# Patient Record
Sex: MALE | Race: WHITE | NOT HISPANIC OR LATINO | Employment: OTHER | URBAN - METROPOLITAN AREA
[De-identification: names, ages, dates, MRNs, and addresses within clinical notes are randomized per-mention and may not be internally consistent; named-entity substitution may affect disease eponyms.]

---

## 2017-01-16 ENCOUNTER — ALLSCRIPTS OFFICE VISIT (OUTPATIENT)
Dept: OTHER | Facility: OTHER | Age: 67
End: 2017-01-16

## 2017-01-16 DIAGNOSIS — E55.9 VITAMIN D DEFICIENCY: ICD-10-CM

## 2017-01-16 DIAGNOSIS — Z86.2 PERSONAL HISTORY OF DISEASES OF THE BLOOD AND BLOOD-FORMING ORGANS AND CERTAIN DISORDERS INVOLVING THE IMMUNE MECHANISM: ICD-10-CM

## 2017-01-16 DIAGNOSIS — E78.1 PURE HYPERGLYCERIDEMIA: ICD-10-CM

## 2017-01-16 DIAGNOSIS — I10 ESSENTIAL (PRIMARY) HYPERTENSION: ICD-10-CM

## 2017-06-12 ENCOUNTER — LAB CONVERSION - ENCOUNTER (OUTPATIENT)
Dept: OTHER | Facility: OTHER | Age: 67
End: 2017-06-12

## 2017-06-12 LAB
25(OH)D3 SERPL-MCNC: 38 NG/ML (ref 30–100)
BUN SERPL-MCNC: 26 MG/DL (ref 7–25)
BUN/CREA RATIO (HISTORICAL): 27 (CALC) (ref 6–22)
CALCIUM SERPL-MCNC: 9.6 MG/DL (ref 8.6–10.3)
CHLORIDE SERPL-SCNC: 104 MMOL/L (ref 98–110)
CHOLEST SERPL-MCNC: 186 MG/DL (ref 125–200)
CHOLEST/HDLC SERPL: 4.7 (CALC)
CO2 SERPL-SCNC: 30 MMOL/L (ref 20–31)
CREAT SERPL-MCNC: 0.95 MG/DL (ref 0.7–1.25)
DEPRECATED RDW RBC AUTO: 12.8 % (ref 11–15)
EGFR AFRICAN AMERICAN (HISTORICAL): 94 ML/MIN/1.73M2
EGFR-AMERICAN CALC (HISTORICAL): 81 ML/MIN/1.73M2
GLUCOSE (HISTORICAL): 116 MG/DL (ref 65–99)
HCT VFR BLD AUTO: 50.8 % (ref 38.5–50)
HDLC SERPL-MCNC: 40 MG/DL
HGB BLD-MCNC: 17.1 G/DL (ref 13.2–17.1)
LDL CHOLESTEROL (HISTORICAL): 74 MG/DL (CALC)
MCH RBC QN AUTO: 32.7 PG (ref 27–33)
MCHC RBC AUTO-ENTMCNC: 33.6 G/DL (ref 32–36)
MCV RBC AUTO: 97.2 FL (ref 80–100)
NON-HDL-CHOL (CHOL-HDL) (HISTORICAL): 146 MG/DL (CALC)
PLATELET # BLD AUTO: 79 THOUSAND/UL (ref 140–400)
PMV BLD AUTO: 9.9 FL (ref 7.5–12.5)
POTASSIUM SERPL-SCNC: 3.6 MMOL/L (ref 3.5–5.3)
RBC # BLD AUTO: 5.23 MILLION/UL (ref 4.2–5.8)
SODIUM SERPL-SCNC: 141 MMOL/L (ref 135–146)
TRIGL SERPL-MCNC: 360 MG/DL
WBC # BLD AUTO: 5.1 THOUSAND/UL (ref 3.8–10.8)

## 2017-06-16 ENCOUNTER — ALLSCRIPTS OFFICE VISIT (OUTPATIENT)
Dept: OTHER | Facility: OTHER | Age: 67
End: 2017-06-16

## 2017-08-09 ENCOUNTER — GENERIC CONVERSION - ENCOUNTER (OUTPATIENT)
Dept: OTHER | Facility: OTHER | Age: 67
End: 2017-08-09

## 2017-11-08 ENCOUNTER — LAB CONVERSION - ENCOUNTER (OUTPATIENT)
Dept: OTHER | Facility: OTHER | Age: 67
End: 2017-11-08

## 2017-11-08 LAB
HBA1C MFR BLD HPLC: 4.8 % OF TOTAL HGB
PROSTATE SPECIFIC ANTIGEN TOTAL (HISTORICAL): 0.9 NG/ML

## 2017-11-17 ENCOUNTER — ALLSCRIPTS OFFICE VISIT (OUTPATIENT)
Dept: OTHER | Facility: OTHER | Age: 67
End: 2017-11-17

## 2017-11-21 NOTE — PROGRESS NOTES
Assessment    1  Benign essential hypertension (401 1) (I10)   2  Anxiety (300 00) (F41 9)   3  Unequal leg length (acquired) (736 81) (M21 70)   4  GERD without esophagitis (530 81) (K21 9)    Plan  Benign essential hypertension    · Atenolol 100 MG Oral Tablet   · Atenolol 50 MG Oral Tablet   · Atenolol 25 MG Oral Tablet; TAKE 4 TABLET Twice daily   · Valsartan-Hydrochlorothiazide 320-25 MG Oral Tablet; TAKE 1 TABLET DAILY  Depression    · ALPRAZolam 0 25 MG Oral Tablet; TAKE 1 TABLET 4 TIMES DAILY AS NEEDED  Depression, Insomnia    · LORazepam 1 MG Oral Tablet; TAKE 1 TABLET BY MOUTH 3 TIMES DAILY  GERD without esophagitis    · RaNITidine HCl - 300 MG Oral Tablet (Zantac); TAKE 1 TABLET DAILY ASDIRECTED   · Prostate Exam; Status:Complete;   Done: 40ITH2362   · Follow-up visit in 6 months Evaluation and Treatment  Follow-up  Status: Hold For -Scheduling  Requested for: 25MSF8107  Insomnia    · Escitalopram Oxalate 10 MG Oral Tablet; TAKE 1 TABLET DAILY    Discussion/Summary    Reviewed labs  Continue with foot lifts  The patient was counseled regarding instructions for management,-- risk factor reductions,-- importance of compliance with treatment  Educational resources provided: [employer]  Possible side effects of new medications were reviewed with the patient/guardian today  The treatment plan was reviewed with the patient/guardian  The patient/guardian understands and agrees with the treatment plan     Self Referrals: No      Chief Complaint  pt here today for 5 month check up and to review bw   Patient is here today for follow up of chronic conditions described in HPI  History of Present Illness  Follow up  No complaints  Has been on Xanax and Ativan combo for 40 years  Review of Systems   Constitutional: No fever or chills, feels well, no tiredness, no recent weight gain or weight loss  Eyes: No complaints of eye pain, no red eyes, no discharge from eyes, no itchy eyes    ENT: no complaints of earache, no hearing loss, no nosebleeds, no nasal discharge, no sore throat, no hoarseness  Cardiovascular: No complaints of slow heart rate, no fast heart rate, no chest pain, no palpitations, no leg claudication, no lower extremity  Respiratory: No complaints of shortness of breath, no wheezing, no cough, no SOB on exertion, no orthopnea or PND  Gastrointestinal: No complaints of abdominal pain, no constipation, no nausea or vomiting, no diarrhea or bloody stools  Genitourinary: No complaints of dysuria, no incontinence, no hesitancy, no nocturia, no genital lesion, no testicular pain  Musculoskeletal: No complaints of arthralgia, no myalgias, no joint swelling or stiffness, no limb pain or swelling  Integumentary: No complaints of skin rash or skin lesions, no itching, no skin wound, no dry skin  Neurological: No compliants of headache, no confusion, no convulsions, no numbness or tingling, no dizziness or fainting, no limb weakness, no difficulty walking  Psychiatric: Is not suicidal, no sleep disturbances, no anxiety or depression, no change in personality, no emotional problems  Endocrine: No complaints of proptosis, no hot flashes, no muscle weakness, no erectile dysfunction, no deepening of the voice, no feelings of weakness  Hematologic/Lymphatic: No complaints of swollen glands, no swollen glands in the neck, does not bleed easily, no easy bruising  Active Problems    1  Advance directive in chart (V49 89) (Z78 9)   2  Anxiety (300 00) (F41 9)   3  Benign essential hypertension (401 1) (I10)   4  Depression (311) (F32 9)   5  Depression screening (V79 0) (Z13 89)   6  Encounter for screening colonoscopy (V76 51) (Z12 11)   7  Essential hypertriglyceridemia (272 1) (E78 1)   8  GERD without esophagitis (530 81) (K21 9)   9  Glaucoma screening (V80 1) (Z13 5)   10  Hypercalcemia (275 42) (E83 52)   11  Hyperglycemia (790 29) (R73 9)   12  Hyperparathyroidism (252 00) (E21 3)   13   Ingrowing nail (703 0) (L60 0)   14  Insomnia (780 52) (G47 00)   15  Medicare annual wellness visit, initial (V70 0) (Z00 00)   16  Need for influenza vaccination (V04 81) (Z23)   17  Rosacea (695 3) (L71 9)   18  Screening for genitourinary condition (V81 6) (Z13 89)   19  Screening for neurological condition (V80 09) (Z13 89)   20  Screening PSA (prostate specific antigen) (V76 44) (Z12 5)   21  Tinea pedis (110 4) (B35 3)   22  Unequal leg length (acquired) (736 81) (M21 70)   23  Urinary incontinence (788 30) (R32)   24  Vitamin D deficiency (268 9) (E55 9)    Past Medical History  1  History of Cat-scratch disease (078 3) (A28 1)   2  History of Cellulitis (682 9) (L03 90)   3  History of erysipelas (V13 3) (Z87 2)   4  History of low back pain (V13 59) (Z87 39)   5  History of rosacea (V13 3) (Z87 2)   6  History of thrombocytopenia (V12 3) (Z86 2)   7  History of Joint pain, knee (719 46) (M25 569)   8  History of Parathyroid adenoma (227 1) (D35 1)   9  History of Tick Bites (919 4)    Surgical History  1  History of Appendectomy   2  History of Lymphadenectomy   3  History of Parathyroid Surgery    Family History  Mother    1  Family history of aortic aneurysm (V17 49) (Z82 49)   2  Family history of Hypertension (V17 49)  Father    3  Family history of Acute Myocardial Infarction (V17 3)  Paternal Grandmother    4  Family history of Breast Cancer (V16 3)  Paternal Aunt    11  Family history of Breast Cancer (V16 3)  Paternal Uncle    10  Family history of Diabetes Mellitus (V18 0)    Social History     · Advance directive in chart (V49 89) (Z78 9)   · Denied: History of Alcohol Use (History)   · Former smoker (V15 82) (V23 995)    Current Meds   1  ALPRAZolam 0 25 MG Oral Tablet; TAKE 1 TABLET 4 TIMES DAILY AS NEEDED; Therapy: 61BCM1253 to (Evaluate:26Wdk1361)  Requested for: 74OAO0112; Last Rx:16Jun2017 Ordered   2  Aspirin 81 MG TABS; TAKE 1 TABLET DAILY; Therapy: 22Jul2013 to Recorded   3   Atenolol 100 MG Oral Tablet; take 1 tablet by mouth twice a day; Therapy: 66END2397 to (Evaluate:54Sny9083)  Requested for: 26BRW3053; Last Rx:16Jun2017 Ordered   4  Atenolol 25 MG Oral Tablet; TAKE 4 TABLET Twice daily; Therapy: 93TBC4455 to (Evaluate:62Cjg3735)  Requested for: 75XHV3245; Last Rx:09Nov2017 Ordered   5  Atenolol 50 MG Oral Tablet; TAKE 4 TABLET Daily; Therapy: 10Yoi6367 to (Evaluate:62Eeh9079)  Requested for: 03MVM7309; Last Rx:09Nov2017 Ordered   6  Calcium Citrate Chewy Bite 500-500 MG-UNIT Oral Tablet Chewable; Therapy: 61UJK8683 to Recorded   7  Doxycycline Hyclate 150 MG Oral Tablet Delayed Release; Therapy: 66Age9299 to Recorded   8  Econazole Nitrate 1 % External Cream; APPLY THIN FILM ONCE DAILY AS DIRECTED; Therapy: 92Rvn4035 to Recorded   9  Escitalopram Oxalate 10 MG Oral Tablet; TAKE 1 TABLET DAILY; Therapy: 34TQQ9863 to (Evaluate:23Wbs1157)  Requested for: 72NPS4771; Last Rx:16Jun2017 Ordered   10  Hydrocortisone 2 5 % External Cream; APPLY SPARINGLY TO AFFECTED AREA(S) 2 TO  3 TIMES DAILY; Therapy: 53Rqw7682 to Recorded   11  Klor-Con 10 10 MEQ Oral Tablet Extended Release; TAKE 1 TABLET DAILY; Therapy: 04THR8243 to (Stormy Richardson)  Requested for: 20DUW6308; Last  Rx:19Ghe6320 Ordered   12  LORazepam 1 MG Oral Tablet; TAKE 1 TABLET BY MOUTH 3 TIMES DAILY; Therapy: 49LER3848 to (Evaluate:39Png7207)  Requested for: 21JKA0794; Last  Rx:16Jun2017 Ordered   13  Magnesium 250 MG Oral Tablet; Therapy: 95AFQ9728 to Recorded   14  Metrogel 1 % External Gel; APPLY  AND RUB  IN A THIN FILM TO AFFECTED AREAS 1-2  TIMES DAILY AS NEEDED  (AM AND PM); Therapy: 63Zay7121 to Recorded   15  Multivitamins TABS; 1 TAB DAILY; Therapy: 63VJA9287 to (Last GA:10EKP2214)  Requested for: 72STS7706 Ordered   16  Probiotic Oral Capsule; Therapy: 46Yjk7407 to Recorded   17  RaNITidine HCl - 300 MG Oral Tablet; TAKE 1 TABLET DAILY AS DIRECTED;   Therapy: 40CHP7326 to (Evaluate:58Jkx0441)  Requested for: 63CIL4587; Last Rx:46Tcb0065 Ordered   18  Valsartan-Hydrochlorothiazide 320-25 MG Oral Tablet; TAKE 1 TABLET DAILY; Therapy: 28EBY8615 to (Evaluate:81Bet0563)  Requested for: 55BTC9621; Last  Rx:37Ggp5032 Ordered   19  Vitamin C Oral Tablet Chewable; 1 TAB DAILY; Therapy: 30BTC1950 to (Last VU:61UFO3679)  Requested for: 90HKD5953 Ordered   20  Vitamin D3 1000 UNIT Oral Tablet; Therapy: 29EAD3280 to Recorded    The medication list was reviewed and updated today  Allergies  1  Morphine Derivatives  2  No Known Environmental Allergies   3  No Known Food Allergies    Vitals  Vital Signs    Recorded: 81BSQ5035 12:03PM   Temperature 98 7 F, Oral   Heart Rate 87   Respiration 16   Systolic 120, LUE   Diastolic 76, LUE   Height 5 ft 5 in   Weight 226 lb    BMI Calculated 37 61   BSA Calculated 2 08   O2 Saturation 97       Physical Exam   Constitutional  General appearance: No acute distress, well appearing and well nourished  Eyes  Conjunctiva and lids: No swelling, erythema, or discharge  Pupils and irises: Equal, round and reactive to light  Ears, Nose, Mouth, and Throat  External inspection of ears and nose: Normal    Otoscopic examination: Tympanic membrance translucent with normal light reflex  Canals patent without erythema  Nasal mucosa, septum, and turbinates: Normal without edema or erythema  Oropharynx: Normal with no erythema, edema, exudate or lesions  Pulmonary  Respiratory effort: No increased work of breathing or signs of respiratory distress  Auscultation of lungs: Clear to auscultation, equal breath sounds bilaterally, no wheezes, no rales, no rhonci  Cardiovascular  Auscultation of heart: Normal rate and rhythm, normal S1 and S2, without murmurs  Examination of extremities for edema and/or varicosities: Normal    Abdomen  Abdomen: Non-tender, no masses  Liver and spleen: No hepatomegaly or splenomegaly  Lymphatic  Palpation of lymph nodes in neck: No lymphadenopathy     Musculoskeletal  Gait and station: Normal    Digits and nails: Normal without clubbing or cyanosis  Inspection/palpation of joints, bones, and muscles: Normal    Skin  Skin and subcutaneous tissue: Normal without rashes or lesions  Neurologic  Sensation: No sensory loss  Psychiatric  Orientation to person, place and time: Normal    Mood and affect: Normal          Results/Data  (Q) HEMOGLOBIN A1c 91JXZ4634 01:12PM Vicki Knox     REPORT COMMENT: REQ ON HOLD FASTING:NO AN UPDATE OR CORRECTION HAS BEEN MADE TO NAME     Test Name Result Flag Reference   HEMOGLOBIN A1c 4 8 % of total Hgb  <5 7     For the purpose of screening for the presence of diabetes:   <5 7%       Consistent with the absence of diabetes 5 7-6 4%    Consistent with increased risk for diabetes             (prediabetes) > or =6 5%  Consistent with diabetes   This assay result is consistent with a decreased risk of diabetes  Currently, no consensus exists regarding use of hemoglobin A1c for diagnosis of diabetes in children  According to American Diabetes Association (ADA) guidelines, hemoglobin A1c <7 0% represents optimal control in non-pregnant diabetic patients  Different metrics may apply to specific patient populations  Standards of Medical Care in Diabetes(ADA)  Health Management  Benign essential hypertension   (1) COMPREHENSIVE METABOLIC PANEL; every 1 year; Last 98IXD7771; Next Due: 64Srq4814; Overdue  (1) LIPID PANEL, FASTING; every 1 year; Last 97AVE0980; Next Due: 06Bdi5793; Active  EKG/ECG- POC; every 1 year; Last 41JFH3924; Next Due: 40ICO1945; Overdue  Depression screening   *VB - Fall Risk Assessment  (Dx Z13 89 Screen for Neurologic Disorder); every 1 year; Pora59Upo8519; Next Due: 57GFC3976; Overdue  *VB - Urinary Incontinence Screen (Dx Z13 89 Screen for UI); every 1 year; Last 93QKP3351; NextDue: 24CRI8777; Overdue  *VB-Depression Screening; every 1 year; Last 73QMC2666;  Next Permanently Deferred;   Encounter for screening colonoscopy COLONOSCOPY; every 10 years; Last 19NSL1170; Next Due: 92FHM5961; Active  Glaucoma screening   *VB - Glaucoma Screen; every 1 year; Last 52XIG6946; Next Due: 27TTL6108; Overdue  Need for influenza vaccination   Influenza; every 1 year; Next Due: 37PFF3659; Overdue  Screening for genitourinary condition   *VB - Fall Risk Assessment  (Dx Z13 89 Screen for Neurologic Disorder); every 1 year; Ions97Eik1080; Next Due: 09MVB3912; Overdue  *VB - Urinary Incontinence Screen (Dx Z13 89 Screen for UI); every 1 year; Last 36WMD2398; NextDue: 38UNN8720; Overdue  *VB-Depression Screening; every 1 year; Last 04HHW6102; Next Permanently Deferred;   Screening for neurological condition   *VB - Fall Risk Assessment  (Dx Z13 89 Screen for Neurologic Disorder); every 1 year; Xtdv46Qem2477; Next Due: 29FSM7202; Overdue  *VB - Urinary Incontinence Screen (Dx Z13 89 Screen for UI); every 1 year; Last 69LNQ2301; NextDue: 34NEU2590; Overdue  *VB-Depression Screening; every 1 year; Last 24BKX3014; Next Permanently Deferred;   Screening PSA (prostate specific antigen)   (1) PSA (SCREEN) (Dx V76 44 Screen for Prostate Cancer); every 1 year; Last 47ZQZ4926; NextDue: 49FSV8286; Active  Health Maintenance   Medicare Annual Wellness Visit; every 1 year; Last 93Mxv7719; Next Due: 60AHJ9486;  Overdue    Future Appointments    Date/Time Provider Specialty Site   05/21/2018 12:00 PM Lenin Sanchez DO [facility]       Signatures   Electronically signed by : Genesis Spicer DO; Nov 20 2017  8:09AM EST                       (Author)

## 2018-01-09 NOTE — MISCELLANEOUS
Message  Pharmacist left a msg stating Atenolol 100 mg is out of stock/on backorder, since pt takes 100 mg 2 times a day they asked if Dr Klaus Solomon can authorize Atenolol 50 mg 2 tablets 2 times a day  Per Dr lKaus Solomon that is fine  Pharmacist received and understood instructions given  BF/VFP      Active Problems    1  Advance directive in chart (V49 89) (Z78 9)   2  Anxiety (300 00) (F41 9)   3  Benign essential hypertension (401 1) (I10)   4  Depression (311) (F32 9)   5  Depression screening (V79 0) (Z13 89)   6  Encounter for screening colonoscopy (V76 51) (Z12 11)   7  Essential hypertriglyceridemia (272 1) (E78 1)   8  GERD without esophagitis (530 81) (K21 9)   9  Glaucoma screening (V80 1) (Z13 5)   10  Hypercalcemia (275 42) (E83 52)   11  Hyperglycemia (790 29) (R73 9)   12  Hyperparathyroidism (252 00) (E21 3)   13  Ingrowing nail (703 0) (L60 0)   14  Insomnia (780 52) (G47 00)   15  Medicare annual wellness visit, initial (V70 0) (Z00 00)   16  Need for influenza vaccination (V04 81) (Z23)   17  Rosacea (695 3) (L71 9)   18  Screening for genitourinary condition (V81 6) (Z13 89)   19  Screening for neurological condition (V80 09) (Z13 89)   20  Screening PSA (prostate specific antigen) (V76 44) (Z12 5)   21  Tinea pedis (110 4) (B35 3)   22  Unequal leg length (acquired) (736 81) (M21 70)   23  Urinary incontinence (788 30) (R32)   24  Vitamin D deficiency (268 9) (E55 9)    Current Meds   1  ALPRAZolam 0 25 MG Oral Tablet; TAKE 1 TABLET 4 TIMES DAILY AS NEEDED; Therapy: 73PLM5433 to (Evaluate:39Hre8199)  Requested for: 76AEP8119; Last   Rx:16Jun2017 Ordered   2  Aspirin 81 MG TABS; TAKE 1 TABLET DAILY; Therapy: 46Bvm9030 to Recorded   3  Atenolol 100 MG Oral Tablet; take 1 tablet by mouth twice a day; Therapy: 27BPA3757 to (Evaluate:46Cti7073)  Requested for: 19GOD3707; Last   Rx:16Jun2017 Ordered   4  Calcium Citrate Chewy Bite 500-500 MG-UNIT Oral Tablet Chewable;    Therapy: 19ORF4816 to Recorded   5  Doxycycline Hyclate 150 MG Oral Tablet Delayed Release; Therapy: 86Ngq1074 to Recorded   6  Econazole Nitrate 1 % External Cream; APPLY THIN FILM ONCE DAILY AS DIRECTED; Therapy: 51Edm3762 to Recorded   7  Escitalopram Oxalate 10 MG Oral Tablet; TAKE 1 TABLET DAILY; Therapy: 07TUV7514 to (Evaluate:65Jgi8500)  Requested for: 61MSC3230; Last   Rx:16Jun2017 Ordered   8  Hydrocortisone 2 5 % External Cream; APPLY SPARINGLY TO AFFECTED AREA(S) 2   TO 3 TIMES DAILY; Therapy: 94Jbu4779 to Recorded   9  Klor-Con 10 10 MEQ Oral Tablet Extended Release; TAKE 1 TABLET DAILY; Therapy: 89SKY6795 to (Evaluate:93Dzn3829)  Requested for: 29Pds7847; Last   Rx:16Jan2017; Status: ACTIVE - Renewal Denied Ordered   10  LORazepam 1 MG Oral Tablet; TAKE 1 TABLET BY MOUTH 3 TIMES DAILY; Therapy: 68QST4649 to (Evaluate:44Lso9133)  Requested for: 41JOY4879; Last    Rx:16Jun2017 Ordered   11  Magnesium 250 MG Oral Tablet; Therapy: 57NMC0273 to Recorded   12  Metrogel 1 % External Gel; APPLY  AND RUB  IN A THIN FILM TO AFFECTED AREAS    1-2 TIMES DAILY AS NEEDED  (AM AND PM); Therapy: 96Iej6814 to Recorded   13  Multivitamins TABS; 1 TAB DAILY; Therapy: 86KFL9379 to (Last NW:77FPN1366)  Requested for: 69LIP4259 Ordered   14  Probiotic Oral Capsule; Therapy: 26Ecn3460 to Recorded   15  RaNITidine HCl - 300 MG Oral Tablet; TAKE 1 TABLET DAILY AS DIRECTED; Therapy: 56WKB0293 to (Evaluate:90Jgs8382)  Requested for: 23LLA4062; Last    Rx:16Jun2017 Ordered   16  Valsartan-Hydrochlorothiazide 320-25 MG Oral Tablet; TAKE 1 TABLET DAILY; Therapy: 00IQK6582 to (Evaluate:93Bze7676)  Requested for: 05TMQ6663; Last    Rx:16Jun2017 Ordered   17  Vitamin C Oral Tablet Chewable; 1 TAB DAILY; Therapy: 84OWQ3813 to (Last EY:52MKF4591)  Requested for: 76CKJ7751 Ordered   18  Vitamin D3 1000 UNIT Oral Tablet; Therapy: 05SUF7889 to Recorded    Allergies    1  Morphine Derivatives    2   No Known Environmental Allergies   3   No Known Food Allergies    Signatures   Electronically signed by : Joseph Rollins DO; Aug  9 2017  5:43PM EST                       (Author)

## 2018-01-13 VITALS
HEIGHT: 65 IN | HEART RATE: 87 BPM | BODY MASS INDEX: 37.65 KG/M2 | DIASTOLIC BLOOD PRESSURE: 76 MMHG | TEMPERATURE: 98.7 F | WEIGHT: 226 LBS | OXYGEN SATURATION: 97 % | SYSTOLIC BLOOD PRESSURE: 130 MMHG | RESPIRATION RATE: 16 BRPM

## 2018-01-13 VITALS
RESPIRATION RATE: 16 BRPM | HEIGHT: 65 IN | TEMPERATURE: 97.4 F | OXYGEN SATURATION: 97 % | BODY MASS INDEX: 36.82 KG/M2 | HEART RATE: 72 BPM | SYSTOLIC BLOOD PRESSURE: 124 MMHG | WEIGHT: 221 LBS | DIASTOLIC BLOOD PRESSURE: 82 MMHG

## 2018-01-14 VITALS
HEART RATE: 87 BPM | BODY MASS INDEX: 39.15 KG/M2 | DIASTOLIC BLOOD PRESSURE: 86 MMHG | SYSTOLIC BLOOD PRESSURE: 134 MMHG | WEIGHT: 235 LBS | HEIGHT: 65 IN | TEMPERATURE: 97.9 F

## 2018-01-31 RX ORDER — VALSARTAN AND HYDROCHLOROTHIAZIDE 320; 25 MG/1; MG/1
TABLET, FILM COATED ORAL
Qty: 30 TABLET | Refills: 5 | OUTPATIENT
Start: 2018-01-31

## 2018-02-28 RX ORDER — POTASSIUM CHLORIDE 750 MG/1
TABLET, FILM COATED, EXTENDED RELEASE ORAL
Qty: 30 TABLET | Refills: 5 | OUTPATIENT
Start: 2018-02-28

## 2018-03-05 DIAGNOSIS — E83.52 HYPERCALCEMIA: Primary | ICD-10-CM

## 2018-03-05 RX ORDER — POTASSIUM CHLORIDE 750 MG/1
10 TABLET, FILM COATED, EXTENDED RELEASE ORAL DAILY
Qty: 30 TABLET | Refills: 5 | Status: SHIPPED | OUTPATIENT
Start: 2018-03-05 | End: 2018-05-21 | Stop reason: SDUPTHER

## 2018-03-05 RX ORDER — POTASSIUM CHLORIDE 750 MG/1
1 TABLET, FILM COATED, EXTENDED RELEASE ORAL DAILY
COMMUNITY
Start: 2012-04-23 | End: 2018-03-05 | Stop reason: SDUPTHER

## 2018-04-30 DIAGNOSIS — I10 ESSENTIAL HYPERTENSION: Primary | ICD-10-CM

## 2018-04-30 RX ORDER — ATENOLOL 25 MG/1
TABLET ORAL
Qty: 720 TABLET | Refills: 0 | OUTPATIENT
Start: 2018-04-30

## 2018-04-30 RX ORDER — ATENOLOL 100 MG/1
100 TABLET ORAL 2 TIMES DAILY
Qty: 180 TABLET | Refills: 1 | Status: SHIPPED | OUTPATIENT
Start: 2018-04-30 | End: 2018-05-21 | Stop reason: SDUPTHER

## 2018-04-30 RX ORDER — ATENOLOL 25 MG/1
4 TABLET ORAL 2 TIMES DAILY
COMMUNITY
Start: 2017-11-09 | End: 2018-04-30 | Stop reason: SDUPTHER

## 2018-05-21 ENCOUNTER — OFFICE VISIT (OUTPATIENT)
Dept: FAMILY MEDICINE CLINIC | Facility: CLINIC | Age: 68
End: 2018-05-21
Payer: COMMERCIAL

## 2018-05-21 VITALS
SYSTOLIC BLOOD PRESSURE: 136 MMHG | HEIGHT: 65 IN | OXYGEN SATURATION: 96 % | WEIGHT: 233.6 LBS | TEMPERATURE: 98.5 F | BODY MASS INDEX: 38.92 KG/M2 | DIASTOLIC BLOOD PRESSURE: 72 MMHG | HEART RATE: 84 BPM

## 2018-05-21 DIAGNOSIS — F32.0 CURRENT MILD EPISODE OF MAJOR DEPRESSIVE DISORDER WITHOUT PRIOR EPISODE (HCC): Primary | ICD-10-CM

## 2018-05-21 DIAGNOSIS — I10 ESSENTIAL HYPERTENSION: ICD-10-CM

## 2018-05-21 DIAGNOSIS — E78.00 HYPERCHOLESTEREMIA: ICD-10-CM

## 2018-05-21 DIAGNOSIS — Z12.5 PROSTATE CANCER SCREENING: ICD-10-CM

## 2018-05-21 DIAGNOSIS — R12 HEART BURN: ICD-10-CM

## 2018-05-21 DIAGNOSIS — E83.52 HYPERCALCEMIA: ICD-10-CM

## 2018-05-21 DIAGNOSIS — E55.9 VITAMIN D DEFICIENCY: ICD-10-CM

## 2018-05-21 DIAGNOSIS — F41.9 ANXIETY: ICD-10-CM

## 2018-05-21 PROCEDURE — 99215 OFFICE O/P EST HI 40 MIN: CPT | Performed by: FAMILY MEDICINE

## 2018-05-21 RX ORDER — ALPRAZOLAM 0.25 MG/1
0.25 TABLET ORAL 4 TIMES DAILY
Refills: 5 | COMMUNITY
Start: 2018-05-05 | End: 2018-05-21 | Stop reason: SDUPTHER

## 2018-05-21 RX ORDER — ESCITALOPRAM OXALATE 10 MG/1
10 TABLET ORAL DAILY
Refills: 5 | COMMUNITY
Start: 2018-04-29 | End: 2018-05-21 | Stop reason: SDUPTHER

## 2018-05-21 RX ORDER — VALSARTAN AND HYDROCHLOROTHIAZIDE 320; 25 MG/1; MG/1
1 TABLET, FILM COATED ORAL DAILY
Qty: 90 TABLET | Refills: 1 | Status: SHIPPED | OUTPATIENT
Start: 2018-05-21 | End: 2018-11-28 | Stop reason: RX

## 2018-05-21 RX ORDER — ESCITALOPRAM OXALATE 10 MG/1
10 TABLET ORAL DAILY
Qty: 90 TABLET | Refills: 1 | Status: SHIPPED | OUTPATIENT
Start: 2018-05-21 | End: 2018-07-26 | Stop reason: SDUPTHER

## 2018-05-21 RX ORDER — POTASSIUM CHLORIDE 750 MG/1
10 TABLET, FILM COATED, EXTENDED RELEASE ORAL DAILY
Qty: 30 TABLET | Refills: 0 | Status: SHIPPED | OUTPATIENT
Start: 2018-05-21 | End: 2018-09-04 | Stop reason: SDUPTHER

## 2018-05-21 RX ORDER — ALPRAZOLAM 0.25 MG/1
0.25 TABLET ORAL 4 TIMES DAILY
Qty: 120 TABLET | Refills: 5 | Status: SHIPPED | OUTPATIENT
Start: 2018-05-21 | End: 2018-11-28 | Stop reason: SDUPTHER

## 2018-05-21 RX ORDER — MULTIVITAMIN WITH IRON
TABLET ORAL
COMMUNITY
Start: 2017-06-16

## 2018-05-21 RX ORDER — LORAZEPAM 1 MG/1
1 TABLET ORAL 3 TIMES DAILY
Refills: 5 | COMMUNITY
Start: 2018-05-05 | End: 2018-05-21 | Stop reason: SDUPTHER

## 2018-05-21 RX ORDER — METRONIDAZOLE 10 MG/G
GEL TOPICAL
COMMUNITY
Start: 2013-07-22

## 2018-05-21 RX ORDER — VALSARTAN AND HYDROCHLOROTHIAZIDE 320; 25 MG/1; MG/1
1 TABLET, FILM COATED ORAL DAILY
Refills: 5 | COMMUNITY
Start: 2018-05-04 | End: 2018-05-21 | Stop reason: SDUPTHER

## 2018-05-21 RX ORDER — BIOTIN 1 MG
TABLET ORAL
COMMUNITY
Start: 2017-06-16

## 2018-05-21 RX ORDER — RANITIDINE 300 MG/1
300 TABLET ORAL DAILY
Refills: 5 | COMMUNITY
Start: 2018-05-04 | End: 2018-05-21 | Stop reason: SDUPTHER

## 2018-05-21 RX ORDER — RANITIDINE 300 MG/1
300 TABLET ORAL DAILY
Qty: 90 TABLET | Refills: 1 | Status: SHIPPED | OUTPATIENT
Start: 2018-05-21 | End: 2018-11-27 | Stop reason: RX

## 2018-05-21 RX ORDER — LORAZEPAM 1 MG/1
1 TABLET ORAL 3 TIMES DAILY
Qty: 90 TABLET | Refills: 5 | Status: SHIPPED | OUTPATIENT
Start: 2018-05-21 | End: 2018-11-28 | Stop reason: SDUPTHER

## 2018-05-21 RX ORDER — DOXYCYCLINE HYCLATE 150 MG/1
150 TABLET, DELAYED RELEASE ORAL DAILY
Refills: 6 | COMMUNITY
Start: 2018-05-04

## 2018-05-21 RX ORDER — ATENOLOL 100 MG/1
100 TABLET ORAL 2 TIMES DAILY
Qty: 180 TABLET | Refills: 1 | Status: SHIPPED | OUTPATIENT
Start: 2018-05-21 | End: 2018-12-05 | Stop reason: SDUPTHER

## 2018-05-21 RX ORDER — ASCORBIC ACID 100 MG
1 TABLET,CHEWABLE ORAL DAILY
COMMUNITY
Start: 2011-05-09

## 2018-05-21 NOTE — PROGRESS NOTES
Assessment/Plan: patient here today for follow up for hypertension   Pt stated that he would like to talk about vapors     No problem-specific Assessment & Plan notes found for this encounter  1  Current mild episode of major depressive disorder without prior episode (HCC)  escitalopram (LEXAPRO) 10 mg tablet   2  Hypercalcemia  potassium chloride (KLOR-CON 10) 10 mEq tablet   3  Essential hypertension  valsartan-hydrochlorothiazide (DIOVAN-HCT) 320-25 MG per tablet    atenolol (TENORMIN) 100 mg tablet    Basic metabolic panel    CBC and Platelet    Hepatic function panel   4  Anxiety  LORazepam (ATIVAN) 1 mg tablet    ALPRAZolam (XANAX) 0 25 mg tablet   5  Heart burn  ranitidine (ZANTAC) 300 MG tablet   6  Hypercholesteremia  Lipid panel   7  Vitamin D deficiency  Vitamin D 25 hydroxy   8  Prostate cancer screening  PSA Total, Diagnostic          There are no diagnoses linked to this encounter  Subjective:      Patient ID: Mae Ayon  is a 77 y o  male  Follow up  Anxiety/depression controlled  Continues with B  P and cholesterol Meds without difficulty  Is not overdoing the Vitamin D supplement  Reflux controlled  Lacking physical activity  The following portions of the patient's history were reviewed and updated as appropriate: allergies, current medications, past family history, past medical history, past social history, past surgical history and problem list     Review of Systems   Constitutional: Negative  HENT: Negative  Respiratory: Negative  Cardiovascular: Negative  Gastrointestinal: Negative  Genitourinary: Negative  Musculoskeletal: Negative  Skin: Negative  Neurological: Negative  Psychiatric/Behavioral: Negative            Objective:      /72 (BP Location: Left arm, Patient Position: Sitting, Cuff Size: Large)   Pulse 84   Temp 98 5 °F (36 9 °C) (Oral)   Ht 5' 5" (1 651 m)   Wt 106 kg (233 lb 9 6 oz)   SpO2 96%   BMI 38 87 kg/m² Physical Exam   Constitutional: He is oriented to person, place, and time  He appears well-developed  HENT:   Head: Normocephalic and atraumatic  Nose: Nose normal    Mouth/Throat: Oropharynx is clear and moist    Cardiovascular: Normal rate, regular rhythm, normal heart sounds and intact distal pulses  Pulmonary/Chest: Effort normal and breath sounds normal    Abdominal: Soft  Bowel sounds are normal    Neurological: He is alert and oriented to person, place, and time  Skin: Skin is warm and dry  Psychiatric: He has a normal mood and affect   His behavior is normal  Judgment and thought content normal

## 2018-05-21 NOTE — PATIENT INSTRUCTIONS
Refill Meds, Labs and follow up  Reviewed prior labs  Discussed needed physical activity and benefits of the physical / mental rewards  Weight up 12 pounds past year, would like to see some weight reduction  Nutrition, increase the protein, discussed types  40 minutes spent with patient, > 50 % of time spent counseling and coordination of care

## 2018-06-02 LAB
25(OH)D3 SERPL-MCNC: 40 NG/ML (ref 30–100)
ALBUMIN SERPL-MCNC: 4.5 G/DL (ref 3.6–5.1)
ALBUMIN/GLOB SERPL: 2 (CALC) (ref 1–2.5)
ALP SERPL-CCNC: 59 U/L (ref 40–115)
ALT SERPL-CCNC: 32 U/L (ref 9–46)
AST SERPL-CCNC: 27 U/L (ref 10–35)
BASOPHILS # BLD AUTO: 20 CELLS/UL (ref 0–200)
BASOPHILS NFR BLD AUTO: 0.4 %
BILIRUB DIRECT SERPL-MCNC: 0.2 MG/DL
BILIRUB INDIRECT SERPL-MCNC: 1.1 MG/DL (CALC) (ref 0.2–1.2)
BILIRUB SERPL-MCNC: 1.3 MG/DL (ref 0.2–1.2)
BUN SERPL-MCNC: 25 MG/DL (ref 7–25)
BUN/CREAT SERPL: ABNORMAL (CALC) (ref 6–22)
CALCIUM SERPL-MCNC: 9.5 MG/DL (ref 8.6–10.3)
CHLORIDE SERPL-SCNC: 105 MMOL/L (ref 98–110)
CHOLEST SERPL-MCNC: 160 MG/DL
CHOLEST/HDLC SERPL: 4.6 (CALC)
CO2 SERPL-SCNC: 28 MMOL/L (ref 20–31)
CREAT SERPL-MCNC: 0.96 MG/DL (ref 0.7–1.18)
EOSINOPHIL # BLD AUTO: 152 CELLS/UL (ref 15–500)
EOSINOPHIL NFR BLD AUTO: 3.1 %
ERYTHROCYTE [DISTWIDTH] IN BLOOD BY AUTOMATED COUNT: 12.4 % (ref 11–15)
GLOBULIN SER CALC-MCNC: 2.2 G/DL (CALC) (ref 1.9–3.7)
GLUCOSE SERPL-MCNC: 108 MG/DL (ref 65–99)
HCT VFR BLD AUTO: 50.1 % (ref 38.5–50)
HDLC SERPL-MCNC: 35 MG/DL
HGB BLD-MCNC: 17.4 G/DL (ref 13.2–17.1)
LDLC SERPL CALC-MCNC: 82 MG/DL (CALC)
LYMPHOCYTES # BLD AUTO: 1044 CELLS/UL (ref 850–3900)
LYMPHOCYTES NFR BLD AUTO: 21.3 %
MCH RBC QN AUTO: 33 PG (ref 27–33)
MCHC RBC AUTO-ENTMCNC: 34.7 G/DL (ref 32–36)
MCV RBC AUTO: 95.1 FL (ref 80–100)
MONOCYTES # BLD AUTO: 529 CELLS/UL (ref 200–950)
MONOCYTES NFR BLD AUTO: 10.8 %
NEUTROPHILS # BLD AUTO: 3156 CELLS/UL (ref 1500–7800)
NEUTROPHILS NFR BLD AUTO: 64.4 %
NONHDLC SERPL-MCNC: 125 MG/DL (CALC)
PLATELET # BLD AUTO: 89 THOUSAND/UL (ref 140–400)
PMV BLD REES-ECKER: 11.6 FL (ref 7.5–12.5)
POTASSIUM SERPL-SCNC: 3.6 MMOL/L (ref 3.5–5.3)
PROT SERPL-MCNC: 6.7 G/DL (ref 6.1–8.1)
PSA SERPL-MCNC: 1.2 NG/ML
RBC # BLD AUTO: 5.27 MILLION/UL (ref 4.2–5.8)
SL AMB EGFR AFRICAN AMERICAN: 92 ML/MIN/1.73M2
SL AMB EGFR NON AFRICAN AMERICAN: 80 ML/MIN/1.73M2
SODIUM SERPL-SCNC: 142 MMOL/L (ref 135–146)
TRIGL SERPL-MCNC: 361 MG/DL
WBC # BLD AUTO: 4.9 THOUSAND/UL (ref 3.8–10.8)

## 2018-06-07 RX ORDER — POTASSIUM CHLORIDE 750 MG/1
CAPSULE, EXTENDED RELEASE ORAL DAILY
Refills: 5 | COMMUNITY
Start: 2018-05-04 | End: 2018-08-14

## 2018-06-07 RX ORDER — LORAZEPAM 1 MG/1
1 TABLET ORAL 3 TIMES DAILY
COMMUNITY
Start: 2011-05-09 | End: 2018-08-14

## 2018-06-11 ENCOUNTER — OFFICE VISIT (OUTPATIENT)
Dept: FAMILY MEDICINE CLINIC | Facility: CLINIC | Age: 68
End: 2018-06-11
Payer: COMMERCIAL

## 2018-06-11 VITALS
BODY MASS INDEX: 37.62 KG/M2 | WEIGHT: 225.8 LBS | HEART RATE: 81 BPM | TEMPERATURE: 98.5 F | OXYGEN SATURATION: 96 % | DIASTOLIC BLOOD PRESSURE: 80 MMHG | SYSTOLIC BLOOD PRESSURE: 110 MMHG | HEIGHT: 65 IN

## 2018-06-11 DIAGNOSIS — F51.01 PRIMARY INSOMNIA: ICD-10-CM

## 2018-06-11 DIAGNOSIS — F32.0 CURRENT MILD EPISODE OF MAJOR DEPRESSIVE DISORDER WITHOUT PRIOR EPISODE (HCC): ICD-10-CM

## 2018-06-11 DIAGNOSIS — F41.9 ANXIETY: ICD-10-CM

## 2018-06-11 DIAGNOSIS — E78.1 HYPERTRIGLYCERIDEMIA: ICD-10-CM

## 2018-06-11 DIAGNOSIS — I10 ESSENTIAL HYPERTENSION: Primary | ICD-10-CM

## 2018-06-11 PROBLEM — F33.9 RECURRENT MAJOR DEPRESSIVE DISORDER (HCC): Status: ACTIVE | Noted: 2018-06-11

## 2018-06-11 PROCEDURE — 99214 OFFICE O/P EST MOD 30 MIN: CPT | Performed by: FAMILY MEDICINE

## 2018-06-11 NOTE — PATIENT INSTRUCTIONS
Reviewed labs  Prostate exam OK  Discussed needed physical activity, encouraged weight reduction, discussed foods to avoid (processed)

## 2018-07-26 DIAGNOSIS — F32.0 CURRENT MILD EPISODE OF MAJOR DEPRESSIVE DISORDER WITHOUT PRIOR EPISODE (HCC): ICD-10-CM

## 2018-07-27 RX ORDER — ESCITALOPRAM OXALATE 10 MG/1
TABLET ORAL
Qty: 30 TABLET | Refills: 5 | Status: SHIPPED | OUTPATIENT
Start: 2018-07-27 | End: 2018-10-29 | Stop reason: SDUPTHER

## 2018-08-14 ENCOUNTER — OFFICE VISIT (OUTPATIENT)
Dept: FAMILY MEDICINE CLINIC | Facility: CLINIC | Age: 68
End: 2018-08-14
Payer: COMMERCIAL

## 2018-08-14 VITALS
OXYGEN SATURATION: 98 % | BODY MASS INDEX: 37.49 KG/M2 | HEART RATE: 84 BPM | TEMPERATURE: 97.8 F | WEIGHT: 225 LBS | SYSTOLIC BLOOD PRESSURE: 132 MMHG | DIASTOLIC BLOOD PRESSURE: 74 MMHG | HEIGHT: 65 IN

## 2018-08-14 DIAGNOSIS — F33.1 MODERATE EPISODE OF RECURRENT MAJOR DEPRESSIVE DISORDER (HCC): ICD-10-CM

## 2018-08-14 DIAGNOSIS — H25.9 AGE-RELATED CATARACT OF BOTH EYES, UNSPECIFIED AGE-RELATED CATARACT TYPE: ICD-10-CM

## 2018-08-14 DIAGNOSIS — I10 ESSENTIAL HYPERTENSION: ICD-10-CM

## 2018-08-14 DIAGNOSIS — F41.9 ANXIETY: Primary | ICD-10-CM

## 2018-08-14 DIAGNOSIS — Z01.818 PREOPERATIVE CLEARANCE: ICD-10-CM

## 2018-08-14 PROCEDURE — 99214 OFFICE O/P EST MOD 30 MIN: CPT | Performed by: FAMILY MEDICINE

## 2018-08-14 PROCEDURE — 90714 TD VACC NO PRESV 7 YRS+ IM: CPT

## 2018-08-14 PROCEDURE — 90471 IMMUNIZATION ADMIN: CPT

## 2018-08-14 PROCEDURE — 3008F BODY MASS INDEX DOCD: CPT | Performed by: FAMILY MEDICINE

## 2018-08-14 PROCEDURE — 3075F SYST BP GE 130 - 139MM HG: CPT | Performed by: FAMILY MEDICINE

## 2018-08-14 PROCEDURE — 1160F RVW MEDS BY RX/DR IN RCRD: CPT

## 2018-08-14 PROCEDURE — 3078F DIAST BP <80 MM HG: CPT | Performed by: FAMILY MEDICINE

## 2018-08-14 PROCEDURE — 1160F RVW MEDS BY RX/DR IN RCRD: CPT | Performed by: FAMILY MEDICINE

## 2018-08-14 NOTE — PROGRESS NOTES
Assessment/Plan: patient here today for surgical  clearance for cataract surgery right eye on 08/28/2018 and left eye on 09/11/2018    No problem-specific Assessment & Plan notes found for this encounter  Diagnoses and all orders for this visit:    Anxiety    Moderate episode of recurrent major depressive disorder ([facility] [address] )    Essential hypertension    Age-related cataract of both eyes, unspecified age-related cataract type    Preoperative clearance  -     TD VACCINE GREATER THAN OR EQUAL TO 8YO PRESERVATIVE FREE IM          Subjective:      Patient ID: Kip Castle  is a 77 y o  male  Cataracts  Vision changed  Last Td, 2008  Will update today  The following portions of the patient's history were reviewed and updated as appropriate: allergies, current medications, past family history, past medical history, past social history, past surgical history and problem list     Review of Systems   Constitutional: Negative  HENT: Negative  Eyes: Positive for visual disturbance  Negative for photophobia, pain, discharge, redness and itching  Respiratory: Negative  Cardiovascular: Negative  Gastrointestinal: Negative  Genitourinary: Negative  Musculoskeletal: Negative  Skin: Negative  Neurological: Negative  Psychiatric/Behavioral: Negative  Objective:      /74 (BP Location: Left arm, Patient Position: Sitting, Cuff Size: Standard)   Pulse 84   Temp 97 8 °F (36 6 °C) (Oral)   Ht 5' 5" (1 651 m)   Wt 102 kg (225 lb)   SpO2 98%   BMI 37 44 kg/m²          Physical Exam   Constitutional: He is oriented to person, place, and time  He appears well-developed and well-nourished  HENT:   Head: Normocephalic and atraumatic  Right Ear: External ear normal    Left Ear: External ear normal    Nose: Nose normal    Mouth/Throat: Oropharynx is clear and moist    Eyes: Conjunctivae and EOM are normal  Pupils are equal, round, and reactive to light     Neck: Normal range of motion  Neck supple  Cardiovascular: Normal rate, regular rhythm, normal heart sounds and intact distal pulses  Pulmonary/Chest: Effort normal and breath sounds normal    Abdominal: Soft  Bowel sounds are normal    Neurological: He is alert and oriented to person, place, and time  He has normal reflexes  Skin: Skin is warm and dry  Psychiatric: He has a normal mood and affect   His behavior is normal  Judgment and thought content normal

## 2018-09-04 DIAGNOSIS — E83.52 HYPERCALCEMIA: ICD-10-CM

## 2018-09-04 RX ORDER — POTASSIUM CHLORIDE 750 MG/1
10 TABLET, FILM COATED, EXTENDED RELEASE ORAL DAILY
Qty: 90 TABLET | Refills: 1 | Status: SHIPPED | OUTPATIENT
Start: 2018-09-04 | End: 2018-11-28 | Stop reason: SDUPTHER

## 2018-09-04 NOTE — TELEPHONE ENCOUNTER
Patient called to request refill of Klor-Con 10 mEq 90 day supply to be sent to [facility] pharmacy

## 2018-10-28 DIAGNOSIS — F32.0 CURRENT MILD EPISODE OF MAJOR DEPRESSIVE DISORDER WITHOUT PRIOR EPISODE (HCC): ICD-10-CM

## 2018-10-29 DIAGNOSIS — F32.0 CURRENT MILD EPISODE OF MAJOR DEPRESSIVE DISORDER WITHOUT PRIOR EPISODE (HCC): ICD-10-CM

## 2018-10-29 RX ORDER — ESCITALOPRAM OXALATE 10 MG/1
10 TABLET ORAL DAILY
Qty: 90 TABLET | Refills: 1 | Status: SHIPPED | OUTPATIENT
Start: 2018-10-29 | End: 2018-11-28 | Stop reason: SDUPTHER

## 2018-10-29 RX ORDER — ESCITALOPRAM OXALATE 10 MG/1
10 TABLET ORAL DAILY
Qty: 90 TABLET | Refills: 0 | Status: SHIPPED | OUTPATIENT
Start: 2018-10-29 | End: 2018-10-29 | Stop reason: SDUPTHER

## 2018-10-29 RX ORDER — ESCITALOPRAM OXALATE 10 MG/1
TABLET ORAL
Qty: 90 TABLET | Refills: 1 | OUTPATIENT
Start: 2018-10-29

## 2018-10-29 NOTE — TELEPHONE ENCOUNTER
Patient called to request refill of Escitalopram 10 mg 1 po qd to be sent to [facility] pharmacy

## 2018-10-31 ENCOUNTER — TELEPHONE (OUTPATIENT)
Dept: FAMILY MEDICINE CLINIC | Facility: CLINIC | Age: 68
End: 2018-10-31

## 2018-10-31 NOTE — TELEPHONE ENCOUNTER
Pt called to say his script for Escitalopram was not sent to pharmacy, patient did not realize that is the generic of Lexapro  Spoke with pharmacist he said they received script on 10/29/18 but it's on hold because patient just picked up his script for 90 day supply on 10/9/18  Explained to patient, he said he would check if he has the bottle  Patient left a message saying he found it

## 2018-11-26 ENCOUNTER — TELEPHONE (OUTPATIENT)
Dept: FAMILY MEDICINE CLINIC | Facility: CLINIC | Age: 68
End: 2018-11-26

## 2018-11-26 NOTE — TELEPHONE ENCOUNTER
Natasha Metz the [facility] pharmacist called to say patient requesting an alternative of Ranitidine 300 mg, insurance will cover Famotidine 40 mg for less than half the price  Patient would like to know if this is appropriate for patient to change to  If so, can send new rx to [facility] pharmacy on [address]

## 2018-11-27 DIAGNOSIS — R12 HEART BURN: Primary | ICD-10-CM

## 2018-11-27 DIAGNOSIS — R12 HEART BURN: ICD-10-CM

## 2018-11-27 DIAGNOSIS — I10 ESSENTIAL HYPERTENSION: ICD-10-CM

## 2018-11-27 RX ORDER — FAMOTIDINE 40 MG/1
40 TABLET, FILM COATED ORAL DAILY
Qty: 90 TABLET | Refills: 0 | Status: SHIPPED | OUTPATIENT
Start: 2018-11-27 | End: 2018-11-28 | Stop reason: SDUPTHER

## 2018-11-28 ENCOUNTER — OFFICE VISIT (OUTPATIENT)
Dept: FAMILY MEDICINE CLINIC | Facility: CLINIC | Age: 68
End: 2018-11-28
Payer: COMMERCIAL

## 2018-11-28 VITALS
OXYGEN SATURATION: 97 % | SYSTOLIC BLOOD PRESSURE: 134 MMHG | HEIGHT: 65 IN | HEART RATE: 79 BPM | DIASTOLIC BLOOD PRESSURE: 72 MMHG | TEMPERATURE: 98.6 F | WEIGHT: 217.4 LBS | BODY MASS INDEX: 36.22 KG/M2

## 2018-11-28 DIAGNOSIS — I10 ESSENTIAL HYPERTENSION: ICD-10-CM

## 2018-11-28 DIAGNOSIS — R12 HEART BURN: ICD-10-CM

## 2018-11-28 DIAGNOSIS — E83.52 HYPERCALCEMIA: ICD-10-CM

## 2018-11-28 DIAGNOSIS — F32.0 CURRENT MILD EPISODE OF MAJOR DEPRESSIVE DISORDER WITHOUT PRIOR EPISODE (HCC): ICD-10-CM

## 2018-11-28 DIAGNOSIS — R53.83 FATIGUE, UNSPECIFIED TYPE: Primary | ICD-10-CM

## 2018-11-28 DIAGNOSIS — F41.9 ANXIETY: ICD-10-CM

## 2018-11-28 PROCEDURE — 3008F BODY MASS INDEX DOCD: CPT | Performed by: FAMILY MEDICINE

## 2018-11-28 PROCEDURE — 3078F DIAST BP <80 MM HG: CPT | Performed by: FAMILY MEDICINE

## 2018-11-28 PROCEDURE — 1036F TOBACCO NON-USER: CPT | Performed by: FAMILY MEDICINE

## 2018-11-28 PROCEDURE — 3075F SYST BP GE 130 - 139MM HG: CPT | Performed by: FAMILY MEDICINE

## 2018-11-28 PROCEDURE — 99214 OFFICE O/P EST MOD 30 MIN: CPT | Performed by: FAMILY MEDICINE

## 2018-11-28 PROCEDURE — 1101F PT FALLS ASSESS-DOCD LE1/YR: CPT | Performed by: FAMILY MEDICINE

## 2018-11-28 RX ORDER — ESCITALOPRAM OXALATE 10 MG/1
10 TABLET ORAL DAILY
Qty: 90 TABLET | Refills: 1 | Status: SHIPPED | OUTPATIENT
Start: 2018-11-28 | End: 2019-03-05 | Stop reason: SDUPTHER

## 2018-11-28 RX ORDER — VALSARTAN AND HYDROCHLOROTHIAZIDE 320; 25 MG/1; MG/1
TABLET, FILM COATED ORAL
Qty: 90 TABLET | Refills: 1 | OUTPATIENT
Start: 2018-11-28

## 2018-11-28 RX ORDER — LOSARTAN POTASSIUM AND HYDROCHLOROTHIAZIDE 25; 100 MG/1; MG/1
1 TABLET ORAL DAILY
Qty: 90 TABLET | Refills: 1 | Status: SHIPPED | OUTPATIENT
Start: 2018-11-28 | End: 2019-05-21 | Stop reason: SDUPTHER

## 2018-11-28 RX ORDER — ALPRAZOLAM 0.25 MG/1
0.25 TABLET ORAL 4 TIMES DAILY
Qty: 120 TABLET | Refills: 5 | Status: SHIPPED | OUTPATIENT
Start: 2018-11-28 | End: 2019-03-05 | Stop reason: SDUPTHER

## 2018-11-28 RX ORDER — POTASSIUM CHLORIDE 750 MG/1
10 TABLET, FILM COATED, EXTENDED RELEASE ORAL DAILY
Qty: 90 TABLET | Refills: 1 | Status: SHIPPED | OUTPATIENT
Start: 2018-11-28 | End: 2019-03-05 | Stop reason: SDUPTHER

## 2018-11-28 RX ORDER — LORAZEPAM 1 MG/1
1 TABLET ORAL 3 TIMES DAILY
Qty: 90 TABLET | Refills: 5 | Status: SHIPPED | OUTPATIENT
Start: 2018-11-28 | End: 2019-03-05 | Stop reason: SDUPTHER

## 2018-11-28 RX ORDER — FAMOTIDINE 40 MG/1
40 TABLET, FILM COATED ORAL DAILY
Qty: 90 TABLET | Refills: 1 | Status: SHIPPED | OUTPATIENT
Start: 2018-11-28 | End: 2019-03-05 | Stop reason: SDUPTHER

## 2018-11-28 RX ORDER — RANITIDINE 300 MG/1
TABLET ORAL
Qty: 90 TABLET | Refills: 1 | OUTPATIENT
Start: 2018-11-28

## 2018-11-28 NOTE — PROGRESS NOTES
Assessment/Plan: patient here today for follow up for hypertension     Fall Risk Assesment      Most Recent Value   Fall Risk   One or more falls in the last year:  No   Advised to use a cane or walker to get around safely:  No   Feels unsteady when walking:  No   Steadies self on furniture while walking at home:  No   Worried about falling:  No          No problem-specific Assessment & Plan notes found for this encounter  Diagnoses and all orders for this visit:    Fatigue, unspecified type  -     CBC and differential; Future  -     Testosterone; Future  -     TSH baseline; Future  -     T4, free; Future  -     T3; Future    Anxiety  -     ALPRAZolam (XANAX) 0 25 mg tablet; Take 1 tablet (0 25 mg total) by mouth 4 (four) times a day  -     LORazepam (ATIVAN) 1 mg tablet; Take 1 tablet (1 mg total) by mouth 3 (three) times a day    Current mild episode of major depressive disorder without prior episode (HCC)  -     escitalopram (LEXAPRO) 10 mg tablet; Take 1 tablet (10 mg total) by mouth daily for 180 days    Heart burn  -     famotidine (PEPCID) 40 MG tablet; Take 1 tablet (40 mg total) by mouth daily    Hypercalcemia  -     potassium chloride (KLOR-CON 10) 10 mEq tablet; Take 1 tablet (10 mEq total) by mouth daily for 180 days  -     Comprehensive metabolic panel; Future    Essential hypertension  -     losartan-hydrochlorothiazide (HYZAAR) 100-25 MG per tablet; Take 1 tablet by mouth daily          Subjective:      Patient ID: Karmen Sharif  is a 77 y o  male  Follow up  Decreased erection within last 2 months     PSH: Appy and adhesions, cataracts  Anxiety/depression controlled  No heart burn with Famotidine  Refill HTN Med   History of hypercalcemia          The following portions of the patient's history were reviewed and updated as appropriate: allergies, current medications, past family history, past medical history, past social history, past surgical history and problem list     Current Outpatient Prescriptions:     ALPRAZolam (XANAX) 0 25 mg tablet, Take 1 tablet (0 25 mg total) by mouth 4 (four) times a day, Disp: 120 tablet, Rfl: 5    Ascorbic Acid (VITAMIN C) 100 MG CHEW, Chew 1 tablet daily, Disp: , Rfl:     aspirin 81 MG tablet, Take 1 tablet by mouth daily, Disp: , Rfl:     atenolol (TENORMIN) 100 mg tablet, Take 1 tablet (100 mg total) by mouth 2 (two) times a day, Disp: 180 tablet, Rfl: 1    calcium citrate-vitamin d (CALCIUM CITRATE CHEWY BITE) 500-500 MG-UNIT CHEW chewable tablet, Chew, Disp: , Rfl:     Cholecalciferol (VITAMIN D3) 1000 units CAPS, Take by mouth, Disp: , Rfl:     doxycycline (DORYX) 150 MG EC tablet, Take 150 mg by mouth daily, Disp: , Rfl: 6    escitalopram (LEXAPRO) 10 mg tablet, Take 1 tablet (10 mg total) by mouth daily for 180 days, Disp: 90 tablet, Rfl: 1    famotidine (PEPCID) 40 MG tablet, Take 1 tablet (40 mg total) by mouth daily, Disp: 90 tablet, Rfl: 1    hydrocortisone 2 5 % cream, Apply topically, Disp: , Rfl:     LORazepam (ATIVAN) 1 mg tablet, Take 1 tablet (1 mg total) by mouth 3 (three) times a day, Disp: 90 tablet, Rfl: 5    Magnesium 250 MG TABS, Take by mouth, Disp: , Rfl:     metroNIDAZOLE (METROGEL) 1 % gel, Apply topically, Disp: , Rfl:     Multiple Vitamin (MULTIVITAMINS PO), Take 1 tablet by mouth daily, Disp: , Rfl:     potassium chloride (KLOR-CON 10) 10 mEq tablet, Take 1 tablet (10 mEq total) by mouth daily for 180 days, Disp: 90 tablet, Rfl: 1    econazole nitrate 1 % cream, Apply topically daily, Disp: , Rfl:     losartan-hydrochlorothiazide (HYZAAR) 100-25 MG per tablet, Take 1 tablet by mouth daily, Disp: 90 tablet, Rfl: 1    Probiotic Product (PROBIOTIC-10) CAPS, Take by mouth, Disp: , Rfl:     Review of Systems   Constitutional: Negative  HENT: Negative  Eyes: Negative  Respiratory: Negative  Cardiovascular: Negative  Gastrointestinal: Negative  Genitourinary: Negative  Musculoskeletal: Negative  Skin: Negative  Neurological: Negative  Psychiatric/Behavioral: Negative  Objective:      /72 (BP Location: Left arm, Patient Position: Sitting, Cuff Size: Standard)   Pulse 79   Temp 98 6 °F (37 °C) (Oral)   Ht 5' 5" (1 651 m)   Wt 98 6 kg (217 lb 6 4 oz)   SpO2 97%   BMI 36 18 kg/m²          Physical Exam   Constitutional: He is oriented to person, place, and time  He appears well-developed and well-nourished  HENT:   Head: Normocephalic and atraumatic  Right Ear: External ear normal    Left Ear: External ear normal    Nose: Nose normal    Mouth/Throat: Oropharynx is clear and moist    Eyes: Pupils are equal, round, and reactive to light  Conjunctivae and EOM are normal    Neck: Normal range of motion  Neck supple  Cardiovascular: Normal rate, regular rhythm, normal heart sounds and intact distal pulses  Pulmonary/Chest: Effort normal and breath sounds normal    Abdominal: Soft  Bowel sounds are normal    Genitourinary: Penis normal    Genitourinary Comments: Normal male anatomy  Neurological: He is alert and oriented to person, place, and time  Skin: Skin is warm and dry  Psychiatric: He has a normal mood and affect   His behavior is normal  Judgment and thought content normal

## 2018-11-29 LAB
ALBUMIN SERPL-MCNC: 4.5 G/DL (ref 3.6–5.1)
ALBUMIN/GLOB SERPL: 1.8 (CALC) (ref 1–2.5)
ALP SERPL-CCNC: 58 U/L (ref 40–115)
ALT SERPL-CCNC: 29 U/L (ref 9–46)
AST SERPL-CCNC: 25 U/L (ref 10–35)
BASOPHILS # BLD AUTO: 29 CELLS/UL (ref 0–200)
BASOPHILS NFR BLD AUTO: 0.5 %
BILIRUB SERPL-MCNC: 1.4 MG/DL (ref 0.2–1.2)
BUN SERPL-MCNC: 27 MG/DL (ref 7–25)
BUN/CREAT SERPL: 29 (CALC) (ref 6–22)
CALCIUM SERPL-MCNC: 9.5 MG/DL (ref 8.6–10.3)
CHLORIDE SERPL-SCNC: 102 MMOL/L (ref 98–110)
CO2 SERPL-SCNC: 28 MMOL/L (ref 20–32)
CREAT SERPL-MCNC: 0.94 MG/DL (ref 0.7–1.18)
EOSINOPHIL # BLD AUTO: 120 CELLS/UL (ref 15–500)
EOSINOPHIL NFR BLD AUTO: 2.1 %
ERYTHROCYTE [DISTWIDTH] IN BLOOD BY AUTOMATED COUNT: 12.7 % (ref 11–15)
GLOBULIN SER CALC-MCNC: 2.5 G/DL (CALC) (ref 1.9–3.7)
GLUCOSE SERPL-MCNC: 105 MG/DL (ref 65–139)
HCT VFR BLD AUTO: 51.4 % (ref 38.5–50)
HGB BLD-MCNC: 17.9 G/DL (ref 13.2–17.1)
LYMPHOCYTES # BLD AUTO: 1231 CELLS/UL (ref 850–3900)
LYMPHOCYTES NFR BLD AUTO: 21.6 %
MCH RBC QN AUTO: 33.1 PG (ref 27–33)
MCHC RBC AUTO-ENTMCNC: 34.8 G/DL (ref 32–36)
MCV RBC AUTO: 95 FL (ref 80–100)
MONOCYTES # BLD AUTO: 701 CELLS/UL (ref 200–950)
MONOCYTES NFR BLD AUTO: 12.3 %
NEUTROPHILS # BLD AUTO: 3620 CELLS/UL (ref 1500–7800)
NEUTROPHILS NFR BLD AUTO: 63.5 %
PLATELET # BLD AUTO: 90 THOUSAND/UL (ref 140–400)
PMV BLD REES-ECKER: 12 FL (ref 7.5–12.5)
POTASSIUM SERPL-SCNC: 3.4 MMOL/L (ref 3.5–5.3)
PROT SERPL-MCNC: 7 G/DL (ref 6.1–8.1)
RBC # BLD AUTO: 5.41 MILLION/UL (ref 4.2–5.8)
SL AMB EGFR AFRICAN AMERICAN: 95 ML/MIN/1.73M2
SL AMB EGFR NON AFRICAN AMERICAN: 82 ML/MIN/1.73M2
SODIUM SERPL-SCNC: 139 MMOL/L (ref 135–146)
T3 SERPL-MCNC: 86 NG/DL (ref 76–181)
T4 FREE SERPL-MCNC: 0.9 NG/DL (ref 0.8–1.8)
TESTOST SERPL-MCNC: 284 NG/DL (ref 250–827)
TSH SERPL-ACNC: 1.49 MIU/L (ref 0.4–4.5)
WBC # BLD AUTO: 5.7 THOUSAND/UL (ref 3.8–10.8)

## 2018-11-29 NOTE — PATIENT INSTRUCTIONS
Refill Med's  Labs, non  Fasting, and follow up to review 1 - 2 weeks  Discussed Libido, sudden decrease ?

## 2018-12-05 ENCOUNTER — TELEPHONE (OUTPATIENT)
Dept: FAMILY MEDICINE CLINIC | Facility: CLINIC | Age: 68
End: 2018-12-05

## 2018-12-05 ENCOUNTER — OFFICE VISIT (OUTPATIENT)
Dept: FAMILY MEDICINE CLINIC | Facility: CLINIC | Age: 68
End: 2018-12-05
Payer: COMMERCIAL

## 2018-12-05 VITALS
BODY MASS INDEX: 36.22 KG/M2 | HEIGHT: 65 IN | HEART RATE: 87 BPM | OXYGEN SATURATION: 97 % | TEMPERATURE: 98.4 F | SYSTOLIC BLOOD PRESSURE: 132 MMHG | WEIGHT: 217.4 LBS | DIASTOLIC BLOOD PRESSURE: 76 MMHG

## 2018-12-05 DIAGNOSIS — R68.82 LIBIDO, DECREASED: ICD-10-CM

## 2018-12-05 DIAGNOSIS — Z00.00 MEDICARE ANNUAL WELLNESS VISIT, SUBSEQUENT: Primary | ICD-10-CM

## 2018-12-05 DIAGNOSIS — E83.119 HEMOCHROMATOSIS, UNSPECIFIED HEMOCHROMATOSIS TYPE: ICD-10-CM

## 2018-12-05 DIAGNOSIS — D75.1 POLYCYTHEMIA: ICD-10-CM

## 2018-12-05 DIAGNOSIS — R12 HEART BURN: ICD-10-CM

## 2018-12-05 DIAGNOSIS — I10 ESSENTIAL HYPERTENSION: ICD-10-CM

## 2018-12-05 PROCEDURE — 99214 OFFICE O/P EST MOD 30 MIN: CPT | Performed by: FAMILY MEDICINE

## 2018-12-05 PROCEDURE — G0439 PPPS, SUBSEQ VISIT: HCPCS | Performed by: FAMILY MEDICINE

## 2018-12-05 PROCEDURE — 1170F FXNL STATUS ASSESSED: CPT | Performed by: FAMILY MEDICINE

## 2018-12-05 PROCEDURE — 1125F AMNT PAIN NOTED PAIN PRSNT: CPT | Performed by: FAMILY MEDICINE

## 2018-12-05 RX ORDER — ATENOLOL 100 MG/1
100 TABLET ORAL 2 TIMES DAILY
Qty: 180 TABLET | Refills: 0 | Status: SHIPPED | OUTPATIENT
Start: 2018-12-05 | End: 2019-03-05 | Stop reason: SDUPTHER

## 2018-12-05 NOTE — TELEPHONE ENCOUNTER
Indira from the [facility] line called because she is going to call patient to schedule with Dr Evelina Croft but referral dx listed as hemochromatosis and would like to know if recent iron study was done

## 2018-12-05 NOTE — PROGRESS NOTES
Assessment and Plan:  Problem List Items Addressed This Visit     Essential hypertension    Relevant Medications    atenolol (TENORMIN) 100 mg tablet      Other Visit Diagnoses     Medicare annual wellness visit, subsequent    -  Primary    Hemochromatosis, unspecified hemochromatosis type        Relevant Orders    Ambulatory referral to Hematology / Oncology    Libido, decreased        Heart burn        Polycythemia        Relevant Orders    Ambulatory referral to Oncology        Health Maintenance Due   Topic Date Due    Hepatitis C Screening  1950    SLP PLAN OF CARE  1950    DTaP,Tdap,and Td Vaccines (1 - Tdap) 08/15/2018         HPI:  Patient Active Problem List   Diagnosis    Essential hypertension    Anxiety    Recurrent major depressive disorder ([address] )     Past Medical History:   Diagnosis Date    Cat-scratch disease     Cellulitis     Erysipelas     last assessed-10/14/2013    Parathyroid adenoma     Rosacea     Thrombocytopenia ([facility] )     last assessed-1/16/2017    Tick bites     last assessed-10/14/2013     Past Surgical History:   Procedure Laterality Date    APPENDECTOMY      1998    LYMPHADENECTOMY      THYROID SURGERY      parathyroid surgery     Family History   Problem Relation Age of Onset    Aortic aneurysm Mother     Hypertension Mother     Heart attack Father         acute MI    Breast cancer Paternal Grandmother     Breast cancer Paternal Aunt     Diabetes Paternal Uncle      History   Smoking Status    Former Smoker   Smokeless Tobacco    Never Used     History   Alcohol Use No      History   Drug use: Unknown         Current Outpatient Prescriptions   Medication Sig Dispense Refill    ALPRAZolam (XANAX) 0 25 mg tablet Take 1 tablet (0 25 mg total) by mouth 4 (four) times a day 120 tablet 5    Ascorbic Acid (VITAMIN C) 100 MG CHEW Chew 1 tablet daily      aspirin 81 MG tablet Take 1 tablet by mouth daily      atenolol (TENORMIN) 100 mg tablet Take 1 tablet (100 mg total) by mouth 2 (two) times a day 180 tablet 0    calcium citrate-vitamin d (CALCIUM CITRATE CHEWY BITE) 500-500 MG-UNIT CHEW chewable tablet Chew      Cholecalciferol (VITAMIN D3) 1000 units CAPS Take by mouth      doxycycline (DORYX) 150 MG EC tablet Take 150 mg by mouth daily  6    econazole nitrate 1 % cream Apply topically daily      escitalopram (LEXAPRO) 10 mg tablet Take 1 tablet (10 mg total) by mouth daily for 180 days 90 tablet 1    famotidine (PEPCID) 40 MG tablet Take 1 tablet (40 mg total) by mouth daily 90 tablet 1    hydrocortisone 2 5 % cream Apply topically      LORazepam (ATIVAN) 1 mg tablet Take 1 tablet (1 mg total) by mouth 3 (three) times a day 90 tablet 5    losartan-hydrochlorothiazide (HYZAAR) 100-25 MG per tablet Take 1 tablet by mouth daily 90 tablet 1    Magnesium 250 MG TABS Take by mouth      metroNIDAZOLE (METROGEL) 1 % gel Apply topically      Multiple Vitamin (MULTIVITAMINS PO) Take 1 tablet by mouth daily      potassium chloride (KLOR-CON 10) 10 mEq tablet Take 1 tablet (10 mEq total) by mouth daily for 180 days 90 tablet 1     No current facility-administered medications for this visit  Allergies   Allergen Reactions    Morphine      Immunization History   Administered Date(s) Administered    H1N1, All Formulations 01/09/2010    Influenza 10/22/2007, 10/17/2008, 10/16/2009, 10/22/2010, 09/16/2016    Influenza Quadrivalent, 6-35 Months IM 09/16/2016    Pneumococcal Polysaccharide PPV23 10/22/2007, 12/23/2013    TD (adult) Preservative Free 08/14/2018    Td (adult), adsorbed 06/09/2008    Zoster 12/01/2009, 08/03/2017       Patient Care Team:  Renetta Posey DO as PCP - [address] [place] [address], DO    Medicare Screening Tests and Risk Assessments:  Colton Lira is here for his Subsequent Wellness visit  Health Risk Assessment:  Patient rates overall health as very good  Patient feels that their physical health rating is Slightly better   Eyesight was rated as Same  Hearing was rated as Same  Patient feels that their emotional and mental health rating is Slightly better  Pain experienced by patient in the last 7 days has been None  Patient states that he has experienced no weight loss or gain in last 6 months  Emotional/Mental Health:  Patient has been feeling nervous/anxious  PHQ-9 Depression Screening:    Frequency of the following problems over the past two weeks:      1  Little interest or pleasure in doing things: 0 - not at all      2  Feeling down, depressed, or hopeless: 0 - not at all  PHQ-2 Score: 0          Broken Bones/Falls: Fall Risk Assessment:    In the past year, patient has experienced: No history of falling in past year          Bladder/Bowel:  Patient has not leaked urine accidently in the last six months  Patient reports no loss of bowel control  Immunizations:  Patient has had a flu vaccination within the last year  Patient has received a pneumonia shot  Patient has received a shingles shot  Home Safety:  Patient does not have trouble with stairs inside or outside of their home  Patient currently reports that there are safety hazards present in home , no working smoke alarms, no working carbon monoxide detectors  Preventative Screenings:   prostate cancer screen performed, colon cancer screen completed, glaucoma eye exam completed,     Nutrition:  Current diet: Low Carb with servings of the following:    Medications:  Patient is not currently taking any over-the-counter supplements  Patient is able to manage medications  Lifestyle Choices:  Patient reports current tobacco use  Patient reports no alcohol use  Patient drives a vehicle  Patient wears seat belt          Activities of Daily Living:  Can get out of bed by his or her self, able to dress self, able to make own meals, able to do own shopping, able to bathe self, can do own laundry/housekeeping, can manage own money, pay bills and track expenses    Previous Hospitalizations:  No hospitalization or ED visit in past 12 months        Advanced Directives:  Patient has decided on a power of   Patient has spoken to designated power of   Patient has completed advanced directive  Preventative Screening/Counseling:      Cardiovascular:      General: Screening Current          Colorectal Cancer:      General: Screening Current          Prostate Cancer:      General: Screening Current          Osteoporosis:      General: Screening Not Indicated          AAA:      General: Screening Not Indicated          Glaucoma:      General: Screening Current          HIV:      General: Screening Not Indicated          Hepatitis C:      General: Screening Not Indicated        Advanced Directives:   Patient has living will for healthcare, has durable POA for healthcare, patient has an advanced directive  No exam data present    Physical Exam:  Review of Systems   Constitutional: Negative  HENT: Negative  Eyes: Negative  Respiratory: Negative  Cardiovascular: Negative  Gastrointestinal: Negative  Negative for bowel incontinence  Genitourinary:        Decreased libido  Musculoskeletal: Negative  Skin: Negative  Neurological: Negative  Psychiatric/Behavioral: Negative  The patient is not nervous/anxious  Vitals:    12/05/18 1008   BP: 132/76   BP Location: Left arm   Patient Position: Sitting   Cuff Size: Standard   Pulse: 87   Temp: 98 4 °F (36 9 °C)   TempSrc: Oral   SpO2: 97%   Weight: 98 6 kg (217 lb 6 4 oz)   Height: 5' 5" (1 651 m)   Body mass index is 36 18 kg/m²  Physical Exam   Constitutional: He is oriented to person, place, and time  He appears well-developed and well-nourished  HENT:   Head: Normocephalic     Right Ear: External ear normal    Left Ear: External ear normal    Nose: Nose normal    Mouth/Throat: Oropharynx is clear and moist    Cardiovascular: Normal rate, regular rhythm, normal heart sounds and intact distal pulses  Pulmonary/Chest: Effort normal and breath sounds normal    Abdominal: Soft  Bowel sounds are normal    Genitourinary: Prostate normal    Neurological: He is alert and oriented to person, place, and time  Skin: Skin is warm and dry  Psychiatric: He has a normal mood and affect   His behavior is normal  Judgment and thought content normal

## 2018-12-05 NOTE — PROGRESS NOTES
Assessment/Plan: patient here today for follow up to review BW     No problem-specific Assessment & Plan notes found for this encounter  Diagnoses and all orders for this visit:    Medicare annual wellness visit, subsequent    Essential hypertension  -     atenolol (TENORMIN) 100 mg tablet; Take 1 tablet (100 mg total) by mouth 2 (two) times a day    Hemochromatosis, unspecified hemochromatosis type  -     Ambulatory referral to Hematology / Oncology; Future    Libido, decreased    Heart burn    Polycythemia  -     Ambulatory referral to Oncology; Future          Subjective:      Patient ID: Melissa Pablo  is a 77 y o  male  Follow up, review labs  Correction to diagnosis: Polycythemia not Hemochromatosis            The following portions of the patient's history were reviewed and updated as appropriate: allergies, current medications, past family history, past medical history, past social history, past surgical history and problem list     Allergies   Allergen Reactions    Morphine        Current Outpatient Prescriptions:     ALPRAZolam (XANAX) 0 25 mg tablet, Take 1 tablet (0 25 mg total) by mouth 4 (four) times a day, Disp: 120 tablet, Rfl: 5    Ascorbic Acid (VITAMIN C) 100 MG CHEW, Chew 1 tablet daily, Disp: , Rfl:     aspirin 81 MG tablet, Take 1 tablet by mouth daily, Disp: , Rfl:     atenolol (TENORMIN) 100 mg tablet, Take 1 tablet (100 mg total) by mouth 2 (two) times a day, Disp: 180 tablet, Rfl: 0    calcium citrate-vitamin d (CALCIUM CITRATE CHEWY BITE) 500-500 MG-UNIT CHEW chewable tablet, Chew, Disp: , Rfl:     Cholecalciferol (VITAMIN D3) 1000 units CAPS, Take by mouth, Disp: , Rfl:     doxycycline (DORYX) 150 MG EC tablet, Take 150 mg by mouth daily, Disp: , Rfl: 6    econazole nitrate 1 % cream, Apply topically daily, Disp: , Rfl:     escitalopram (LEXAPRO) 10 mg tablet, Take 1 tablet (10 mg total) by mouth daily for 180 days, Disp: 90 tablet, Rfl: 1    famotidine (PEPCID) 40 MG tablet, Take 1 tablet (40 mg total) by mouth daily, Disp: 90 tablet, Rfl: 1    hydrocortisone 2 5 % cream, Apply topically, Disp: , Rfl:     LORazepam (ATIVAN) 1 mg tablet, Take 1 tablet (1 mg total) by mouth 3 (three) times a day, Disp: 90 tablet, Rfl: 5    losartan-hydrochlorothiazide (HYZAAR) 100-25 MG per tablet, Take 1 tablet by mouth daily, Disp: 90 tablet, Rfl: 1    Magnesium 250 MG TABS, Take by mouth, Disp: , Rfl:     metroNIDAZOLE (METROGEL) 1 % gel, Apply topically, Disp: , Rfl:     Multiple Vitamin (MULTIVITAMINS PO), Take 1 tablet by mouth daily, Disp: , Rfl:     potassium chloride (KLOR-CON 10) 10 mEq tablet, Take 1 tablet (10 mEq total) by mouth daily for 180 days, Disp: 90 tablet, Rfl: 1    Review of Systems   Constitutional: Negative  HENT: Negative  Eyes: Negative  Respiratory: Negative  Cardiovascular: Negative  Gastrointestinal: Negative  Genitourinary: Negative for discharge, genital sores, penile swelling and testicular pain  Decreased libido   Musculoskeletal: Negative  Skin: Negative  Neurological: Negative  Psychiatric/Behavioral: Negative  Objective:      /76 (BP Location: Left arm, Patient Position: Sitting, Cuff Size: Standard)   Pulse 87   Temp 98 4 °F (36 9 °C) (Oral)   Ht 5' 5" (1 651 m)   Wt 98 6 kg (217 lb 6 4 oz)   SpO2 97%   BMI 36 18 kg/m²          Physical Exam   Constitutional: He is oriented to person, place, and time  He appears well-developed and well-nourished  HENT:   Head: Normocephalic and atraumatic  Right Ear: External ear normal    Left Ear: External ear normal    Nose: Nose normal    Mouth/Throat: Oropharynx is clear and moist    Eyes: Pupils are equal, round, and reactive to light  Conjunctivae and EOM are normal    Neck: Normal range of motion  Neck supple  Cardiovascular: Normal rate, regular rhythm, normal heart sounds and intact distal pulses      Pulmonary/Chest: Effort normal and breath sounds normal    Abdominal: Soft  Bowel sounds are normal    Genitourinary: Prostate normal and penis normal    Neurological: He is alert and oriented to person, place, and time  Skin: Skin is warm and dry  Psychiatric: He has a normal mood and affect   His behavior is normal  Judgment and thought content normal

## 2018-12-07 ENCOUNTER — TELEPHONE (OUTPATIENT)
Dept: FAMILY MEDICINE CLINIC | Facility: CLINIC | Age: 68
End: 2018-12-07

## 2018-12-07 NOTE — TELEPHONE ENCOUNTER
Yesika from [facility] said [place] is out of the office until 12/10/18  Explained that Dr Georgiann Hodgkins made diagnosis correction, patient is to be seen for Polycythemia not Hemochromatosis  Yesika states that patient's blood work does not show abnormal red blood cells, but it does show abnormal MCH, hemoglobin and platelets  Please clarify  Patient is scheduled anyway for 12/10/18

## 2018-12-10 ENCOUNTER — CONSULT (OUTPATIENT)
Dept: HEMATOLOGY ONCOLOGY | Facility: CLINIC | Age: 68
End: 2018-12-10
Payer: COMMERCIAL

## 2018-12-10 VITALS
SYSTOLIC BLOOD PRESSURE: 134 MMHG | HEART RATE: 84 BPM | OXYGEN SATURATION: 94 % | DIASTOLIC BLOOD PRESSURE: 78 MMHG | WEIGHT: 223 LBS | BODY MASS INDEX: 37.15 KG/M2 | TEMPERATURE: 98 F | RESPIRATION RATE: 18 BRPM | HEIGHT: 65 IN

## 2018-12-10 DIAGNOSIS — D69.6 THROMBOCYTOPENIA (HCC): Primary | ICD-10-CM

## 2018-12-10 DIAGNOSIS — D58.2 ELEVATED HEMOGLOBIN (HCC): ICD-10-CM

## 2018-12-10 PROCEDURE — 4040F PNEUMOC VAC/ADMIN/RCVD: CPT | Performed by: PHYSICIAN ASSISTANT

## 2018-12-10 PROCEDURE — 99204 OFFICE O/P NEW MOD 45 MIN: CPT | Performed by: PHYSICIAN ASSISTANT

## 2018-12-10 RX ORDER — KETOCONAZOLE 20 MG/G
CREAM TOPICAL
Refills: 2 | COMMUNITY
Start: 2018-11-12

## 2018-12-10 RX ORDER — NEPAFENAC 0.3 %
SUSPENSION, DROPS(FINAL DOSAGE FORM)(ML) OPHTHALMIC (EYE)
Refills: 1 | COMMUNITY
Start: 2018-09-28

## 2018-12-10 NOTE — LETTER
December 11, 2018     Isaiah Abel DO  [facility] [address] [place] [address]    Patient: John Stauffer  YOB: 1950   Date of Visit: 12/10/2018       Dear Dr Jeff Strickland: Thank you for referring Marcos Montaño to me for evaluation  Below are my notes for this consultation  If you have questions, please do not hesitate to call me  I look forward to following your patient along with you  Sincerely,        Nuno Sultana PA-C        CC: No Recipients  Nuno Sultana PA-C  12/11/2018 10:28 PM  Sign at close encounter  Hematology/Oncology Outpatient Consult  John Stauffer  77 y o  male 1950 [id number]    Date:  12/10/2018      Assessment and Plan:  1  Elevated hemoglobin [facility])  42-year-old male presents for consultation regarding elevated hemoglobin  We discussed primary versus secondary causes of this  Evaluate labs as below  Patient is cause may be due to underlying sleep apnea  If below labs suggest this, patient will be referred to sleep study at the next visit  - Ambulatory referral to Hematology / Oncology  - CBC and differential  - LD,Blood; Future  - Erythropoietin  - Carboxyhemoglobin; Future  - JAK2 V617F,Ql,W/RFL Exons 12,13 and MPL A946,N341; Future  - US abdomen complete; Future    2  Thrombocytopenia ([address] )  Patient has chronic thrombocytopenia  Likely this is secondary to liver dysfunction secondary to previous alcoholism which also then may be causing splenomegaly  Ultrasound to further evaluate size of the liver and spleen  If this shows cirrhosis, patient will also then be referred to Gastroenterology for management  - Comprehensive metabolic panel; Future  - US abdomen complete; Future      Discussed with patient and wife that this is not appearing to be a life-threatening bone marrow condition  Labs and ultrasound as above in follow-up in about 1 month      HPI:  42-year-old male presents for consultation regarding elevated hematocrit and thrombocytopenia  PMH significant for HTN, GERD, anxiety, MDD, rosacea  Smoking history- quit smoking , smoked pipe throughout the day for 20 years   6 months ago started smoking Melissa      Lost 46 lbs 6 months as he has only been drinking Viri's shakes  He drinks 2-3 per day  He eats cheese and apples for snacks  Wife states that  snores a lot and wakes in the night and then sleeps during the day time  Denies stopping breathing throughout the day  He also has lack of motivation with his fatigue  He never took testerone  In  he saw hematology for similar reasons and had a work up and then was told he did not need to     Lymph nodes removed in  [facility] in bilateral anterior cervical when he had cat scratch fever and nodes were necrotic  Parathyroid gland removal      Mother  of PE  No personal history of blood clots  Patient has a history of alcoholism  He drank 1-2 bottles of wine, sometimes a bottle of vodka per night  He did this for about 30 years  Labs:  2017  Hemoglobin 17 1, hematocrit 50 8, platelets 79, WBC 5 1    2018  Hemoglobin 17 4, hematocrit 50 1, platelet 89, WBC 4 9    2018  Hemoglobin 17 9, hematocrit 51 4, platelets 89, WBC 5 7    ROS: Review of Systems   Constitutional: Negative for activity change, appetite change, chills, fatigue, fever and unexpected weight change  Eyes: Negative for visual disturbance  Respiratory: Negative for cough, shortness of breath and wheezing  Cardiovascular: Negative for chest pain, palpitations and leg swelling  Gastrointestinal: Negative for abdominal pain, constipation, diarrhea, nausea and vomiting  Genitourinary: Negative for difficulty urinating and dysuria  Skin:        rosacea    Neurological: Negative for dizziness, light-headedness and headaches  Hematological: Negative for adenopathy  Does not bruise/bleed easily         Past Medical History:   Diagnosis Date    Cat-scratch disease     Cellulitis     Erysipelas     last assessed-10/14/2013    Parathyroid adenoma     Rosacea     Thrombocytopenia (HCC)     last assessed-1/16/2017    Tick bites     last assessed-10/14/2013       Past Surgical History:   Procedure Laterality Date    APPENDECTOMY      1998    LYMPHADENECTOMY      THYROID SURGERY      parathyroid surgery       Social History     Social History    Marital status: /Civil Union     Spouse name: N/A    Number of children: N/A    Years of education: N/A     Social History Main Topics    Smoking status: Former Smoker    Smokeless tobacco: Never Used    Alcohol use No    Drug use: Unknown    Sexual activity: Not on file     Other Topics Concern    Not on file     Social History Narrative    Advance directive in chart       Family History   Problem Relation Age of Onset    Aortic aneurysm Mother     Hypertension Mother     Heart attack Father         acute MI    Breast cancer Paternal Grandmother     Breast cancer Paternal Aunt     Diabetes Paternal Uncle        Allergies   Allergen Reactions    Morphine          Current Outpatient Prescriptions:     ALPRAZolam (XANAX) 0 25 mg tablet, Take 1 tablet (0 25 mg total) by mouth 4 (four) times a day, Disp: 120 tablet, Rfl: 5    Ascorbic Acid (VITAMIN C) 100 MG CHEW, Chew 1 tablet daily, Disp: , Rfl:     aspirin 81 MG tablet, Take 1 tablet by mouth daily, Disp: , Rfl:     atenolol (TENORMIN) 100 mg tablet, Take 1 tablet (100 mg total) by mouth 2 (two) times a day, Disp: 180 tablet, Rfl: 0    calcium citrate-vitamin d (CALCIUM CITRATE CHEWY BITE) 500-500 MG-UNIT CHEW chewable tablet, Chew, Disp: , Rfl:     Cholecalciferol (VITAMIN D3) 1000 units CAPS, Take by mouth, Disp: , Rfl:     doxycycline (DORYX) 150 MG EC tablet, Take 150 mg by mouth daily, Disp: , Rfl: 6    econazole nitrate 1 % cream, Apply topically daily, Disp: , Rfl:     escitalopram (LEXAPRO) 10 mg tablet, Take 1 tablet (10 mg total) by mouth daily for 180 days, Disp: 90 tablet, Rfl: 1    famotidine (PEPCID) 40 MG tablet, Take 1 tablet (40 mg total) by mouth daily, Disp: 90 tablet, Rfl: 1    hydrocortisone 2 5 % cream, Apply topically, Disp: , Rfl:     ILEVRO 0 3 % SUSP, INSTILL 1 DROP INTO RIGHT EYE EVERY DAY STARTING DAY OF SURGERY FOR 28 DAYS, Disp: , Rfl: 1    ketoconazole (NIZORAL) 2 % cream, APPLY TO SKIN TWICE DAILY AS DIRECTED, Disp: , Rfl: 2    LORazepam (ATIVAN) 1 mg tablet, Take 1 tablet (1 mg total) by mouth 3 (three) times a day, Disp: 90 tablet, Rfl: 5    losartan-hydrochlorothiazide (HYZAAR) 100-25 MG per tablet, Take 1 tablet by mouth daily, Disp: 90 tablet, Rfl: 1    Magnesium 250 MG TABS, Take by mouth, Disp: , Rfl:     metroNIDAZOLE (METROGEL) 1 % gel, Apply topically, Disp: , Rfl:     Multiple Vitamin (MULTIVITAMINS PO), Take 1 tablet by mouth daily, Disp: , Rfl:     potassium chloride (KLOR-CON 10) 10 mEq tablet, Take 1 tablet (10 mEq total) by mouth daily for 180 days, Disp: 90 tablet, Rfl: 1      Physical Exam:  /78 (BP Location: Left arm, Cuff Size: Large)   Pulse 84   Temp 98 °F (36 7 °C) (Tympanic)   Resp 18   Ht 5' 5" (1 651 m)   Wt 101 kg (223 lb)   SpO2 94%   BMI 37 11 kg/m²      Physical Exam   Constitutional: He is oriented to person, place, and time  He appears well-developed and well-nourished  No distress  HENT:   Head: Normocephalic and atraumatic  Eyes: Conjunctivae are normal  No scleral icterus  Neck: Normal range of motion  Neck supple  Cardiovascular: Normal rate, regular rhythm and normal heart sounds  No murmur heard  Pulmonary/Chest: Effort normal and breath sounds normal  No respiratory distress  Abdominal: Soft  There is no tenderness  Musculoskeletal: Normal range of motion  He exhibits no edema or tenderness  Neurological: He is alert and oriented to person, place, and time  No cranial nerve deficit  Skin: Skin is warm and dry     Psychiatric: He has a normal mood and affect  Vitals reviewed  Labs:  Lab Results   Component Value Date    WBC 5 7 11/28/2018    HGB 17 9 (H) 11/28/2018    HCT 51 4 (H) 11/28/2018    MCV 95 0 11/28/2018    PLT 90 (L) 11/28/2018         Patient voiced understanding and agreement in the above discussion  Aware to contact our office with questions/symptoms in the interim

## 2018-12-18 NOTE — PROGRESS NOTES
Hematology/Oncology Outpatient Consult  Morro Silva  77 y o  male 1950 [id number]    Date:  12/10/2018      Assessment and Plan:  1  Elevated hemoglobin [facility])  49-year-old male presents for consultation regarding elevated hemoglobin  We discussed primary versus secondary causes of this  Evaluate labs as below  Patient is cause may be due to underlying sleep apnea  If below labs suggest this, patient will be referred to sleep study at the next visit  - Ambulatory referral to Hematology / Oncology  - CBC and differential  - LD,Blood; Future  - Erythropoietin  - Carboxyhemoglobin; Future  - JAK2 V617F,Ql,W/RFL Exons 12,13 and MPL Y060,K289; Future  - US abdomen complete; Future    2  Thrombocytopenia ([facility] [address] )  Patient has chronic thrombocytopenia  Likely this is secondary to liver dysfunction secondary to previous alcoholism which also then may be causing splenomegaly  Ultrasound to further evaluate size of the liver and spleen  If this shows cirrhosis, patient will also then be referred to Gastroenterology for management  - Comprehensive metabolic panel; Future  - US abdomen complete; Future      Discussed with patient and wife that this is not appearing to be a life-threatening bone marrow condition  Labs and ultrasound as above in follow-up in about 1 month  HPI:  49-year-old male presents for consultation regarding elevated hematocrit and thrombocytopenia  PMH significant for HTN, GERD, anxiety, MDD, rosacea  Smoking history- quit smoking 1990, smoked pipe throughout the day for 20 years   6 months ago started smoking Melissa      Lost 46 lbs 6 months as he has only been drinking Viri's shakes  He drinks 2-3 per day  He eats cheese and apples for snacks  Wife states that  snores a lot and wakes in the night and then sleeps during the day time  Denies stopping breathing throughout the day  He also has lack of motivation with his fatigue  He never took testerone  In  he saw hematology for similar reasons and had a work up and then was told he did not need to     Lymph nodes removed in  [facility] in bilateral anterior cervical when he had cat scratch fever and nodes were necrotic  Parathyroid gland removal      Mother  of PE  No personal history of blood clots  Patient has a history of alcoholism  He drank 1-2 bottles of wine, sometimes a bottle of vodka per night  He did this for about 30 years  Labs:  2017  Hemoglobin 17 1, hematocrit 50 8, platelets 79, WBC 5 1    2018  Hemoglobin 17 4, hematocrit 50 1, platelet 89, WBC 4 9    2018  Hemoglobin 17 9, hematocrit 51 4, platelets 89, WBC 5 7    ROS: Review of Systems   Constitutional: Negative for activity change, appetite change, chills, fatigue, fever and unexpected weight change  Eyes: Negative for visual disturbance  Respiratory: Negative for cough, shortness of breath and wheezing  Cardiovascular: Negative for chest pain, palpitations and leg swelling  Gastrointestinal: Negative for abdominal pain, constipation, diarrhea, nausea and vomiting  Genitourinary: Negative for difficulty urinating and dysuria  Skin:        rosacea    Neurological: Negative for dizziness, light-headedness and headaches  Hematological: Negative for adenopathy  Does not bruise/bleed easily         Past Medical History:   Diagnosis Date    Cat-scratch disease     Cellulitis     Erysipelas     last assessed-10/14/2013    Parathyroid adenoma     Rosacea     Th[address] ([facility] [address] )     last assessed-2017    Tick bites     last assessed-10/14/2013       Past Surgical History:   Procedure Laterality Date    APPENDECTOMY          LYMPHADENECTOMY      THYROID SURGERY      parathyroid surgery       Social History     Social History    Marital status: /Civil Union     Spouse name: N/A    Number of children: N/A    Years of education: N/A     Social History Main Topics    Smoking status: Former Smoker    Smokeless tobacco: Never Used    Alcohol use No    Drug use: Unknown    Sexual activity: Not on file     Other Topics Concern    Not on file     Social History Narrative    Advance directive in chart       Family History   Problem Relation Age of Onset    Aortic aneurysm Mother     Hypertension Mother     Heart attack Father         acute MI    Breast cancer Paternal Grandmother     Breast cancer Paternal Aunt     Diabetes Paternal Uncle        Allergies   Allergen Reactions    Morphine          Current Outpatient Prescriptions:     ALPRAZolam (XANAX) 0 25 mg tablet, Take 1 tablet (0 25 mg total) by mouth 4 (four) times a day, Disp: 120 tablet, Rfl: 5    Ascorbic Acid (VITAMIN C) 100 MG CHEW, Chew 1 tablet daily, Disp: , Rfl:     aspirin 81 MG tablet, Take 1 tablet by mouth daily, Disp: , Rfl:     atenolol (TENORMIN) 100 mg tablet, Take 1 tablet (100 mg total) by mouth 2 (two) times a day, Disp: 180 tablet, Rfl: 0    calcium citrate-vitamin d (CALCIUM CITRATE CHEWY BITE) 500-500 MG-UNIT CHEW chewable tablet, Chew, Disp: , Rfl:     Cholecalciferol (VITAMIN D3) 1000 units CAPS, Take by mouth, Disp: , Rfl:     doxycycline (DORYX) 150 MG EC tablet, Take 150 mg by mouth daily, Disp: , Rfl: 6    econazole nitrate 1 % cream, Apply topically daily, Disp: , Rfl:     escitalopram (LEXAPRO) 10 mg tablet, Take 1 tablet (10 mg total) by mouth daily for 180 days, Disp: 90 tablet, Rfl: 1    famotidine (PEPCID) 40 MG tablet, Take 1 tablet (40 mg total) by mouth daily, Disp: 90 tablet, Rfl: 1    hydrocortisone 2 5 % cream, Apply topically, Disp: , Rfl:     ILEVRO 0 3 % SUSP, INSTILL 1 DROP INTO RIGHT EYE EVERY DAY STARTING DAY OF SURGERY FOR 28 DAYS, Disp: , Rfl: 1    ketoconazole (NIZORAL) 2 % cream, APPLY TO SKIN TWICE DAILY AS DIRECTED, Disp: , Rfl: 2    LORazepam (ATIVAN) 1 mg tablet, Take 1 tablet (1 mg total) by mouth 3 (three) times a day, Disp: 90 tablet, Rfl: 5   losartan-hydrochlorothiazide (HYZAAR) 100-25 MG per tablet, Take 1 tablet by mouth daily, Disp: 90 tablet, Rfl: 1    Magnesium 250 MG TABS, Take by mouth, Disp: , Rfl:     metroNIDAZOLE (METROGEL) 1 % gel, Apply topically, Disp: , Rfl:     Multiple Vitamin (MULTIVITAMINS PO), Take 1 tablet by mouth daily, Disp: , Rfl:     potassium chloride (KLOR-CON 10) 10 mEq tablet, Take 1 tablet (10 mEq total) by mouth daily for 180 days, Disp: 90 tablet, Rfl: 1      Physical Exam:  /78 (BP Location: Left arm, Cuff Size: Large)   Pulse 84   Temp 98 °F (36 7 °C) (Tympanic)   Resp 18   Ht 5' 5" (1 651 m)   Wt 101 kg (223 lb)   SpO2 94%   BMI 37 11 kg/m²     Physical Exam   Constitutional: He is oriented to person, place, and time  He appears well-developed and well-nourished  No distress  HENT:   Head: Normocephalic and atraumatic  Eyes: Conjunctivae are normal  No scleral icterus  Neck: Normal range of motion  Neck supple  Cardiovascular: Normal rate, regular rhythm and normal heart sounds  No murmur heard  Pulmonary/Chest: Effort normal and breath sounds normal  No respiratory distress  Abdominal: Soft  There is no tenderness  Musculoskeletal: Normal range of motion  He exhibits no edema or tenderness  Neurological: He is alert and oriented to person, place, and time  No cranial nerve deficit  Skin: Skin is warm and dry  Psychiatric: He has a normal mood and affect  Vitals reviewed  Labs:  Lab Results   Component Value Date    WBC 5 7 11/28/2018    HGB 17 9 (H) 11/28/2018    HCT 51 4 (H) 11/28/2018    MCV 95 0 11/28/2018    PLT 90 (L) 11/28/2018         Patient voiced understanding and agreement in the above discussion  Aware to contact our office with questions/symptoms in the interim  Yes...

## 2018-12-20 LAB
ALBUMIN SERPL-MCNC: 4.3 G/DL (ref 3.6–5.1)
ALBUMIN/GLOB SERPL: 2.3 (CALC) (ref 1–2.5)
ALP SERPL-CCNC: 68 U/L (ref 40–115)
ALT SERPL-CCNC: 27 U/L (ref 9–46)
ASSAY DETAILS: NORMAL
AST SERPL-CCNC: 27 U/L (ref 10–35)
BASOPHILS # BLD AUTO: 18 CELLS/UL (ref 0–200)
BASOPHILS NFR BLD AUTO: 0.3 %
BILIRUB SERPL-MCNC: 1.1 MG/DL (ref 0.2–1.2)
BUN SERPL-MCNC: 28 MG/DL (ref 7–25)
BUN/CREAT SERPL: 30 (CALC) (ref 6–22)
CALCIUM SERPL-MCNC: 9.3 MG/DL (ref 8.6–10.3)
CALR EXON 9 MUT ANL BLD/T: NOT DETECTED
CHLORIDE SERPL-SCNC: 103 MMOL/L (ref 98–110)
CLINICAL INFO: NORMAL
CO2 SERPL-SCNC: 27 MMOL/L (ref 20–32)
COHGB MFR BLD: 4 %TOTAL HGB
CREAT SERPL-MCNC: 0.92 MG/DL (ref 0.7–1.18)
CSF3R EXON 14/17 MUTATION: NOT DETECTED
EOSINOPHIL # BLD AUTO: 153 CELLS/UL (ref 15–500)
EOSINOPHIL NFR BLD AUTO: 2.5 %
EPO SERPL-ACNC: 17.9 MIU/ML (ref 2.6–18.5)
ERYTHROCYTE [DISTWIDTH] IN BLOOD BY AUTOMATED COUNT: 12.3 % (ref 11–15)
GLOBULIN SER CALC-MCNC: 1.9 G/DL (CALC) (ref 1.9–3.7)
GLUCOSE SERPL-MCNC: 92 MG/DL (ref 65–139)
HCT VFR BLD AUTO: 47.9 % (ref 38.5–50)
HGB BLD-MCNC: 17 G/DL (ref 13.2–17.1)
JAK2 EXON 12 MUT ANL BLD/T: NOT DETECTED
JAK2 V617F MUTATION: NOT DETECTED
LDH SERPL-CCNC: 146 U/L (ref 120–250)
LYMPHOCYTES # BLD AUTO: 1214 CELLS/UL (ref 850–3900)
LYMPHOCYTES NFR BLD AUTO: 19.9 %
MCH RBC QN AUTO: 33.4 PG (ref 27–33)
MCHC RBC AUTO-ENTMCNC: 35.5 G/DL (ref 32–36)
MCV RBC AUTO: 94.1 FL (ref 80–100)
MICRODELETION SYND BLD/T FISH: NORMAL
MONOCYTES # BLD AUTO: 695 CELLS/UL (ref 200–950)
MONOCYTES NFR BLD AUTO: 11.4 %
MPL EXON 10 MUTATION: NOT DETECTED
NEUTROPHILS # BLD AUTO: 4020 CELLS/UL (ref 1500–7800)
NEUTROPHILS NFR BLD AUTO: 65.9 %
PLATELET # BLD AUTO: 92 THOUSAND/UL (ref 140–400)
PMV BLD REES-ECKER: 12.5 FL (ref 7.5–12.5)
POTASSIUM SERPL-SCNC: 3.4 MMOL/L (ref 3.5–5.3)
PROT SERPL-MCNC: 6.2 G/DL (ref 6.1–8.1)
RBC # BLD AUTO: 5.09 MILLION/UL (ref 4.2–5.8)
SL AMB BLOCK/SAMPLE NUMBER: NORMAL
SL AMB EGFR AFRICAN AMERICAN: 97 ML/MIN/1.73M2
SL AMB EGFR NON AFRICAN AMERICAN: 84 ML/MIN/1.73M2
SODIUM SERPL-SCNC: 142 MMOL/L (ref 135–146)
SPECIMEN SOURCE: NORMAL
WBC # BLD AUTO: 6.1 THOUSAND/UL (ref 3.8–10.8)

## 2019-01-03 ENCOUNTER — HOSPITAL ENCOUNTER (OUTPATIENT)
Dept: ULTRASOUND IMAGING | Facility: HOSPITAL | Age: 69
Discharge: HOME | End: 2019-01-03
Payer: COMMERCIAL

## 2019-01-03 DIAGNOSIS — D58.2 ELEVATED HEMOGLOBIN (HCC): ICD-10-CM

## 2019-01-03 DIAGNOSIS — D69.6 THROMBOCYTOPENIA (HCC): ICD-10-CM

## 2019-01-03 PROCEDURE — 76700 US EXAM ABDOM COMPLETE: CPT

## 2019-01-15 ENCOUNTER — OFFICE VISIT (OUTPATIENT)
Dept: HEMATOLOGY ONCOLOGY | Facility: CLINIC | Age: 69
End: 2019-01-15
Payer: COMMERCIAL

## 2019-01-15 VITALS
DIASTOLIC BLOOD PRESSURE: 70 MMHG | BODY MASS INDEX: 35.82 KG/M2 | HEIGHT: 65 IN | SYSTOLIC BLOOD PRESSURE: 122 MMHG | RESPIRATION RATE: 16 BRPM | WEIGHT: 215 LBS | TEMPERATURE: 97.8 F | HEART RATE: 72 BPM

## 2019-01-15 DIAGNOSIS — D69.6 THROMBOCYTOPENIA (HCC): Primary | ICD-10-CM

## 2019-01-15 DIAGNOSIS — D58.2 ELEVATED HEMOGLOBIN (HCC): ICD-10-CM

## 2019-01-15 PROCEDURE — 99213 OFFICE O/P EST LOW 20 MIN: CPT | Performed by: PHYSICIAN ASSISTANT

## 2019-01-15 NOTE — PROGRESS NOTES
Hematology/Oncology Outpatient Follow-up  Yodit Martinez  77 y o  male 1950 [id number]    Date:  1/15/2019      Assessment and Plan:  1  Thrombocytopenia ([address] ), 2  Elevated hemoglobin [facility])  56-year-old male presents for follow-up regarding elevated hemoglobin and thrombocytopenia  Repeat CBC showed hemoglobin of 17  Workup for erythrocytosis was negative (JAK2 mutation negative, carboxyhemoglobin within normal levels)  Will continue to monitor  Again advised patient that he should not be used juul smoking device  Thrombocytopenia is most likely related to mild hepatic steatosis with mild splenomegaly  His platelet count has fluctuated over the past 2 years at least   Previously platelet count was in the 60-04735 range  Most recently platelets have been stable in 90,000 range  Discussed that he is not at increased risk of bleeding  Again this is something that will be monitored  No treatment is warranted  Patient recorded audio of this visit on his cell phone  He stated that this will be for his personal use only and not be distributed with anyone else or posted on any Internet site  - CBC and differential      HPI:  56-year-old male presents for consultation regarding elevated hematocrit and thrombocytopenia       PMH significant for HTN, GERD, anxiety, MDD, rosacea      Smoking history- quit smoking 1990, smoked pipe throughout the day for 20 years   6 months ago started smoking Melissa       Lost 46 lbs 6 months as he has only been drinking Viri's shakes  He drinks 2-3 per day  He eats cheese and apples for snacks       Wife states that  snores a lot and wakes in the night and then sleeps during the day time   Denies stopping breathing throughout the day      He also has lack of motivation with his fatigue       He never took testerone       In 2003 he saw hematology for similar reasons and had a work up and then was told he did not need to      Lymph nodes removed in 1992 in bilateral anterior cervical when he had cat scratch fever and nodes were necrotic      Parathyroid gland removal       Mother  of PE  No personal history of blood clots       Patient has a history of alcoholism  He drank 1-2 bottles of wine, sometimes a bottle of vodka per night  He did this for about 30 years       Labs:  2017  Hemoglobin 17 1, hematocrit 50 8, platelets 79, WBC 5 1     2018  Hemoglobin 17 4, hematocrit 50 1, platelet 89, WBC 4 9     2018  Hemoglobin 17 9, hematocrit 51 4, platelets 89, WBC 5 7      ROS: Review of Systems   Constitutional: Negative for appetite change, chills, fatigue, fever and unexpected weight change  Continued weight loss with diet changes    HENT: Negative for nosebleeds  Respiratory: Negative for cough, chest tightness, shortness of breath and wheezing  Cardiovascular: Negative for chest pain, palpitations and leg swelling  Gastrointestinal: Negative for abdominal pain, blood in stool, constipation, diarrhea, nausea and vomiting  Genitourinary: Negative for difficulty urinating, dysuria and hematuria  Musculoskeletal: Negative for arthralgias  Skin: Negative  Neurological: Negative for dizziness, weakness, light-headedness, numbness and headaches  Hematological: Negative  Psychiatric/Behavioral: Negative          Past Medical History:   Diagnosis Date    Cat-scratch disease     Cellulitis     Erysipelas     last assessed-10/14/2013    Parathyroid adenoma     Rosacea     Thrombocyt[address] ([facility] [address] )     last assessed-2017    Tick bites     last assessed-10/14/2013       Past Surgical History:   Procedure Laterality Date    APPENDECTOMY      1998    LYMPHADENECTOMY      THYROID SURGERY      parathyroid surgery       Social History     Social History    Marital status: /Civil Union     Spouse name: N/A    Number of children: N/A    Years of education: N/A     Social History Main Topics    Smoking status: Former Smoker    Smokeless tobacco: Never Used    Alcohol use No    Drug use: Unknown    Sexual activity: Not on file     Other Topics Concern    Not on file     Social History Narrative    Advance directive in chart       Family History   Problem Relation Age of Onset    Aortic aneurysm Mother     Hypertension Mother     Heart attack Father         acute MI    Breast cancer Paternal Grandmother     Breast cancer Paternal Aunt     Diabetes Paternal Uncle        Allergies   Allergen Reactions    Morphine          Current Outpatient Prescriptions:     ALPRAZolam (XANAX) 0 25 mg tablet, Take 1 tablet (0 25 mg total) by mouth 4 (four) times a day, Disp: 120 tablet, Rfl: 5    Ascorbic Acid (VITAMIN C) 100 MG CHEW, Chew 1 tablet daily, Disp: , Rfl:     aspirin 81 MG tablet, Take 1 tablet by mouth daily, Disp: , Rfl:     atenolol (TENORMIN) 100 mg tablet, Take 1 tablet (100 mg total) by mouth 2 (two) times a day, Disp: 180 tablet, Rfl: 0    calcium citrate-vitamin d (CALCIUM CITRATE CHEWY BITE) 500-500 MG-UNIT CHEW chewable tablet, Chew, Disp: , Rfl:     Cholecalciferol (VITAMIN D3) 1000 units CAPS, Take by mouth, Disp: , Rfl:     doxycycline (DORYX) 150 MG EC tablet, Take 150 mg by mouth daily, Disp: , Rfl: 6    econazole nitrate 1 % cream, Apply topically daily, Disp: , Rfl:     escitalopram (LEXAPRO) 10 mg tablet, Take 1 tablet (10 mg total) by mouth daily for 180 days, Disp: 90 tablet, Rfl: 1    famotidine (PEPCID) 40 MG tablet, Take 1 tablet (40 mg total) by mouth daily, Disp: 90 tablet, Rfl: 1    hydrocortisone 2 5 % cream, Apply topically, Disp: , Rfl:     ILEVRO 0 3 % SUSP, INSTILL 1 DROP INTO RIGHT EYE EVERY DAY STARTING DAY OF SURGERY FOR 28 DAYS, Disp: , Rfl: 1    ketoconazole (NIZORAL) 2 % cream, APPLY TO SKIN TWICE DAILY AS DIRECTED, Disp: , Rfl: 2    LORazepam (ATIVAN) 1 mg tablet, Take 1 tablet (1 mg total) by mouth 3 (three) times a day, Disp: 90 tablet, Rfl: 5   losartan-hydrochlorothiazide (HYZAAR) 100-25 MG per tablet, Take 1 tablet by mouth daily, Disp: 90 tablet, Rfl: 1    Magnesium 250 MG TABS, Take by mouth, Disp: , Rfl:     metroNIDAZOLE (METROGEL) 1 % gel, Apply topically, Disp: , Rfl:     Multiple Vitamin (MULTIVITAMINS PO), Take 1 tablet by mouth daily, Disp: , Rfl:     potassium chloride (KLOR-CON 10) 10 mEq tablet, Take 1 tablet (10 mEq total) by mouth daily for 180 days, Disp: 90 tablet, Rfl: 1      Physical Exam:  /70   Pulse 72   Temp 97 8 °F (36 6 °C)   Resp 16   Ht 5' 5" (1 651 m)   Wt 97 5 kg (215 lb)   BMI 35 78 kg/m²     Physical Exam   Constitutional: He is oriented to person, place, and time  He appears well-developed and well-nourished  No distress  HENT:   Head: Normocephalic and atraumatic  Eyes: Conjunctivae are normal  No scleral icterus  Neck: Normal range of motion  Neck supple  Cardiovascular: Normal rate, regular rhythm and normal heart sounds  No murmur heard  Pulmonary/Chest: Effort normal and breath sounds normal  No respiratory distress  Abdominal: Soft  There is no tenderness  Musculoskeletal: Normal range of motion  He exhibits no edema or tenderness  Neurological: He is alert and oriented to person, place, and time  No cranial nerve deficit  Skin: Skin is warm and dry  Psychiatric: He has a normal mood and affect  Labs:  Lab Results   Component Value Date    WBC 6 1 12/11/2018    HGB 17 0 12/11/2018    HCT 47 9 12/11/2018    MCV 94 1 12/11/2018    PLT 92 (L) 12/11/2018     Lab Results   Component Value Date     06/10/2017    K 3 4 (L) 12/11/2018     12/11/2018    CO2 27 12/11/2018    ANIONGAP 6 04/16/2013    BUN 28 (H) 12/11/2018    CREATININE 0 95 06/10/2017    CALCIUM 9 3 12/11/2018    AST 29 12/23/2016    ALT 35 12/23/2016    ALKPHOS 68 12/11/2018    PROT 6 3 12/23/2016    BILITOT 1 2 12/23/2016       Patient voiced understanding and agreement in the above discussion   Aware to contact our office with questions/symptoms in the interim

## 2019-03-05 ENCOUNTER — OFFICE VISIT (OUTPATIENT)
Dept: FAMILY MEDICINE CLINIC | Facility: CLINIC | Age: 69
End: 2019-03-05
Payer: COMMERCIAL

## 2019-03-05 VITALS
OXYGEN SATURATION: 98 % | DIASTOLIC BLOOD PRESSURE: 76 MMHG | SYSTOLIC BLOOD PRESSURE: 138 MMHG | WEIGHT: 214.2 LBS | TEMPERATURE: 98.7 F | HEIGHT: 65 IN | HEART RATE: 85 BPM | BODY MASS INDEX: 35.69 KG/M2

## 2019-03-05 DIAGNOSIS — D69.6 THROMBOCYTOPENIA (HCC): Primary | ICD-10-CM

## 2019-03-05 DIAGNOSIS — E83.52 HYPERCALCEMIA: ICD-10-CM

## 2019-03-05 DIAGNOSIS — R12 HEART BURN: ICD-10-CM

## 2019-03-05 DIAGNOSIS — F41.9 ANXIETY: ICD-10-CM

## 2019-03-05 DIAGNOSIS — I10 ESSENTIAL HYPERTENSION: ICD-10-CM

## 2019-03-05 DIAGNOSIS — F32.0 CURRENT MILD EPISODE OF MAJOR DEPRESSIVE DISORDER WITHOUT PRIOR EPISODE (HCC): ICD-10-CM

## 2019-03-05 PROCEDURE — 3078F DIAST BP <80 MM HG: CPT | Performed by: FAMILY MEDICINE

## 2019-03-05 PROCEDURE — 1160F RVW MEDS BY RX/DR IN RCRD: CPT | Performed by: FAMILY MEDICINE

## 2019-03-05 PROCEDURE — 99215 OFFICE O/P EST HI 40 MIN: CPT | Performed by: FAMILY MEDICINE

## 2019-03-05 PROCEDURE — 3075F SYST BP GE 130 - 139MM HG: CPT | Performed by: FAMILY MEDICINE

## 2019-03-05 RX ORDER — POTASSIUM CHLORIDE 750 MG/1
10 TABLET, FILM COATED, EXTENDED RELEASE ORAL DAILY
Qty: 90 TABLET | Refills: 1 | Status: SHIPPED | OUTPATIENT
Start: 2019-03-05 | End: 2019-06-05 | Stop reason: SDUPTHER

## 2019-03-05 RX ORDER — ESCITALOPRAM OXALATE 10 MG/1
10 TABLET ORAL DAILY
Qty: 90 TABLET | Refills: 1 | Status: SHIPPED | OUTPATIENT
Start: 2019-03-05 | End: 2019-06-05 | Stop reason: SDUPTHER

## 2019-03-05 RX ORDER — LORAZEPAM 1 MG/1
1 TABLET ORAL 3 TIMES DAILY
Qty: 90 TABLET | Refills: 5 | Status: SHIPPED | OUTPATIENT
Start: 2019-03-05 | End: 2019-06-05 | Stop reason: SDUPTHER

## 2019-03-05 RX ORDER — ALPRAZOLAM 0.25 MG/1
0.25 TABLET ORAL 4 TIMES DAILY
Qty: 120 TABLET | Refills: 5 | Status: SHIPPED | OUTPATIENT
Start: 2019-03-05 | End: 2019-06-05 | Stop reason: SDUPTHER

## 2019-03-05 RX ORDER — FAMOTIDINE 40 MG/1
40 TABLET, FILM COATED ORAL DAILY
Qty: 90 TABLET | Refills: 1 | Status: SHIPPED | OUTPATIENT
Start: 2019-03-05 | End: 2019-06-05 | Stop reason: SDUPTHER

## 2019-03-05 RX ORDER — ATENOLOL 100 MG/1
100 TABLET ORAL 2 TIMES DAILY
Qty: 180 TABLET | Refills: 0 | Status: SHIPPED | OUTPATIENT
Start: 2019-03-05 | End: 2019-06-05 | Stop reason: SDUPTHER

## 2019-03-05 NOTE — PROGRESS NOTES
Assessment/Plan: pt here today for follow up for hypertension   Pt stated that he is losing weight     No problem-specific Assessment & Plan notes found for this encounter  Diagnoses and all orders for this visit:    Thrombocytopenia ([facility] [address] )    Current mild episode of major depressive disorder without prior episode ([facility] [address] )  -     escitalopram (LEXAPRO) 10 mg tablet; Take 1 tablet (10 mg total) by mouth daily for 180 days    Heart burn  -     famotidine (PEPCID) 40 MG tablet; Take 1 tablet (40 mg total) by mouth daily    Anxiety  -     LORazepam (ATIVAN) 1 mg tablet; Take 1 tablet (1 mg total) by mouth 3 (three) times a day  -     ALPRAZolam (XANAX) 0 25 mg tablet; Take 1 tablet (0 25 mg total) by mouth 4 (four) times a day    Hypercalcemia  -     potassium chloride (KLOR-CON 10) 10 mEq tablet; Take 1 tablet (10 mEq total) by mouth daily for 180 days    Essential hypertension  -     atenolol (TENORMIN) 100 mg tablet; Take 1 tablet (100 mg total) by mouth 2 (two) times a day          Subjective:      Patient ID: Melissa Pablo  is a 77 y o  male  Follow up  Changed diet habits for weight reduction  Seen by Hematology  Anxiety/depression controlled  Reflux improved  Continues with Atenolol for HTN  Aspirin is stopped today  Will recheck Ca++ level  SH: No OH, using fake tobacco inhaler  FH: N/C        The following portions of the patient's history were reviewed and updated as appropriate: allergies, current medications, past family history, past medical history, past social history, past surgical history and problem list     Current Outpatient Medications:     ALPRAZolam (XANAX) 0 25 mg tablet, Take 1 tablet (0 25 mg total) by mouth 4 (four) times a day, Disp: 120 tablet, Rfl: 5    Ascorbic Acid (VITAMIN C) 100 MG CHEW, Chew 1 tablet daily, Disp: , Rfl:     atenolol (TENORMIN) 100 mg tablet, Take 1 tablet (100 mg total) by mouth 2 (two) times a day, Disp: 180 tablet, Rfl: 0    calcium citrate-vitamin d (CALCIUM CITRATE CHEWY BITE) 500-500 MG-UNIT CHEW chewable tablet, Chew, Disp: , Rfl:     Cholecalciferol (VITAMIN D3) 1000 units CAPS, Take by mouth, Disp: , Rfl:     doxycycline (DORYX) 150 MG EC tablet, Take 150 mg by mouth daily, Disp: , Rfl: 6    econazole nitrate 1 % cream, Apply topically daily, Disp: , Rfl:     escitalopram (LEXAPRO) 10 mg tablet, Take 1 tablet (10 mg total) by mouth daily for 180 days, Disp: 90 tablet, Rfl: 1    famotidine (PEPCID) 40 MG tablet, Take 1 tablet (40 mg total) by mouth daily, Disp: 90 tablet, Rfl: 1    hydrocortisone 2 5 % cream, Apply topically, Disp: , Rfl:     ketoconazole (NIZORAL) 2 % cream, APPLY TO SKIN TWICE DAILY AS DIRECTED, Disp: , Rfl: 2    LORazepam (ATIVAN) 1 mg tablet, Take 1 tablet (1 mg total) by mouth 3 (three) times a day, Disp: 90 tablet, Rfl: 5    losartan-hydrochlorothiazide (HYZAAR) 100-25 MG per tablet, Take 1 tablet by mouth daily, Disp: 90 tablet, Rfl: 1    Magnesium 250 MG TABS, Take by mouth, Disp: , Rfl:     metroNIDAZOLE (METROGEL) 1 % gel, Apply topically, Disp: , Rfl:     Multiple Vitamin (MULTIVITAMINS PO), Take 1 tablet by mouth daily, Disp: , Rfl:     potassium chloride (KLOR-CON 10) 10 mEq tablet, Take 1 tablet (10 mEq total) by mouth daily for 180 days, Disp: 90 tablet, Rfl: 1    ILEVRO 0 3 % SUSP, INSTILL 1 DROP INTO RIGHT EYE EVERY DAY STARTING DAY OF SURGERY FOR 28 DAYS, Disp: , Rfl: 1     Allergies   Allergen Reactions    Morphine        Review of Systems   Constitutional: Negative  HENT: Negative  Eyes: Negative  Respiratory: Negative  Cardiovascular: Negative  Gastrointestinal: Negative  Genitourinary: Negative  Musculoskeletal: Negative  Skin: Negative  Neurological: Negative  Psychiatric/Behavioral: Negative            Objective:      /76 (BP Location: Left arm, Patient Position: Sitting, Cuff Size: Standard)   Pulse 85   Temp 98 7 °F (37 1 °C) (Oral)   Ht 5' 5" (1 651 m)   Wt 97 2 kg (214 lb 3 2 oz)   SpO2 98%   BMI 35 64 kg/m²          Physical Exam   Constitutional: He is oriented to person, place, and time  He appears well-developed and well-nourished  HENT:   Head: Normocephalic and atraumatic  Right Ear: External ear normal    Left Ear: External ear normal    Nose: Nose normal    Mouth/Throat: Oropharynx is clear and moist    Eyes: Pupils are equal, round, and reactive to light  Conjunctivae and EOM are normal    Neck: Normal range of motion  Neck supple  Cardiovascular: Normal rate, regular rhythm, normal heart sounds and intact distal pulses  Pulmonary/Chest: Effort normal and breath sounds normal    Abdominal: Soft  Bowel sounds are normal    Neurological: He is alert and oriented to person, place, and time  He has normal reflexes  Skin: Skin is warm and dry  Psychiatric: He has a normal mood and affect   His behavior is normal  Judgment and thought content normal

## 2019-05-21 DIAGNOSIS — I10 ESSENTIAL HYPERTENSION: ICD-10-CM

## 2019-05-21 RX ORDER — LOSARTAN POTASSIUM AND HYDROCHLOROTHIAZIDE 25; 100 MG/1; MG/1
1 TABLET ORAL DAILY
Qty: 90 TABLET | Refills: 1 | Status: SHIPPED | OUTPATIENT
Start: 2019-05-21 | End: 2019-10-09 | Stop reason: SDUPTHER

## 2019-06-05 ENCOUNTER — OFFICE VISIT (OUTPATIENT)
Dept: FAMILY MEDICINE CLINIC | Facility: CLINIC | Age: 69
End: 2019-06-05
Payer: COMMERCIAL

## 2019-06-05 VITALS
DIASTOLIC BLOOD PRESSURE: 76 MMHG | BODY MASS INDEX: 35.22 KG/M2 | TEMPERATURE: 98.5 F | HEIGHT: 65 IN | HEART RATE: 79 BPM | OXYGEN SATURATION: 96 % | SYSTOLIC BLOOD PRESSURE: 134 MMHG | WEIGHT: 211.4 LBS

## 2019-06-05 DIAGNOSIS — R12 HEART BURN: ICD-10-CM

## 2019-06-05 DIAGNOSIS — I10 ESSENTIAL HYPERTENSION: ICD-10-CM

## 2019-06-05 DIAGNOSIS — E83.52 HYPERCALCEMIA: ICD-10-CM

## 2019-06-05 DIAGNOSIS — F32.0 CURRENT MILD EPISODE OF MAJOR DEPRESSIVE DISORDER WITHOUT PRIOR EPISODE (HCC): ICD-10-CM

## 2019-06-05 DIAGNOSIS — F41.9 ANXIETY: ICD-10-CM

## 2019-06-05 PROCEDURE — 3008F BODY MASS INDEX DOCD: CPT | Performed by: FAMILY MEDICINE

## 2019-06-05 PROCEDURE — 3078F DIAST BP <80 MM HG: CPT | Performed by: FAMILY MEDICINE

## 2019-06-05 PROCEDURE — 1036F TOBACCO NON-USER: CPT | Performed by: FAMILY MEDICINE

## 2019-06-05 PROCEDURE — 99215 OFFICE O/P EST HI 40 MIN: CPT | Performed by: FAMILY MEDICINE

## 2019-06-05 PROCEDURE — 1160F RVW MEDS BY RX/DR IN RCRD: CPT | Performed by: FAMILY MEDICINE

## 2019-06-05 PROCEDURE — 3075F SYST BP GE 130 - 139MM HG: CPT | Performed by: FAMILY MEDICINE

## 2019-06-05 RX ORDER — LORAZEPAM 1 MG/1
1 TABLET ORAL 3 TIMES DAILY
Qty: 90 TABLET | Refills: 5 | Status: SHIPPED | OUTPATIENT
Start: 2019-06-05 | End: 2019-10-09 | Stop reason: SDUPTHER

## 2019-06-05 RX ORDER — ESCITALOPRAM OXALATE 10 MG/1
10 TABLET ORAL DAILY
Qty: 90 TABLET | Refills: 1 | Status: SHIPPED | OUTPATIENT
Start: 2019-06-05 | End: 2019-10-09 | Stop reason: SDUPTHER

## 2019-06-05 RX ORDER — ALPRAZOLAM 0.25 MG/1
0.25 TABLET ORAL 4 TIMES DAILY
Qty: 120 TABLET | Refills: 5 | Status: SHIPPED | OUTPATIENT
Start: 2019-06-05 | End: 2019-10-09 | Stop reason: SDUPTHER

## 2019-06-05 RX ORDER — POTASSIUM CHLORIDE 750 MG/1
10 TABLET, FILM COATED, EXTENDED RELEASE ORAL DAILY
Qty: 90 TABLET | Refills: 1 | Status: SHIPPED | OUTPATIENT
Start: 2019-06-05 | End: 2019-10-09 | Stop reason: SDUPTHER

## 2019-06-05 RX ORDER — ATENOLOL 100 MG/1
100 TABLET ORAL 2 TIMES DAILY
Qty: 180 TABLET | Refills: 0 | Status: SHIPPED | OUTPATIENT
Start: 2019-06-05 | End: 2019-10-09 | Stop reason: SDUPTHER

## 2019-06-05 RX ORDER — FAMOTIDINE 40 MG/1
40 TABLET, FILM COATED ORAL DAILY
Qty: 90 TABLET | Refills: 1 | Status: SHIPPED | OUTPATIENT
Start: 2019-06-05 | End: 2020-02-12 | Stop reason: SDUPTHER

## 2019-07-29 ENCOUNTER — TELEPHONE (OUTPATIENT)
Dept: HEMATOLOGY ONCOLOGY | Facility: CLINIC | Age: 69
End: 2019-07-29

## 2019-07-29 NOTE — TELEPHONE ENCOUNTER
NIKIAM informing patient that his labs need to be completed prior to his appt that on 7/30/19 with Dr Sincere Reid at the [facility] location  Instructed patient to call the office to R/S if he cannot get his labs completed

## 2019-07-30 ENCOUNTER — OFFICE VISIT (OUTPATIENT)
Dept: HEMATOLOGY ONCOLOGY | Facility: CLINIC | Age: 69
End: 2019-07-30
Payer: COMMERCIAL

## 2019-07-30 VITALS
SYSTOLIC BLOOD PRESSURE: 146 MMHG | BODY MASS INDEX: 34.47 KG/M2 | OXYGEN SATURATION: 97 % | TEMPERATURE: 97.6 F | HEART RATE: 65 BPM | HEIGHT: 66 IN | WEIGHT: 214.5 LBS | RESPIRATION RATE: 20 BRPM | DIASTOLIC BLOOD PRESSURE: 88 MMHG

## 2019-07-30 DIAGNOSIS — D69.6 THROMBOCYTOPENIA (HCC): Primary | ICD-10-CM

## 2019-07-30 DIAGNOSIS — D75.1 ERYTHROCYTOSIS: ICD-10-CM

## 2019-07-30 PROCEDURE — 99214 OFFICE O/P EST MOD 30 MIN: CPT | Performed by: INTERNAL MEDICINE

## 2019-07-30 NOTE — PROGRESS NOTES
HPI:   Patient is here with his wife  He is being followed for erythrocytosis and thrombocytopenia, chronic hematological problems  Negative JAK2 mutation test   Upper normal erythropoietin level  Normal oxygen saturation test   He had ultrasound of the abdomen that showed mild steatosis of the liver and top normal spleen size 11 9, cholelithiasis and bilateral lower pole renal cysts  See report below  He quit smoking cigarettes in 1990  No alcohol  Patient does not offer any symptoms  He is wearing hearing aids    Sourav Byrd never took testerone  In 2003 he saw hematology for similar reasons and had a work up and then was told he did not need to      Lymph nodes removed in 1992 in bilateral anterior cervical when he had cat scratch fever and nodes were necrotic      Parathyroid gland removal    PMH significant for HTN, GERD, anxiety, MDD, rosacea  He states he gets complete physical examination from his primary physician          Current Outpatient Medications:     ALPRAZolam (XANAX) 0 25 mg tablet, Take 1 tablet (0 25 mg total) by mouth 4 (four) times a day, Disp: 120 tablet, Rfl: 5    Ascorbic Acid (VITAMIN C) 100 MG CHEW, Chew 1 tablet daily, Disp: , Rfl:     atenolol (TENORMIN) 100 mg tablet, Take 1 tablet (100 mg total) by mouth 2 (two) times a day, Disp: 180 tablet, Rfl: 0    calcium citrate-vitamin d (CALCIUM CITRATE CHEWY BITE) 500-500 MG-UNIT CHEW chewable tablet, Chew, Disp: , Rfl:     Cholecalciferol (VITAMIN D3) 1000 units CAPS, Take by mouth, Disp: , Rfl:     doxycycline (DORYX) 150 MG EC tablet, Take 150 mg by mouth daily, Disp: , Rfl: 6    econazole nitrate 1 % cream, Apply topically daily, Disp: , Rfl:     escitalopram (LEXAPRO) 10 mg tablet, Take 1 tablet (10 mg total) by mouth daily for 180 days, Disp: 90 tablet, Rfl: 1    famotidine (PEPCID) 40 MG tablet, Take 1 tablet (40 mg total) by mouth daily, Disp: 90 tablet, Rfl: 1    hydrocortisone 2 5 % cream, Apply topically, Disp: , Rfl:   ILEVRO 0 3 % SUSP, INSTILL 1 DROP INTO RIGHT EYE EVERY DAY STARTING DAY OF SURGERY FOR 28 DAYS, Disp: , Rfl: 1    ketoconazole (NIZORAL) 2 % cream, APPLY TO SKIN TWICE DAILY AS DIRECTED, Disp: , Rfl: 2    LORazepam (ATIVAN) 1 mg tablet, Take 1 tablet (1 mg total) by mouth 3 (three) times a day, Disp: 90 tablet, Rfl: 5    losartan-hydrochlorothiazide (HYZAAR) 100-25 MG per tablet, Take 1 tablet by mouth daily, Disp: 90 tablet, Rfl: 1    Magnesium 250 MG TABS, Take by mouth, Disp: , Rfl:     metroNIDAZOLE (METROGEL) 1 % gel, Apply topically, Disp: , Rfl:     Multiple Vitamin (MULTIVITAMINS PO), Take 1 tablet by mouth daily, Disp: , Rfl:     potassium chloride (KLOR-CON 10) 10 mEq tablet, Take 1 tablet (10 mEq total) by mouth daily for 180 days, Disp: 90 tablet, Rfl: 1    Allergies   Allergen Reactions    Morphine         No history exists  ROS:  07/30/19 Reviewed 13 systems:  Presently no headaches, seizures, dizziness, diplopia, dysphagia, hoarseness, chest pain, palpitations, shortness of breath, cough, hemoptysis, abdominal pain, nausea, vomiting, change in bowel habits, melena, hematuria, fever, chills, bleeding, bone pains, skin rash, weight loss, arthritic symptoms, tiredness ,  weakness, numbness,  claudication and gait problem  No frequent infections  Not unusually sensitive to heat or cold  No swelling of the ankles  No swollen glands  Patient is not anxious   Other symptoms are in HPI        /88 (BP Location: Left arm, Patient Position: Sitting, Cuff Size: Adult)   Pulse 65   Temp 97 6 °F (36 4 °C)   Resp 20   Ht 5' 5 5" (1 664 m)   Wt 97 3 kg (214 lb 8 oz)   SpO2 97%   BMI 35 15 kg/m²     Physical Exam:  Alert, oriented, not in distress, no icterus, no oral thrush, no palpable neck mass, clear lung fields, regular heart rate, abdomen  soft and non tender, no palpable abdominal mass, no ascites, no edema of ankles, no calf tenderness, no focal neurological deficit except for hearing impairment, no skin rash, no palpable lymphadenopathy, good arterial pulses, no clubbing  Patient is anxious  Performance status 0  IMAGING:  IMPRESSION:        1  Mild hepatic steatosis  2   Mild splenomegaly  3   Cholelithiasis without cholecystitis    4   Bilateral lower pole renal cysts                  Workstation performed: [id number]      Imaging     US abdomen complete (Order: [id number]) - 1/3/2019   Result History         LABS:  Results for orders placed or performed in visit on 12/11/18   LD,Blood   Result Value Ref Range     120 - 250 U/L   Comprehensive metabolic panel   Result Value Ref Range    Glucose, Random 92 65 - 139 mg/dL    BUN 28 (H) 7 - 25 mg/dL    Creatinine 0 92 0 70 - 1 18 mg/dL    eGFR Non  84 > OR = 60 mL/min/1 73m2    eGFR  97 > OR = 60 mL/min/1 73m2    SL AMB BUN/CREATININE RATIO 30 (H) 6 - 22 (calc)    Sodium 142 135 - 146 mmol/L    Potassium 3 4 (L) 3 5 - 5 3 mmol/L    Chloride 103 98 - 110 mmol/L    CO2 27 20 - 32 mmol/L    SL AMB CALCIUM 9 3 8 6 - 10 3 mg/dL    Protein, Total 6 2 6 1 - 8 1 g/dL    Albumin 4 3 3 6 - 5 1 g/dL    Globulin 1 9 1 9 - 3 7 g/dL (calc)    Albumin/Globulin Ratio 2 3 1 0 - 2 5 (calc)    TOTAL BILIRUBIN 1 1 0 2 - 1 2 mg/dL    Alkaline Phosphatase 68 40 - 115 U/L    AST 27 10 - 35 U/L    ALT 27 9 - 46 U/L   JAK2 V617F CASCADING REFLEX TO CALR, JAK2 EXON 12, MPL, AND CSF3R   Result Value Ref Range    SL AMB CLINICAL INFORMATION see note     SPECIMEN SOURCE Blood     Block/Sample Number see note     JAK2 V617F MUTATION Not Detected Not detected    CALR MUTATION (EXON 9) Not Detected Not detected    JAK2 Exon 12 Mutation Not Detected Not detected    MPL EXON 10 MUTATION Not Detected Not detected    CSF3R EXON 14/17 MUTATION Not Detected Not detected    Interpretation see note     ASSAY DETAILS see note    CBC and differential   Result Value Ref Range    White Blood Cell Count 6 1 3 8 - 10 8 Thousand/uL    Red Blood Cell Count 5 09 4  20 - 5 80 Million/uL    Hemoglobin 17 0 13 2 - 17 1 g/dL    HCT 47 9 38 5 - 50 0 %    MCV 94 1 80 0 - 100 0 fL    MCH 33 4 (H) 27 0 - 33 0 pg    MCHC 35 5 32 0 - 36 0 g/dL    RDW 12 3 11 0 - 15 0 %    Platelet Count 92 (L) 140 - 400 Thousand/uL    SL AMB MPV 12 5 7 5 - 12 5 fL    Neutrophils (Absolute) 4,020 1,500 - 7,800 cells/uL    Lymphocytes (Absolute) 1,214 850 - 3,900 cells/uL    Monocytes (Absolute) 695 200 - 950 cells/uL    Eosinophils (Absolute) 153 15 - 500 cells/uL    Basophils ABS 18 0 - 200 cells/uL    Neutrophils 65 9 %    Lymphocytes 19 9 %    Monocytes 11 4 %    Eosinophils 2 5 %    Basophils PCT 0 3 %   Erythropoietin   Result Value Ref Range    Erythropoietin 17 9 2 6 - 18 5 mIU/mL   Carboxyhemoglobin   Result Value Ref Range    Carboxyhemoglobin (B) 4 <=12 %TOTAL HGB     Labs, Imaging, & Other studies:   All pertinent labs and imaging studies were personally reviewed    Lab Results   Component Value Date     06/10/2017    K 3 4 (L) 12/11/2018     12/11/2018    CO2 27 12/11/2018    ANIONGAP 6 04/16/2013    BUN 28 (H) 12/11/2018    CREATININE 0 92 12/11/2018    CALCIUM 9 3 12/11/2018    AST 27 12/11/2018    ALT 27 12/11/2018    ALKPHOS 68 12/11/2018    PROT 6 3 12/23/2016    BILITOT 1 2 12/23/2016     Lab Results   Component Value Date    WBC 6 1 12/11/2018    HGB 17 0 12/11/2018    HCT 47 9 12/11/2018    MCV 94 1 12/11/2018    PLT 92 (L) 12/11/2018       Reviewed and discussed with patient  Assessment and plan:    Patient is here with his wife  He is being followed for erythrocytosis and thrombocytopenia, chronic hematological problems  Negative JAK2 mutation test   Upper normal erythropoietin level  Normal oxygen saturation test   He had ultrasound of the abdomen that showed mild steatosis of the liver and top normal spleen size 11 9, cholelithiasis and bilateral lower pole renal cysts  See report below  He quit smoking cigarettes in 1990  No alcohol  Patient does not offer any symptoms  He is wearing hearing aids    Jen Graham never took testerone  In 2003 he saw hematology for similar reasons and had a work up and then was told he did not need to      Lymph nodes removed in 1992 in bilateral anterior cervical when he had cat scratch fever and nodes were necrotic      Parathyroid gland removal    PMH significant for HTN, GERD, anxiety, MDD, rosacea  He states he gets complete physical examination from his primary physician  Sammie Palomino Physical examination and test results or as recorded and discussed  He went for blood tests this morning  Those results are pending  Patient will be notified if there is a significant abnormality     Discussed various causes of erythrocytosis and also discussed causes of thrombocytopenia  In his case we do not have a definite answer so far  Patient will come back in 6 months with blood tests and chest x-ray  See orders below  Discussed the importance of eating healthy foods that is not rich in iron, staying active and health screening tests  Patient is capable of self-care  1  Thrombocytopenia (HCC)    - CBC and differential; Future  - Comprehensive metabolic panel; Future  - Sedimentation rate, automated; Future  - LD,Blood; Future  - BARBARA Screen w/ Reflex to Titer/Pattern; Future      2  Erythrocytosis    - CBC and differential; Future  - Comprehensive metabolic panel; Future  - LD,Blood; Future  - XR chest pa & lateral; Future      Patient voiced understanding and agreement in the discussion  Counseling / Coordination of Care:  30 minutes   Greater than 50% of total time was spent with the patient and / or family counseling and / or coordination of care

## 2019-07-31 LAB
BASOPHILS # BLD AUTO: 41 CELLS/UL (ref 0–200)
BASOPHILS NFR BLD AUTO: 0.7 %
EOSINOPHIL # BLD AUTO: 110 CELLS/UL (ref 15–500)
EOSINOPHIL NFR BLD AUTO: 1.9 %
ERYTHROCYTE [DISTWIDTH] IN BLOOD BY AUTOMATED COUNT: 12.3 % (ref 11–15)
HCT VFR BLD AUTO: 47.2 % (ref 38.5–50)
HGB BLD-MCNC: 16.2 G/DL (ref 13.2–17.1)
LYMPHOCYTES # BLD AUTO: 1189 CELLS/UL (ref 850–3900)
LYMPHOCYTES NFR BLD AUTO: 20.5 %
MCH RBC QN AUTO: 33.5 PG (ref 27–33)
MCHC RBC AUTO-ENTMCNC: 34.3 G/DL (ref 32–36)
MCV RBC AUTO: 97.7 FL (ref 80–100)
MONOCYTES # BLD AUTO: 644 CELLS/UL (ref 200–950)
MONOCYTES NFR BLD AUTO: 11.1 %
NEUTROPHILS # BLD AUTO: 3816 CELLS/UL (ref 1500–7800)
NEUTROPHILS NFR BLD AUTO: 65.8 %
PLATELET # BLD AUTO: 118 THOUSAND/UL (ref 140–400)
PMV BLD REES-ECKER: 11.5 FL (ref 7.5–12.5)
RBC # BLD AUTO: 4.83 MILLION/UL (ref 4.2–5.8)
SERVICE CMNT-IMP: ABNORMAL
WBC # BLD AUTO: 5.8 THOUSAND/UL (ref 3.8–10.8)

## 2019-10-09 ENCOUNTER — OFFICE VISIT (OUTPATIENT)
Dept: FAMILY MEDICINE CLINIC | Facility: CLINIC | Age: 69
End: 2019-10-09
Payer: COMMERCIAL

## 2019-10-09 VITALS
DIASTOLIC BLOOD PRESSURE: 76 MMHG | HEIGHT: 66 IN | SYSTOLIC BLOOD PRESSURE: 124 MMHG | OXYGEN SATURATION: 98 % | WEIGHT: 214.8 LBS | BODY MASS INDEX: 34.52 KG/M2 | HEART RATE: 72 BPM | TEMPERATURE: 98.8 F

## 2019-10-09 DIAGNOSIS — F32.0 CURRENT MILD EPISODE OF MAJOR DEPRESSIVE DISORDER WITHOUT PRIOR EPISODE (HCC): ICD-10-CM

## 2019-10-09 DIAGNOSIS — I10 ESSENTIAL HYPERTENSION: ICD-10-CM

## 2019-10-09 DIAGNOSIS — Z12.5 PROSTATE CANCER SCREENING: ICD-10-CM

## 2019-10-09 DIAGNOSIS — E55.9 VITAMIN D DEFICIENCY: Primary | ICD-10-CM

## 2019-10-09 DIAGNOSIS — F41.9 ANXIETY: ICD-10-CM

## 2019-10-09 DIAGNOSIS — E83.52 HYPERCALCEMIA: ICD-10-CM

## 2019-10-09 PROCEDURE — 3078F DIAST BP <80 MM HG: CPT | Performed by: FAMILY MEDICINE

## 2019-10-09 PROCEDURE — 3008F BODY MASS INDEX DOCD: CPT | Performed by: FAMILY MEDICINE

## 2019-10-09 PROCEDURE — 99215 OFFICE O/P EST HI 40 MIN: CPT | Performed by: FAMILY MEDICINE

## 2019-10-09 PROCEDURE — 93000 ELECTROCARDIOGRAM COMPLETE: CPT | Performed by: FAMILY MEDICINE

## 2019-10-09 PROCEDURE — 3074F SYST BP LT 130 MM HG: CPT | Performed by: FAMILY MEDICINE

## 2019-10-09 RX ORDER — LORAZEPAM 1 MG/1
1 TABLET ORAL 3 TIMES DAILY
Qty: 90 TABLET | Refills: 5 | Status: SHIPPED | OUTPATIENT
Start: 2019-10-09 | End: 2020-02-12 | Stop reason: SDUPTHER

## 2019-10-09 RX ORDER — ATENOLOL 100 MG/1
100 TABLET ORAL 2 TIMES DAILY
Qty: 180 TABLET | Refills: 3 | Status: SHIPPED | OUTPATIENT
Start: 2019-10-09 | End: 2020-10-13 | Stop reason: SDUPTHER

## 2019-10-09 RX ORDER — ALPRAZOLAM 0.25 MG/1
0.25 TABLET ORAL 4 TIMES DAILY
Qty: 120 TABLET | Refills: 5 | Status: SHIPPED | OUTPATIENT
Start: 2019-10-09 | End: 2020-02-12 | Stop reason: SDUPTHER

## 2019-10-09 RX ORDER — POTASSIUM CHLORIDE 750 MG/1
10 TABLET, FILM COATED, EXTENDED RELEASE ORAL DAILY
Qty: 90 TABLET | Refills: 3 | Status: SHIPPED | OUTPATIENT
Start: 2019-10-09 | End: 2020-10-13 | Stop reason: SDUPTHER

## 2019-10-09 RX ORDER — ESCITALOPRAM OXALATE 10 MG/1
10 TABLET ORAL DAILY
Qty: 90 TABLET | Refills: 3 | Status: SHIPPED | OUTPATIENT
Start: 2019-10-09 | End: 2020-10-13 | Stop reason: SDUPTHER

## 2019-10-09 RX ORDER — LOSARTAN POTASSIUM AND HYDROCHLOROTHIAZIDE 25; 100 MG/1; MG/1
1 TABLET ORAL DAILY
Qty: 90 TABLET | Refills: 3 | Status: SHIPPED | OUTPATIENT
Start: 2019-10-09 | End: 2020-02-12 | Stop reason: RX

## 2019-10-09 NOTE — PROGRESS NOTES
Chief Complaint   Patient presents with    Follow-up    Anxiety     Assessment/Plan:  Refill Med's  Discussed nutrition, weight loss and physical activity level luke during winter months  Diagnoses and all orders for this visit:    Vitamin D deficiency  -     Vitamin D 25 hydroxy; Future    Anxiety  -     ALPRAZolam (XANAX) 0 25 mg tablet; Take 1 tablet (0 25 mg total) by mouth 4 (four) times a day  -     LORazepam (ATIVAN) 1 mg tablet; Take 1 tablet (1 mg total) by mouth 3 (three) times a day    Essential hypertension  -     atenolol (TENORMIN) 100 mg tablet; Take 1 tablet (100 mg total) by mouth 2 (two) times a day  -     losartan-hydrochlorothiazide (HYZAAR) 100-25 MG per tablet; Take 1 tablet by mouth daily  -     POCT ECG  -     Lipid panel; Future    Current mild episode of major depressive disorder without prior episode (HCC)  -     escitalopram (LEXAPRO) 10 mg tablet; Take 1 tablet (10 mg total) by mouth daily    Hypercalcemia  -     potassium chloride (KLOR-CON 10) 10 mEq tablet; Take 1 tablet (10 mEq total) by mouth daily    Prostate cancer screening  -     PSA, Total Screen; Future          Subjective:      Patient ID: Beverly Fofana  is a 68 y o  male  Follow up  Reviewed latest labs  Following with Hematology for elevated H and H  Anxiety and sleeping controlled  BP has been stable  PSH: Parathyroidectomy  Patient states not overloading with Vitamin D replacement  The following portions of the patient's history were reviewed and updated as appropriate: allergies, current medications, past medical history, past social history, past surgical history and problem list   I have spent 40 minutes with Patient  today in which greater than 50% of this time was spent in counseling/coordination of care regarding Diagnostic results, Risks and benefits of tx options, Intructions for management, Importance of tx compliance, Risk factor reductions and Impressions        Review of Systems Constitutional: Negative  HENT: Negative  Eyes: Negative  Respiratory: Negative  Cardiovascular: Negative  Gastrointestinal: Negative  Genitourinary: Negative  Musculoskeletal: Negative  Skin: Negative  Neurological: Negative  Psychiatric/Behavioral: Negative  Objective: There were no vitals taken for this visit  Physical Exam   Constitutional: He is oriented to person, place, and time  He appears well-developed and well-nourished  HENT:   Head: Normocephalic and atraumatic  Right Ear: External ear normal    Left Ear: External ear normal    Nose: Nose normal    Mouth/Throat: Oropharynx is clear and moist    Eyes: Pupils are equal, round, and reactive to light  Conjunctivae and EOM are normal    Neck: Normal range of motion  Neck supple  Cardiovascular: Normal rate, regular rhythm, normal heart sounds and intact distal pulses  Pulmonary/Chest: Effort normal and breath sounds normal    Abdominal: Soft  Bowel sounds are normal    Neurological: He is alert and oriented to person, place, and time  He has normal reflexes  Skin: Skin is warm and dry  Psychiatric: He has a normal mood and affect   His behavior is normal  Judgment and thought content normal

## 2019-10-19 DIAGNOSIS — F41.9 ANXIETY: ICD-10-CM

## 2019-10-22 RX ORDER — ALPRAZOLAM 0.25 MG/1
TABLET ORAL
Qty: 120 TABLET | Refills: 5 | OUTPATIENT
Start: 2019-10-22

## 2019-10-22 RX ORDER — LORAZEPAM 1 MG/1
TABLET ORAL
Qty: 90 TABLET | Refills: 5 | OUTPATIENT
Start: 2019-10-22

## 2019-11-06 ENCOUNTER — TELEPHONE (OUTPATIENT)
Dept: FAMILY MEDICINE CLINIC | Facility: CLINIC | Age: 69
End: 2019-11-06

## 2019-11-06 DIAGNOSIS — Z12.11 SCREENING FOR COLON CANCER: Primary | ICD-10-CM

## 2019-11-12 ENCOUNTER — TELEPHONE (OUTPATIENT)
Dept: FAMILY MEDICINE CLINIC | Facility: CLINIC | Age: 69
End: 2019-11-12

## 2019-11-12 DIAGNOSIS — I10 ESSENTIAL HYPERTENSION: Primary | ICD-10-CM

## 2019-11-12 RX ORDER — LOSARTAN POTASSIUM 100 MG/1
100 TABLET ORAL DAILY
Qty: 90 TABLET | Refills: 1 | Status: SHIPPED | OUTPATIENT
Start: 2019-11-12 | End: 2020-02-12 | Stop reason: SDUPTHER

## 2019-11-12 RX ORDER — HYDROCHLOROTHIAZIDE 25 MG/1
25 TABLET ORAL DAILY
Qty: 90 TABLET | Refills: 1 | Status: SHIPPED | OUTPATIENT
Start: 2019-11-12 | End: 2020-02-12 | Stop reason: SDUPTHER

## 2019-11-12 NOTE — TELEPHONE ENCOUNTER
Pharmacist states combination medication for Losartan-hctz 100-25 mg is on back order for all strengths  Medications can be sent separately or different medication can be sent

## 2020-01-21 ENCOUNTER — APPOINTMENT (OUTPATIENT)
Dept: RADIOLOGY | Age: 70
End: 2020-01-21
Payer: COMMERCIAL

## 2020-01-21 DIAGNOSIS — D69.6 THROMBOCYTOPENIA (HCC): ICD-10-CM

## 2020-01-21 DIAGNOSIS — D75.1 ERYTHROCYTOSIS: ICD-10-CM

## 2020-01-21 PROCEDURE — 71046 X-RAY EXAM CHEST 2 VIEWS: CPT

## 2020-01-22 LAB
25(OH)D3 SERPL-MCNC: 35 NG/ML (ref 30–100)
CHOLEST SERPL-MCNC: 175 MG/DL
CHOLEST/HDLC SERPL: 4.6 (CALC)
HDLC SERPL-MCNC: 38 MG/DL
NONHDLC SERPL-MCNC: 137 MG/DL (CALC)
TRIGL SERPL-MCNC: 425 MG/DL

## 2020-01-23 LAB
ALBUMIN SERPL-MCNC: 4.5 G/DL (ref 3.6–5.1)
ALBUMIN/GLOB SERPL: 2 (CALC) (ref 1–2.5)
ALP SERPL-CCNC: 56 U/L (ref 40–115)
ALT SERPL-CCNC: 34 U/L (ref 9–46)
ANA SER QL IF: NEGATIVE
AST SERPL-CCNC: 26 U/L (ref 10–35)
BASOPHILS # BLD AUTO: 32 CELLS/UL (ref 0–200)
BASOPHILS NFR BLD AUTO: 0.7 %
BILIRUB SERPL-MCNC: 1.6 MG/DL (ref 0.2–1.2)
BUN SERPL-MCNC: 27 MG/DL (ref 7–25)
BUN/CREAT SERPL: 30 (CALC) (ref 6–22)
CALCIUM SERPL-MCNC: 9.8 MG/DL (ref 8.6–10.3)
CHLORIDE SERPL-SCNC: 104 MMOL/L (ref 98–110)
CO2 SERPL-SCNC: 28 MMOL/L (ref 20–32)
CREAT SERPL-MCNC: 0.89 MG/DL (ref 0.7–1.18)
EOSINOPHIL # BLD AUTO: 170 CELLS/UL (ref 15–500)
EOSINOPHIL NFR BLD AUTO: 3.7 %
ERYTHROCYTE [DISTWIDTH] IN BLOOD BY AUTOMATED COUNT: 12.2 % (ref 11–15)
ERYTHROCYTE [SEDIMENTATION RATE] IN BLOOD BY WESTERGREN METHOD: 2 MM/H
GLOBULIN SER CALC-MCNC: 2.2 G/DL (CALC) (ref 1.9–3.7)
GLUCOSE SERPL-MCNC: 96 MG/DL (ref 65–99)
HCT VFR BLD AUTO: 52 % (ref 38.5–50)
HGB BLD-MCNC: 17.7 G/DL (ref 13.2–17.1)
LDH SERPL-CCNC: 151 U/L (ref 120–250)
LYMPHOCYTES # BLD AUTO: 1159 CELLS/UL (ref 850–3900)
LYMPHOCYTES NFR BLD AUTO: 25.2 %
MCH RBC QN AUTO: 32.4 PG (ref 27–33)
MCHC RBC AUTO-ENTMCNC: 34 G/DL (ref 32–36)
MCV RBC AUTO: 95.1 FL (ref 80–100)
MONOCYTES # BLD AUTO: 534 CELLS/UL (ref 200–950)
MONOCYTES NFR BLD AUTO: 11.6 %
NEUTROPHILS # BLD AUTO: 2705 CELLS/UL (ref 1500–7800)
NEUTROPHILS NFR BLD AUTO: 58.8 %
PLATELET # BLD AUTO: 85 THOUSAND/UL (ref 140–400)
PMV BLD REES-ECKER: 12.1 FL (ref 7.5–12.5)
POTASSIUM SERPL-SCNC: 3.8 MMOL/L (ref 3.5–5.3)
PROT SERPL-MCNC: 6.7 G/DL (ref 6.1–8.1)
RBC # BLD AUTO: 5.47 MILLION/UL (ref 4.2–5.8)
SL AMB EGFR AFRICAN AMERICAN: 100 ML/MIN/1.73M2
SL AMB EGFR NON AFRICAN AMERICAN: 86 ML/MIN/1.73M2
SODIUM SERPL-SCNC: 142 MMOL/L (ref 135–146)
WBC # BLD AUTO: 4.6 THOUSAND/UL (ref 3.8–10.8)

## 2020-02-04 ENCOUNTER — OFFICE VISIT (OUTPATIENT)
Dept: HEMATOLOGY ONCOLOGY | Facility: CLINIC | Age: 70
End: 2020-02-04
Payer: COMMERCIAL

## 2020-02-04 VITALS
WEIGHT: 223 LBS | SYSTOLIC BLOOD PRESSURE: 126 MMHG | RESPIRATION RATE: 16 BRPM | DIASTOLIC BLOOD PRESSURE: 72 MMHG | OXYGEN SATURATION: 95 % | TEMPERATURE: 98.1 F | HEIGHT: 66 IN | HEART RATE: 85 BPM | BODY MASS INDEX: 35.84 KG/M2

## 2020-02-04 DIAGNOSIS — D75.1 ERYTHROCYTOSIS: ICD-10-CM

## 2020-02-04 DIAGNOSIS — D69.6 THROMBOCYTOPENIA (HCC): Primary | ICD-10-CM

## 2020-02-04 DIAGNOSIS — I10 ESSENTIAL HYPERTENSION: ICD-10-CM

## 2020-02-04 PROCEDURE — 99214 OFFICE O/P EST MOD 30 MIN: CPT | Performed by: INTERNAL MEDICINE

## 2020-02-04 PROCEDURE — 3078F DIAST BP <80 MM HG: CPT | Performed by: INTERNAL MEDICINE

## 2020-02-04 PROCEDURE — 1036F TOBACCO NON-USER: CPT | Performed by: INTERNAL MEDICINE

## 2020-02-04 PROCEDURE — 1160F RVW MEDS BY RX/DR IN RCRD: CPT | Performed by: INTERNAL MEDICINE

## 2020-02-04 PROCEDURE — 4040F PNEUMOC VAC/ADMIN/RCVD: CPT | Performed by: INTERNAL MEDICINE

## 2020-02-04 PROCEDURE — 3008F BODY MASS INDEX DOCD: CPT | Performed by: INTERNAL MEDICINE

## 2020-02-04 PROCEDURE — 3074F SYST BP LT 130 MM HG: CPT | Performed by: INTERNAL MEDICINE

## 2020-02-04 NOTE — PROGRESS NOTES
HPI:  Follow-up visit for thrombocytopenia and it is quite possible patient might have mild chronic adult ITP? Luis Miguel Velazquez Patient has  explained erythrocytosis that was present in 2018 but then hematocrit normalized and now hematocrit is high again  JAK2 mutation test was negative  Erythropoietin level was upper normal and spleen size was upper normal   Patient's condition and counts are being monitored  He has some tiredness  He is wearing hearing aids  Patient quit smoking cigarettes in 1990  Patient states that he saw a hematologist in 2003 with  same problem and he was told not to worry about it      Lymph nodes were removed in 2001 [facility] in bilateral anterior cervical when he had cat scratch fever and nodes were necrotic      History of Parathyroid gland removal    PMH significant for HTN, GERD, anxiety, MDD, rosacea  He states he gets complete physical examination from his primary physician                Current Outpatient Medications:     ALPRAZolam (XANAX) 0 25 mg tablet, Take 1 tablet (0 25 mg total) by mouth 4 (four) times a day, Disp: 120 tablet, Rfl: 5    Ascorbic Acid (VITAMIN C) 100 MG CHEW, Chew 1 tablet daily, Disp: , Rfl:     atenolol (TENORMIN) 100 mg tablet, Take 1 tablet (100 mg total) by mouth 2 (two) times a day, Disp: 180 tablet, Rfl: 3    calcium citrate-vitamin d (CALCIUM CITRATE CHEWY BITE) 500-500 MG-UNIT CHEW chewable tablet, Chew, Disp: , Rfl:     Cholecalciferol (VITAMIN D3) 1000 units CAPS, Take by mouth, Disp: , Rfl:     doxycycline (DORYX) 150 MG EC tablet, Take 150 mg by mouth daily, Disp: , Rfl: 6    econazole nitrate 1 % cream, Apply topically daily, Disp: , Rfl:     escitalopram (LEXAPRO) 10 mg tablet, Take 1 tablet (10 mg total) by mouth daily, Disp: 90 tablet, Rfl: 3    famotidine (PEPCID) 40 MG tablet, Take 1 tablet (40 mg total) by mouth daily, Disp: 90 tablet, Rfl: 1    hydrochlorothiazide (HYDRODIURIL) 25 mg tablet, Take 1 tablet (25 mg total) by mouth daily, Disp: 90 tablet, Rfl: 1    hydrocortisone 2 5 % cream, Apply topically, Disp: , Rfl:     ILEVRO 0 3 % SUSP, INSTILL 1 DROP INTO RIGHT EYE EVERY DAY STARTING DAY OF SURGERY FOR 28 DAYS, Disp: , Rfl: 1    ketoconazole (NIZORAL) 2 % cream, APPLY TO SKIN TWICE DAILY AS DIRECTED, Disp: , Rfl: 2    LORazepam (ATIVAN) 1 mg tablet, Take 1 tablet (1 mg total) by mouth 3 (three) times a day, Disp: 90 tablet, Rfl: 5    losartan (COZAAR) 100 MG tablet, Take 1 tablet (100 mg total) by mouth daily, Disp: 90 tablet, Rfl: 1    losartan-hydrochlorothiazide (HYZAAR) 100-25 MG per tablet, Take 1 tablet by mouth daily, Disp: 90 tablet, Rfl: 3    Magnesium 250 MG TABS, Take by mouth, Disp: , Rfl:     metroNIDAZOLE (METROGEL) 1 % gel, Apply topically, Disp: , Rfl:     Multiple Vitamin (MULTIVITAMINS PO), Take 1 tablet by mouth daily, Disp: , Rfl:     potassium chloride (KLOR-CON 10) 10 mEq tablet, Take 1 tablet (10 mEq total) by mouth daily, Disp: 90 tablet, Rfl: 3    Allergies   Allergen Reactions    Morphine         No history exists  ROS:  02/04/20 Reviewed 13 systems:  Presently no other neurological, cardiac, pulmonary, GI and  symptoms other than mentioned above  No other symptoms like fever, chills, bleeding, bone pains,  weight loss, arthritic symptoms,   weakness, numbness,  claudication and gait problem  No frequent infections  Not unusually sensitive to heat or cold  No swelling of the ankles  No swollen glands  Patient is anxious   Other symptoms are in HPI        /72 (BP Location: Left arm, Patient Position: Sitting, Cuff Size: Adult)   Pulse 85   Temp 98 1 °F (36 7 °C)   Resp 16   Ht 5' 5 5" (1 664 m)   Wt 101 kg (223 lb)   SpO2 95%   BMI 36 54 kg/m²     Physical Exam:  Alert, oriented, not in distress, no icterus, no oral thrush, no palpable neck mass, clear lung fields, regular heart rate, abdomen  soft and non tender, no palpable abdominal mass, no ascites, no edema of ankles, no calf tenderness, no focal neurological deficit other than hearing impairment, no skin rash, no palpable lymphadenopathy, good arterial pulses, no clubbing  Patient is anxious  Performance status 1  IMAGING:  IMPRESSION:     No acute cardiopulmonary disease            Workstation performed: [id number]      Imaging     XR chest pa & lateral (Order: [id number]) - 1/21/2020  IMPRESSION:        1  Mild hepatic steatosis  2   Mild splenomegaly  3   Cholelithiasis without cholecystitis    4   Bilateral lower pole renal cysts                  Workstation performed: [id number]      Imaging     US abdomen complete (Order: [id number]) - 1/3/2019   Result History         LABS:  Results for orders placed or performed in visit on 01/21/20   LD,Blood   Result Value Ref Range     120 - 250 U/L   Comprehensive metabolic panel   Result Value Ref Range    Glucose, Random 96 65 - 99 mg/dL    BUN 27 (H) 7 - 25 mg/dL    Creatinine 0 89 0 70 - 1 18 mg/dL    eGFR Non  86 > OR = 60 mL/min/1 73m2    eGFR  100 > OR = 60 mL/min/1 73m2    SL AMB BUN/CREATININE RATIO 30 (H) 6 - 22 (calc)    Sodium 142 135 - 146 mmol/L    Potassium 3 8 3 5 - 5 3 mmol/L    Chloride 104 98 - 110 mmol/L    CO2 28 20 - 32 mmol/L    SL AMB CALCIUM 9 8 8 6 - 10 3 mg/dL    Protein, Total 6 7 6 1 - 8 1 g/dL    Albumin 4 5 3 6 - 5 1 g/dL    Globulin 2 2 1 9 - 3 7 g/dL (calc)    Albumin/Globulin Ratio 2 0 1 0 - 2 5 (calc)    TOTAL BILIRUBIN 1 6 (H) 0 2 - 1 2 mg/dL    Alkaline Phosphatase 56 40 - 115 U/L    AST 26 10 - 35 U/L    ALT 34 9 - 46 U/L   Sedimentation rate, automated   Result Value Ref Range    Sedimentation Rate 2 < OR = 20 mm/h   CBC and differential   Result Value Ref Range    White Blood Cell Count 4 6 3 8 - 10 8 Thousand/uL    Red Blood Cell Count 5 47 4 20 - 5 80 Million/uL    Hemoglobin 17 7 (H) 13 2 - 17 1 g/dL    HCT 52 0 (H) 38 5 - 50 0 %    MCV 95 1 80 0 - 100 0 fL    MCH 32 4 27 0 - 33 0 pg    MCHC 34 0 32 0 - 36 0 g/dL RDW 12 2 11 0 - 15 0 %    Platelet Count 85 (L) 140 - 400 Thousand/uL    SL AMB MPV 12 1 7 5 - 12 5 fL    Neutrophils (Absolute) 2,705 1,500 - 7,800 cells/uL    Lymphocytes (Absolute) 1,159 850 - 3,900 cells/uL    Monocytes (Absolute) 534 200 - 950 cells/uL    Eosinophils (Absolute) 170 15 - 500 cells/uL    Basophils ABS 32 0 - 200 cells/uL    Neutrophils 58 8 %    Lymphocytes 25 2 %    Monocytes 11 6 %    Eosinophils 3 7 %    Basophils PCT 0 7 %   BARBARA Screen w/ Reflex to Titer/Pattern   Result Value Ref Range    BARBARA Screen, IFA NEGATIVE NEGATIVE     Labs, Imaging, & Other studies:   All pertinent labs and imaging studies were personally reviewed    Lab Results   Component Value Date     06/10/2017    K 3 8 01/21/2020     01/21/2020    CO2 28 01/21/2020    ANIONGAP 6 04/16/2013    BUN 27 (H) 01/21/2020    CREATININE 0 89 01/21/2020    CALCIUM 9 8 01/21/2020    AST 26 01/21/2020    ALT 34 01/21/2020    ALKPHOS 56 01/21/2020    PROT 6 3 12/23/2016    BILITOT 1 2 12/23/2016     Lab Results   Component Value Date    WBC 4 6 01/21/2020    HGB 17 7 (H) 01/21/2020    HCT 52 0 (H) 01/21/2020    MCV 95 1 01/21/2020    PLT 85 (L) 01/21/2020       Assessment and plan: Follow-up visit for thrombocytopenia and it is quite possible patient might have mild chronic adult ITP? Maria Elena Garcia Patient has  explained erythrocytosis that was present in 2018 but then hematocrit normalized and now hematocrit is high again  JAK2 mutation test was negative  Erythropoietin level was upper normal and spleen size was upper normal   Patient's condition and counts are being monitored  He has some tiredness  He is wearing hearing aids  Patient quit smoking cigarettes in 1990    Patient states that he saw a hematologist in 2003 with  same problem and he was told not to worry about it      [place] [facility] anterior cervical when he had cat scratch fever and nodes were necrotic      History of Parathyroid gland removal    PMH significant for HTN, GERD, anxiety, MDD, rosacea  He states he gets complete physical examination from his primary physician  Jose Thacker Physical examination and test results or as recorded and discussed     Discussed various causes of erythrocytosis and also discussed causes of thrombocytopenia  In his case we do not have a definite answer so far  Patient will come back in 3 months with CBC D and if problem persisted he will have additional  tests including bone marrow test   Explained bone marrow procedure to the patient  Discussed the importance of eating healthy foods that is not rich in iron, staying active and health screening tests  Patient is capable of self-care  1  Thrombocytopenia (HCC)    - CBC and differential; Future    2  Erythrocytosis    - CBC and differential; Future    3  Essential hypertension              Patient voiced understanding and agreement in the discussion  Counseling / Coordination of Care   Greater than 50% of total time was spent with the patient and / or family counseling and / or coordination of care

## 2020-02-12 ENCOUNTER — OFFICE VISIT (OUTPATIENT)
Dept: FAMILY MEDICINE CLINIC | Facility: CLINIC | Age: 70
End: 2020-02-12
Payer: COMMERCIAL

## 2020-02-12 VITALS
HEIGHT: 66 IN | WEIGHT: 224.8 LBS | HEART RATE: 74 BPM | TEMPERATURE: 97.8 F | BODY MASS INDEX: 36.13 KG/M2 | OXYGEN SATURATION: 98 % | SYSTOLIC BLOOD PRESSURE: 132 MMHG | RESPIRATION RATE: 16 BRPM | DIASTOLIC BLOOD PRESSURE: 80 MMHG

## 2020-02-12 DIAGNOSIS — D75.1 ERYTHROCYTOSIS: ICD-10-CM

## 2020-02-12 DIAGNOSIS — F32.0 CURRENT MILD EPISODE OF MAJOR DEPRESSIVE DISORDER WITHOUT PRIOR EPISODE (HCC): ICD-10-CM

## 2020-02-12 DIAGNOSIS — F41.9 ANXIETY: ICD-10-CM

## 2020-02-12 DIAGNOSIS — Z11.59 NEED FOR HEPATITIS C SCREENING TEST: Primary | ICD-10-CM

## 2020-02-12 DIAGNOSIS — I10 ESSENTIAL HYPERTENSION: ICD-10-CM

## 2020-02-12 DIAGNOSIS — F33.1 MODERATE EPISODE OF RECURRENT MAJOR DEPRESSIVE DISORDER (HCC): ICD-10-CM

## 2020-02-12 DIAGNOSIS — R12 HEART BURN: ICD-10-CM

## 2020-02-12 PROCEDURE — 3288F FALL RISK ASSESSMENT DOCD: CPT | Performed by: FAMILY MEDICINE

## 2020-02-12 PROCEDURE — 1101F PT FALLS ASSESS-DOCD LE1/YR: CPT | Performed by: FAMILY MEDICINE

## 2020-02-12 PROCEDURE — 99214 OFFICE O/P EST MOD 30 MIN: CPT | Performed by: FAMILY MEDICINE

## 2020-02-12 PROCEDURE — 1036F TOBACCO NON-USER: CPT | Performed by: FAMILY MEDICINE

## 2020-02-12 PROCEDURE — G0439 PPPS, SUBSEQ VISIT: HCPCS | Performed by: FAMILY MEDICINE

## 2020-02-12 PROCEDURE — 3079F DIAST BP 80-89 MM HG: CPT | Performed by: FAMILY MEDICINE

## 2020-02-12 PROCEDURE — 1170F FXNL STATUS ASSESSED: CPT | Performed by: FAMILY MEDICINE

## 2020-02-12 PROCEDURE — 4040F PNEUMOC VAC/ADMIN/RCVD: CPT | Performed by: FAMILY MEDICINE

## 2020-02-12 PROCEDURE — 1160F RVW MEDS BY RX/DR IN RCRD: CPT | Performed by: FAMILY MEDICINE

## 2020-02-12 PROCEDURE — 1125F AMNT PAIN NOTED PAIN PRSNT: CPT | Performed by: FAMILY MEDICINE

## 2020-02-12 PROCEDURE — 3075F SYST BP GE 130 - 139MM HG: CPT | Performed by: FAMILY MEDICINE

## 2020-02-12 RX ORDER — ALPRAZOLAM 0.25 MG/1
0.25 TABLET ORAL 4 TIMES DAILY
Qty: 120 TABLET | Refills: 5 | Status: SHIPPED | OUTPATIENT
Start: 2020-02-12 | End: 2020-04-20

## 2020-02-12 RX ORDER — FAMOTIDINE 40 MG/1
40 TABLET, FILM COATED ORAL DAILY
Qty: 90 TABLET | Refills: 1 | Status: SHIPPED | OUTPATIENT
Start: 2020-02-12 | End: 2020-09-02

## 2020-02-12 RX ORDER — LOSARTAN POTASSIUM 100 MG/1
100 TABLET ORAL DAILY
Qty: 90 TABLET | Refills: 1 | Status: SHIPPED | OUTPATIENT
Start: 2020-02-12 | End: 2020-10-13 | Stop reason: SDUPTHER

## 2020-02-12 RX ORDER — LORAZEPAM 1 MG/1
1 TABLET ORAL 3 TIMES DAILY
Qty: 90 TABLET | Refills: 5 | Status: SHIPPED | OUTPATIENT
Start: 2020-02-12 | End: 2020-04-20 | Stop reason: SDUPTHER

## 2020-02-12 RX ORDER — HYDROCHLOROTHIAZIDE 25 MG/1
25 TABLET ORAL DAILY
Qty: 90 TABLET | Refills: 1 | Status: SHIPPED | OUTPATIENT
Start: 2020-02-12 | End: 2020-10-13 | Stop reason: SDUPTHER

## 2020-02-12 NOTE — PROGRESS NOTES
Assessment and Plan:     Problem List Items Addressed This Visit     None           Preventive health issues were discussed with patient, and age appropriate screening tests were ordered as noted in patient's After Visit Summary  Personalized health advice and appropriate referrals for health education or preventive services given if needed, as noted in patient's After Visit Summary  History of Present Illness:     Patient presents for Welcome to Medicare visit  Patient Care Team:  Vivien Gilford, DO as PCP - [address] [place] [address]      Review of Systems:     Review of Systems   Constitutional: Negative  HENT: Negative  Eyes: Negative  Respiratory: Negative  Cardiovascular: Negative  Gastrointestinal: Negative  Genitourinary: Negative  Musculoskeletal: Negative  Skin: Negative  Neurological: Negative  Psychiatric/Behavioral: Negative         Problem List:     Patient Active Problem List   Diagnosis    Essential hypertension    Anxiety    Recurrent major depressive disorder (HCC)    Thrombocytopenia (HCC)    Erythrocytosis      Past Medical and Surgical History:     Past Medical History:   Diagnosis Date    Cat-scratch disease     Cellulitis     Erysipelas     last assessed-10/14/2013    Parathyroid adenoma     Rosacea     Thrombocytopenia ([address] )     last assessed-1/16/2017    Tick bites     last assessed-10/14/2013     Past Surgical History:   Procedure Laterality Date    APPENDECTOMY      1998    LYMPHADENECTOMY      PARATHYROIDECTOMY      TOOTH EXTRACTION        Family History:     Family History   Problem Relation Age of Onset    Aortic aneurysm Mother     Hypertension Mother     Heart attack Father         acute MI    Breast cancer Paternal Grandmother     Breast cancer Paternal Aunt     Diabetes Paternal Uncle       Social History:        Social History     Socioeconomic History    Marital status: /Civil Union     Spouse name: None    Number of children: None    Years of education: None    Highest education level: None   Occupational History    None   Social Needs    Financial resource strain: None    Food insecurity:     Worry: None     Inability: None    Transportation needs:     Medical: None     Non-medical: None   Tobacco Use    Smoking status: Former Smoker    Smokeless tobacco: Never Used   Substance and Sexual Activity    Alcohol use: No    Drug use: None    Sexual activity: None   Lifestyle    Physical activity:     Days per week: None     Minutes per session: None    Stress: None   Relationships    Social connections:     Talks on phone: None     Gets together: None     Attends [employer] service: None     Active member of club or organization: None     Attends meetings of clubs or organizations: None     Relationship status: None    Intimate partner violence:     Fear of current or ex partner: None     Emotionally abused: None     Physically abused: None     Forced sexual activity: None   Other Topics Concern    None   Social History Narrative    Advance directive in chart      Medications and Allergies:     Current Outpatient Medications   Medication Sig Dispense Refill    ALPRAZolam (XANAX) 0 25 mg tablet Take 1 tablet (0 25 mg total) by mouth 4 (four) times a day 120 tablet 5    Ascorbic Acid (VITAMIN C) 100 MG CHEW Chew 1 tablet daily      atenolol (TENORMIN) 100 mg tablet Take 1 tablet (100 mg total) by mouth 2 (two) times a day 180 tablet 3    calcium citrate-vitamin d (CALCIUM CITRATE CHEWY BITE) 500-500 MG-UNIT CHEW chewable tablet Chew      Cholecalciferol (VITAMIN D3) 1000 units CAPS Take by mouth      doxycycline (DORYX) 150 MG EC tablet Take 150 mg by mouth daily  6    econazole nitrate 1 % cream Apply topically daily      escitalopram (LEXAPRO) 10 mg tablet Take 1 tablet (10 mg total) by mouth daily 90 tablet 3    famotidine (PEPCID) 40 MG tablet Take 1 tablet (40 mg total) by mouth daily 90 tablet 1    hydrochlorothiazide (HYDRODIURIL) 25 mg tablet Take 1 tablet (25 mg total) by mouth daily 90 tablet 1    hydrocortisone 2 5 % cream Apply topically      ILEVRO 0 3 % SUSP INSTILL 1 DROP INTO RIGHT EYE EVERY DAY STARTING DAY OF SURGERY FOR 28 DAYS  1    ketoconazole (NIZORAL) 2 % cream APPLY TO SKIN TWICE DAILY AS DIRECTED  2    LORazepam (ATIVAN) 1 mg tablet Take 1 tablet (1 mg total) by mouth 3 (three) times a day 90 tablet 5    losartan (COZAAR) 100 MG tablet Take 1 tablet (100 mg total) by mouth daily 90 tablet 1    losartan-hydrochlorothiazide (HYZAAR) 100-25 MG per tablet Take 1 tablet by mouth daily 90 tablet 3    Magnesium 250 MG TABS Take by mouth      metroNIDAZOLE (METROGEL) 1 % gel Apply topically      Multiple Vitamin (MULTIVITAMINS PO) Take 1 tablet by mouth daily      potassium chloride (KLOR-CON 10) 10 mEq tablet Take 1 tablet (10 mEq total) by mouth daily 90 tablet 3     No current facility-administered medications for this visit        Allergies   Allergen Reactions    Morphine Hallucinations      Immunizations:     Immunization History   Administered Date(s) Administered    H1N1, All Formulations 01/09/2010    INFLUENZA 10/22/2007, 10/17/2008, 10/16/2009, 10/22/2010, 09/16/2016, 09/30/2018    Influenza Quadrivalent, 6-35 Months IM 09/16/2016    Influenza Split High Dose Preservative Free IM 10/26/2015, 10/09/2016, 09/30/2018, 09/24/2019    Pneumococcal Polysaccharide PPV23 10/22/2007, 12/23/2013, 09/30/2018    TD (adult) Preservative Free 08/14/2018    Td (adult), adsorbed 06/09/2008, 08/14/2018    Zoster 12/01/2009, 08/03/2017      Health Maintenance:         Topic Date Due    Hepatitis C Screening  1950    CRC Screening: Colonoscopy  07/01/2019         Topic Date Due    DTaP,Tdap,and Td Vaccines (1 - Tdap) 05[birth year]20/1959    Pneumococcal Vaccine: 65+ Years (2 of 2 - PCV13) 09/30/2019      Medicare Screening Tests and Risk Assessments:     Killian Hernandez is here for his Subsequent Wellness visit  Health Risk Assessment:   Patient rates overall health as very good  Patient feels that their physical health rating is same  Eyesight was rated as Same  Hearing was rated as Same  Patient feels that their emotional and mental health rating is same  Pain experienced in the last 7 days has been None  Patient states that he has experienced no weight loss or gain in last 6 months  Fall Risk Screening: In the past year, patient has experienced: no history of falling in past year      Home Safety:  Patient does not have trouble with stairs inside or outside of their home  Patient has no working smoke alarms and has working carbon monoxide detector  Home safety hazards include: household clutter  Nutrition:   Current diet is Low Carb  Medications:   Patient is not currently taking any over-the-counter supplements  Patient is able to manage medications  Activities of Daily Living (ADLs)/Instrumental Activities of Daily Living (IADLs):   Walk and transfer into and out of bed and chair?: Yes  Dress and groom yourself?: Yes    Bathe or shower yourself?: Yes    Feed yourself? Yes  Do your laundry/housekeeping?: Yes  Manage your money, pay your bills and track your expenses?: Yes  Make your own meals?: Yes    Do your own shopping?: Yes    Previous Hospitalizations:   Any hospitalizations or ED visits within the last 12 months?: No      Advance Care Planning:   Living will: Yes    Durable POA for healthcare:  Yes    Advanced directive: Yes      Cognitive Screening:   Provider or family/friend/caregiver concerned regarding cognition?: No    PREVENTIVE SCREENINGS      Cardiovascular Screening:    General: Screening Current      Diabetes Screening:     General: Screening Current      Colorectal Cancer Screening:     General: Screening Current      Prostate Cancer Screening:    General: Screening Current      Osteoporosis Screening:    General: Screening Not Indicated      Abdominal Aortic Aneurysm (AAA) Screening:    Risk factors include: age between 73-67 yo and tobacco use        General: Screening Not Indicated      Lung Cancer Screening:     General: Screening Not Indicated      Hepatitis C Screening:    General: Screening Not Indicated    No exam data present     Physical Exam:     /80 (BP Location: Left arm, Patient Position: Sitting, Cuff Size: Standard)   Pulse 74   Temp 97 8 °F (36 6 °C) (Oral)   Resp 16   Ht 5' 5 5" (1 664 m)   Wt 102 kg (224 lb 12 8 oz)   SpO2 98%   BMI 36 84 kg/m²     Physical Exam   Constitutional: He is oriented to person, place, and time  He appears well-developed and well-nourished  HENT:   Head: Normocephalic and atraumatic  Right Ear: External ear normal    Left Ear: External ear normal    Nose: Nose normal    Mouth/Throat: Oropharynx is clear and moist    Eyes: Pupils are equal, round, and reactive to light  Conjunctivae and EOM are normal    Neck: Normal range of motion  Neck supple  Cardiovascular: Normal rate, regular rhythm, normal heart sounds and intact distal pulses  Pulmonary/Chest: Effort normal and breath sounds normal    Abdominal: Soft  Bowel sounds are normal    Neurological: He is alert and oriented to person, place, and time  He has normal reflexes  Skin: Skin is warm and dry  Psychiatric: He has a normal mood and affect   His behavior is normal  Judgment and thought content normal        Chacha Abreu DO

## 2020-02-12 NOTE — PROGRESS NOTES
Chief Complaint   Patient presents with    Follow-up    Medicare Wellness Visit     subsequent       Assessment/Plan:  Refill Med's, weight reduction, need more physical activity  Fall Risk Assesment      Most Recent Value   Fall Risk   One or more falls in the last year? No   How many times? --   Feels unsteady when standing or walking? No   Worried about falling? No           Diagnoses and all orders for this visit:    Need for hepatitis C screening test  -     Hepatitis C antibody; Future    Current mild episode of major depressive disorder without prior episode (HCC)    Anxiety  -     ALPRAZolam (XANAX) 0 25 mg tablet; Take 1 tablet (0 25 mg total) by mouth 4 (four) times a day  -     LORazepam (ATIVAN) 1 mg tablet; Take 1 tablet (1 mg total) by mouth 3 (three) times a day    Heart burn  -     famotidine (PEPCID) 40 MG tablet; Take 1 tablet (40 mg total) by mouth daily    Essential hypertension  -     losartan (COZAAR) 100 MG tablet; Take 1 tablet (100 mg total) by mouth daily  -     hydrochlorothiazide (HYDRODIURIL) 25 mg tablet; Take 1 tablet (25 mg total) by mouth daily    Erythrocytosis    Moderate episode of recurrent major depressive disorder (HCC)          Subjective:      Patient ID: Vivian Hawkins  is a 68 y o  male  Refills  BP, HTN, reflux, anxiety/depression controlled  Seen by Hematology for elevated H and H  The following portions of the patient's history were reviewed and updated as appropriate: allergies, current medications, past family history, past medical history, past social history and problem list     Review of Systems   Constitutional: Negative  HENT: Negative  Eyes: Negative  Respiratory: Negative  Cardiovascular: Negative  Gastrointestinal: Negative  Genitourinary: Negative  Musculoskeletal: Negative  Skin: Negative  Neurological: Negative  Psychiatric/Behavioral: Negative            Objective:      /80 (BP Location: Left arm, Patient Position: Sitting, Cuff Size: Standard)   Pulse 74   Temp 97 8 °F (36 6 °C) (Oral)   Resp 16   Ht 5' 5 5" (1 664 m)   Wt 102 kg (224 lb 12 8 oz)   SpO2 98%   BMI 36 84 kg/m²       Current Outpatient Medications:     ALPRAZolam (XANAX) 0 25 mg tablet, Take 1 tablet (0 25 mg total) by mouth 4 (four) times a day, Disp: 120 tablet, Rfl: 5    Ascorbic Acid (VITAMIN C) 100 MG CHEW, Chew 1 tablet daily, Disp: , Rfl:     atenolol (TENORMIN) 100 mg tablet, Take 1 tablet (100 mg total) by mouth 2 (two) times a day, Disp: 180 tablet, Rfl: 3    calcium citrate-vitamin d (CALCIUM CITRATE CHEWY BITE) 500-500 MG-UNIT CHEW chewable tablet, Chew, Disp: , Rfl:     Cholecalciferol (VITAMIN D3) 1000 units CAPS, Take by mouth, Disp: , Rfl:     doxycycline (DORYX) 150 MG EC tablet, Take 150 mg by mouth daily, Disp: , Rfl: 6    econazole nitrate 1 % cream, Apply topically daily, Disp: , Rfl:     escitalopram (LEXAPRO) 10 mg tablet, Take 1 tablet (10 mg total) by mouth daily, Disp: 90 tablet, Rfl: 3    famotidine (PEPCID) 40 MG tablet, Take 1 tablet (40 mg total) by mouth daily, Disp: 90 tablet, Rfl: 1    hydrochlorothiazide (HYDRODIURIL) 25 mg tablet, Take 1 tablet (25 mg total) by mouth daily, Disp: 90 tablet, Rfl: 1    hydrocortisone 2 5 % cream, Apply topically, Disp: , Rfl:     ILEVRO 0 3 % SUSP, INSTILL 1 DROP INTO RIGHT EYE EVERY DAY STARTING DAY OF SURGERY FOR 28 DAYS, Disp: , Rfl: 1    ketoconazole (NIZORAL) 2 % cream, APPLY TO SKIN TWICE DAILY AS DIRECTED, Disp: , Rfl: 2    LORazepam (ATIVAN) 1 mg tablet, Take 1 tablet (1 mg total) by mouth 3 (three) times a day, Disp: 90 tablet, Rfl: 5    losartan (COZAAR) 100 MG tablet, Take 1 tablet (100 mg total) by mouth daily, Disp: 90 tablet, Rfl: 1    losartan-hydrochlorothiazide (HYZAAR) 100-25 MG per tablet, Take 1 tablet by mouth daily, Disp: 90 tablet, Rfl: 3    Magnesium 250 MG TABS, Take by mouth, Disp: , Rfl:     metroNIDAZOLE (METROGEL) 1 % gel, Apply topically, Disp: , Rfl:     Multiple Vitamin (MULTIVITAMINS PO), Take 1 tablet by mouth daily, Disp: , Rfl:     potassium chloride (KLOR-CON 10) 10 mEq tablet, Take 1 tablet (10 mEq total) by mouth daily, Disp: 90 tablet, Rfl: 3     Physical Exam   Constitutional: He is oriented to person, place, and time  He appears well-developed and well-nourished  HENT:   Head: Normocephalic and atraumatic  Right Ear: External ear normal    Left Ear: External ear normal    Nose: Nose normal    Mouth/Throat: Oropharynx is clear and moist    Eyes: Pupils are equal, round, and reactive to light  Conjunctivae and EOM are normal    Neck: Normal range of motion  Neck supple  Cardiovascular: Normal rate, regular rhythm, normal heart sounds and intact distal pulses  Pulmonary/Chest: Effort normal and breath sounds normal    Abdominal: Soft  Bowel sounds are normal    Genitourinary: Prostate normal    Neurological: He is alert and oriented to person, place, and time  He has normal reflexes  Skin: Skin is warm and dry  Psychiatric: He has a normal mood and affect   His behavior is normal  Judgment and thought content normal

## 2020-04-20 DIAGNOSIS — F41.9 ANXIETY: ICD-10-CM

## 2020-04-20 RX ORDER — ALPRAZOLAM 0.25 MG/1
0.25 TABLET ORAL 4 TIMES DAILY
Qty: 120 TABLET | Refills: 5 | Status: SHIPPED | OUTPATIENT
Start: 2020-04-20 | End: 2020-10-13 | Stop reason: SDUPTHER

## 2020-04-20 RX ORDER — LORAZEPAM 1 MG/1
1 TABLET ORAL 3 TIMES DAILY
Qty: 90 TABLET | Refills: 5 | Status: SHIPPED | OUTPATIENT
Start: 2020-04-20 | End: 2020-10-13 | Stop reason: SDUPTHER

## 2020-04-23 LAB
BASOPHILS # BLD AUTO: 20 CELLS/UL (ref 0–200)
BASOPHILS NFR BLD AUTO: 0.3 %
EOSINOPHIL # BLD AUTO: 211 CELLS/UL (ref 15–500)
EOSINOPHIL NFR BLD AUTO: 3.2 %
ERYTHROCYTE [DISTWIDTH] IN BLOOD BY AUTOMATED COUNT: 12.7 % (ref 11–15)
HCT VFR BLD AUTO: 50.6 % (ref 38.5–50)
HCV AB S/CO SERPL IA: 0.02
HCV AB SERPL QL IA: NORMAL
HGB BLD-MCNC: 17.5 G/DL (ref 13.2–17.1)
LYMPHOCYTES # BLD AUTO: 1452 CELLS/UL (ref 850–3900)
LYMPHOCYTES NFR BLD AUTO: 22 %
MCH RBC QN AUTO: 32.9 PG (ref 27–33)
MCHC RBC AUTO-ENTMCNC: 34.6 G/DL (ref 32–36)
MCV RBC AUTO: 95.1 FL (ref 80–100)
MONOCYTES # BLD AUTO: 726 CELLS/UL (ref 200–950)
MONOCYTES NFR BLD AUTO: 11 %
NEUTROPHILS # BLD AUTO: 4191 CELLS/UL (ref 1500–7800)
NEUTROPHILS NFR BLD AUTO: 63.5 %
PLATELET # BLD AUTO: 101 THOUSAND/UL (ref 140–400)
PMV BLD REES-ECKER: 12.4 FL (ref 7.5–12.5)
RBC # BLD AUTO: 5.32 MILLION/UL (ref 4.2–5.8)
WBC # BLD AUTO: 6.6 THOUSAND/UL (ref 3.8–10.8)

## 2020-05-01 ENCOUNTER — OFFICE VISIT (OUTPATIENT)
Dept: HEMATOLOGY ONCOLOGY | Facility: CLINIC | Age: 70
End: 2020-05-01
Payer: COMMERCIAL

## 2020-05-01 VITALS
OXYGEN SATURATION: 98 % | BODY MASS INDEX: 36.16 KG/M2 | HEIGHT: 66 IN | TEMPERATURE: 97.9 F | SYSTOLIC BLOOD PRESSURE: 126 MMHG | HEART RATE: 73 BPM | WEIGHT: 225 LBS | RESPIRATION RATE: 16 BRPM | DIASTOLIC BLOOD PRESSURE: 76 MMHG

## 2020-05-01 DIAGNOSIS — I10 ESSENTIAL HYPERTENSION: ICD-10-CM

## 2020-05-01 DIAGNOSIS — D75.1 ERYTHROCYTOSIS: ICD-10-CM

## 2020-05-01 DIAGNOSIS — D69.6 THROMBOCYTOPENIA (HCC): Primary | ICD-10-CM

## 2020-05-01 PROCEDURE — 3074F SYST BP LT 130 MM HG: CPT | Performed by: INTERNAL MEDICINE

## 2020-05-01 PROCEDURE — 1036F TOBACCO NON-USER: CPT | Performed by: INTERNAL MEDICINE

## 2020-05-01 PROCEDURE — 4040F PNEUMOC VAC/ADMIN/RCVD: CPT | Performed by: INTERNAL MEDICINE

## 2020-05-01 PROCEDURE — 1160F RVW MEDS BY RX/DR IN RCRD: CPT | Performed by: INTERNAL MEDICINE

## 2020-05-01 PROCEDURE — 3078F DIAST BP <80 MM HG: CPT | Performed by: INTERNAL MEDICINE

## 2020-05-01 PROCEDURE — 99214 OFFICE O/P EST MOD 30 MIN: CPT | Performed by: INTERNAL MEDICINE

## 2020-05-01 PROCEDURE — 3008F BODY MASS INDEX DOCD: CPT | Performed by: INTERNAL MEDICINE

## 2020-06-17 ENCOUNTER — OFFICE VISIT (OUTPATIENT)
Dept: FAMILY MEDICINE CLINIC | Facility: CLINIC | Age: 70
End: 2020-06-17
Payer: COMMERCIAL

## 2020-06-17 VITALS
HEART RATE: 89 BPM | RESPIRATION RATE: 15 BRPM | BODY MASS INDEX: 36.42 KG/M2 | HEIGHT: 66 IN | DIASTOLIC BLOOD PRESSURE: 90 MMHG | SYSTOLIC BLOOD PRESSURE: 146 MMHG | OXYGEN SATURATION: 97 % | WEIGHT: 226.6 LBS | TEMPERATURE: 97.4 F

## 2020-06-17 DIAGNOSIS — F33.1 MODERATE EPISODE OF RECURRENT MAJOR DEPRESSIVE DISORDER (HCC): ICD-10-CM

## 2020-06-17 DIAGNOSIS — D69.6 THROMBOCYTOPENIA (HCC): ICD-10-CM

## 2020-06-17 DIAGNOSIS — D75.1 ERYTHROCYTOSIS: ICD-10-CM

## 2020-06-17 DIAGNOSIS — I10 ESSENTIAL HYPERTENSION: Primary | ICD-10-CM

## 2020-06-17 DIAGNOSIS — F41.9 ANXIETY: ICD-10-CM

## 2020-06-17 PROCEDURE — 3077F SYST BP >= 140 MM HG: CPT | Performed by: FAMILY MEDICINE

## 2020-06-17 PROCEDURE — 1160F RVW MEDS BY RX/DR IN RCRD: CPT | Performed by: FAMILY MEDICINE

## 2020-06-17 PROCEDURE — 1036F TOBACCO NON-USER: CPT | Performed by: FAMILY MEDICINE

## 2020-06-17 PROCEDURE — 3008F BODY MASS INDEX DOCD: CPT | Performed by: FAMILY MEDICINE

## 2020-06-17 PROCEDURE — 4040F PNEUMOC VAC/ADMIN/RCVD: CPT | Performed by: FAMILY MEDICINE

## 2020-06-17 PROCEDURE — 99214 OFFICE O/P EST MOD 30 MIN: CPT | Performed by: FAMILY MEDICINE

## 2020-06-17 PROCEDURE — 3080F DIAST BP >= 90 MM HG: CPT | Performed by: FAMILY MEDICINE

## 2020-08-07 ENCOUNTER — TELEPHONE (OUTPATIENT)
Dept: HEMATOLOGY ONCOLOGY | Facility: CLINIC | Age: 70
End: 2020-08-07

## 2020-08-07 NOTE — TELEPHONE ENCOUNTER
Left a message for the patient explaining that his appointment with Dr Lon Gonzalez was moved to 9/25  An appointment change letter was sent out and I asked the patient to call if he could not make the new date

## 2020-08-17 DIAGNOSIS — F41.9 ANXIETY: ICD-10-CM

## 2020-08-17 DIAGNOSIS — F32.0 CURRENT MILD EPISODE OF MAJOR DEPRESSIVE DISORDER WITHOUT PRIOR EPISODE (HCC): ICD-10-CM

## 2020-08-19 RX ORDER — ALPRAZOLAM 0.25 MG/1
0.25 TABLET ORAL 4 TIMES DAILY
Qty: 120 TABLET | OUTPATIENT
Start: 2020-08-19 | End: 2021-02-15

## 2020-08-19 RX ORDER — ESCITALOPRAM OXALATE 10 MG/1
TABLET ORAL
Qty: 90 TABLET | Refills: 1 | OUTPATIENT
Start: 2020-08-19

## 2020-08-19 RX ORDER — LORAZEPAM 1 MG/1
1 TABLET ORAL 3 TIMES DAILY
Qty: 90 TABLET | OUTPATIENT
Start: 2020-08-19 | End: 2021-02-15

## 2020-09-02 DIAGNOSIS — R12 HEART BURN: ICD-10-CM

## 2020-09-02 RX ORDER — FAMOTIDINE 40 MG/1
TABLET, FILM COATED ORAL
Qty: 90 TABLET | Refills: 1 | Status: SHIPPED | OUTPATIENT
Start: 2020-09-02 | End: 2021-02-15 | Stop reason: SDUPTHER

## 2020-10-13 ENCOUNTER — OFFICE VISIT (OUTPATIENT)
Dept: FAMILY MEDICINE CLINIC | Facility: CLINIC | Age: 70
End: 2020-10-13
Payer: COMMERCIAL

## 2020-10-13 VITALS
RESPIRATION RATE: 16 BRPM | OXYGEN SATURATION: 96 % | DIASTOLIC BLOOD PRESSURE: 90 MMHG | TEMPERATURE: 97.9 F | HEART RATE: 80 BPM | HEIGHT: 65 IN | WEIGHT: 228.6 LBS | SYSTOLIC BLOOD PRESSURE: 148 MMHG | BODY MASS INDEX: 38.09 KG/M2

## 2020-10-13 DIAGNOSIS — F41.9 ANXIETY: ICD-10-CM

## 2020-10-13 DIAGNOSIS — E55.9 VITAMIN D DEFICIENCY: ICD-10-CM

## 2020-10-13 DIAGNOSIS — E83.52 HYPERCALCEMIA: ICD-10-CM

## 2020-10-13 DIAGNOSIS — F32.0 CURRENT MILD EPISODE OF MAJOR DEPRESSIVE DISORDER WITHOUT PRIOR EPISODE (HCC): ICD-10-CM

## 2020-10-13 DIAGNOSIS — I10 ESSENTIAL HYPERTENSION: ICD-10-CM

## 2020-10-13 DIAGNOSIS — Z12.5 PROSTATE CANCER SCREENING: Primary | ICD-10-CM

## 2020-10-13 PROCEDURE — 3080F DIAST BP >= 90 MM HG: CPT | Performed by: FAMILY MEDICINE

## 2020-10-13 PROCEDURE — 1036F TOBACCO NON-USER: CPT | Performed by: FAMILY MEDICINE

## 2020-10-13 PROCEDURE — 1160F RVW MEDS BY RX/DR IN RCRD: CPT | Performed by: FAMILY MEDICINE

## 2020-10-13 PROCEDURE — 3077F SYST BP >= 140 MM HG: CPT | Performed by: FAMILY MEDICINE

## 2020-10-13 PROCEDURE — 99215 OFFICE O/P EST HI 40 MIN: CPT | Performed by: FAMILY MEDICINE

## 2020-10-13 RX ORDER — HYDROCHLOROTHIAZIDE 25 MG/1
25 TABLET ORAL DAILY
Qty: 90 TABLET | Refills: 1 | Status: SHIPPED | OUTPATIENT
Start: 2020-10-13 | End: 2021-02-15 | Stop reason: SDUPTHER

## 2020-10-13 RX ORDER — LORAZEPAM 1 MG/1
1 TABLET ORAL 3 TIMES DAILY
Qty: 90 TABLET | Refills: 5 | Status: SHIPPED | OUTPATIENT
Start: 2020-10-13 | End: 2021-02-15 | Stop reason: SDUPTHER

## 2020-10-13 RX ORDER — ATENOLOL 100 MG/1
100 TABLET ORAL 2 TIMES DAILY
Qty: 180 TABLET | Refills: 3 | Status: SHIPPED | OUTPATIENT
Start: 2020-10-13 | End: 2021-07-20

## 2020-10-13 RX ORDER — LOSARTAN POTASSIUM 100 MG/1
100 TABLET ORAL DAILY
Qty: 90 TABLET | Refills: 1 | Status: SHIPPED | OUTPATIENT
Start: 2020-10-13 | End: 2021-02-15 | Stop reason: SDUPTHER

## 2020-10-13 RX ORDER — ESCITALOPRAM OXALATE 10 MG/1
10 TABLET ORAL DAILY
Qty: 90 TABLET | Refills: 3 | Status: SHIPPED | OUTPATIENT
Start: 2020-10-13 | End: 2021-09-29 | Stop reason: SDUPTHER

## 2020-10-13 RX ORDER — ALPRAZOLAM 0.25 MG/1
0.25 TABLET ORAL 4 TIMES DAILY
Qty: 120 TABLET | Refills: 5 | Status: SHIPPED | OUTPATIENT
Start: 2020-10-13 | End: 2021-02-15 | Stop reason: SDUPTHER

## 2020-10-13 RX ORDER — POTASSIUM CHLORIDE 750 MG/1
10 TABLET, FILM COATED, EXTENDED RELEASE ORAL DAILY
Qty: 90 TABLET | Refills: 3 | Status: SHIPPED | OUTPATIENT
Start: 2020-10-13 | End: 2021-02-15 | Stop reason: SDUPTHER

## 2020-10-16 LAB
ALBUMIN SERPL-MCNC: 4.2 G/DL (ref 3.6–5.1)
ALBUMIN/GLOB SERPL: 2 (CALC) (ref 1–2.5)
ALP SERPL-CCNC: 48 U/L (ref 35–144)
ALT SERPL-CCNC: 31 U/L (ref 9–46)
AST SERPL-CCNC: 26 U/L (ref 10–35)
BILIRUB SERPL-MCNC: 2 MG/DL (ref 0.2–1.2)
BUN SERPL-MCNC: 18 MG/DL (ref 7–25)
BUN/CREAT SERPL: ABNORMAL (CALC) (ref 6–22)
CALCIUM SERPL-MCNC: 9.7 MG/DL (ref 8.6–10.3)
CHLORIDE SERPL-SCNC: 100 MMOL/L (ref 98–110)
CO2 SERPL-SCNC: 34 MMOL/L (ref 20–32)
CREAT SERPL-MCNC: 1.01 MG/DL (ref 0.7–1.18)
GLOBULIN SER CALC-MCNC: 2.1 G/DL (CALC) (ref 1.9–3.7)
GLUCOSE SERPL-MCNC: 106 MG/DL (ref 65–139)
POTASSIUM SERPL-SCNC: 3.7 MMOL/L (ref 3.5–5.3)
PROT SERPL-MCNC: 6.3 G/DL (ref 6.1–8.1)
SL AMB EGFR AFRICAN AMERICAN: 86 ML/MIN/1.73M2
SL AMB EGFR NON AFRICAN AMERICAN: 74 ML/MIN/1.73M2
SODIUM SERPL-SCNC: 140 MMOL/L (ref 135–146)

## 2020-10-17 LAB
25(OH)D3 SERPL-MCNC: 28 NG/ML (ref 30–100)
CHOLEST SERPL-MCNC: 156 MG/DL
CHOLEST/HDLC SERPL: 4.7 (CALC)
HDLC SERPL-MCNC: 33 MG/DL
NONHDLC SERPL-MCNC: 123 MG/DL (CALC)
PSA SERPL-MCNC: 0.9 NG/ML
TRIGL SERPL-MCNC: 477 MG/DL

## 2020-10-30 ENCOUNTER — OFFICE VISIT (OUTPATIENT)
Dept: HEMATOLOGY ONCOLOGY | Facility: CLINIC | Age: 70
End: 2020-10-30
Payer: COMMERCIAL

## 2020-10-30 ENCOUNTER — TELEPHONE (OUTPATIENT)
Dept: FAMILY MEDICINE CLINIC | Facility: CLINIC | Age: 70
End: 2020-10-30

## 2020-10-30 VITALS
OXYGEN SATURATION: 95 % | HEIGHT: 65 IN | DIASTOLIC BLOOD PRESSURE: 72 MMHG | BODY MASS INDEX: 39.49 KG/M2 | HEART RATE: 76 BPM | SYSTOLIC BLOOD PRESSURE: 130 MMHG | WEIGHT: 237 LBS | RESPIRATION RATE: 18 BRPM | TEMPERATURE: 96.7 F

## 2020-10-30 DIAGNOSIS — R16.1 SPLENOMEGALY: ICD-10-CM

## 2020-10-30 DIAGNOSIS — D75.1 ERYTHROCYTOSIS: ICD-10-CM

## 2020-10-30 DIAGNOSIS — E78.5 HYPERLIPIDEMIA, UNSPECIFIED HYPERLIPIDEMIA TYPE: Primary | ICD-10-CM

## 2020-10-30 DIAGNOSIS — D69.6 THROMBOCYTOPENIA (HCC): Primary | ICD-10-CM

## 2020-10-30 DIAGNOSIS — E78.5 HYPERLIPIDEMIA, UNSPECIFIED HYPERLIPIDEMIA TYPE: ICD-10-CM

## 2020-10-30 DIAGNOSIS — I10 ESSENTIAL HYPERTENSION: ICD-10-CM

## 2020-10-30 DIAGNOSIS — E78.2 ELEVATED TRIGLYCERIDES WITH HIGH CHOLESTEROL: ICD-10-CM

## 2020-10-30 PROCEDURE — 3008F BODY MASS INDEX DOCD: CPT | Performed by: INTERNAL MEDICINE

## 2020-10-30 PROCEDURE — 3078F DIAST BP <80 MM HG: CPT | Performed by: INTERNAL MEDICINE

## 2020-10-30 PROCEDURE — 1160F RVW MEDS BY RX/DR IN RCRD: CPT | Performed by: INTERNAL MEDICINE

## 2020-10-30 PROCEDURE — 99213 OFFICE O/P EST LOW 20 MIN: CPT | Performed by: INTERNAL MEDICINE

## 2020-10-30 PROCEDURE — 3075F SYST BP GE 130 - 139MM HG: CPT | Performed by: INTERNAL MEDICINE

## 2020-12-13 DIAGNOSIS — R12 HEART BURN: ICD-10-CM

## 2020-12-14 RX ORDER — FAMOTIDINE 40 MG/1
TABLET, FILM COATED ORAL
Qty: 90 TABLET | Refills: 1 | OUTPATIENT
Start: 2020-12-14

## 2020-12-23 ENCOUNTER — VBI (OUTPATIENT)
Dept: ADMINISTRATIVE | Facility: OTHER | Age: 70
End: 2020-12-23

## 2021-01-07 DIAGNOSIS — R12 HEART BURN: ICD-10-CM

## 2021-01-08 RX ORDER — FAMOTIDINE 40 MG/1
TABLET, FILM COATED ORAL
Qty: 90 TABLET | Refills: 1 | OUTPATIENT
Start: 2021-01-08

## 2021-01-18 DIAGNOSIS — I10 ESSENTIAL HYPERTENSION: ICD-10-CM

## 2021-01-18 RX ORDER — HYDROCHLOROTHIAZIDE 25 MG/1
TABLET ORAL
Qty: 90 TABLET | Refills: 1 | OUTPATIENT
Start: 2021-01-18

## 2021-02-13 DIAGNOSIS — Z23 ENCOUNTER FOR IMMUNIZATION: ICD-10-CM

## 2021-02-15 ENCOUNTER — OFFICE VISIT (OUTPATIENT)
Dept: FAMILY MEDICINE CLINIC | Facility: CLINIC | Age: 71
End: 2021-02-15
Payer: COMMERCIAL

## 2021-02-15 VITALS
TEMPERATURE: 97.1 F | OXYGEN SATURATION: 98 % | DIASTOLIC BLOOD PRESSURE: 80 MMHG | SYSTOLIC BLOOD PRESSURE: 124 MMHG | WEIGHT: 231 LBS | BODY MASS INDEX: 38.49 KG/M2 | HEART RATE: 86 BPM | HEIGHT: 65 IN

## 2021-02-15 DIAGNOSIS — F41.9 ANXIETY: ICD-10-CM

## 2021-02-15 DIAGNOSIS — D75.1 ERYTHROCYTOSIS: ICD-10-CM

## 2021-02-15 DIAGNOSIS — F32.0 CURRENT MILD EPISODE OF MAJOR DEPRESSIVE DISORDER WITHOUT PRIOR EPISODE (HCC): ICD-10-CM

## 2021-02-15 DIAGNOSIS — R12 HEART BURN: ICD-10-CM

## 2021-02-15 DIAGNOSIS — Z00.00 MEDICARE ANNUAL WELLNESS VISIT, SUBSEQUENT: Primary | ICD-10-CM

## 2021-02-15 DIAGNOSIS — E83.52 HYPERCALCEMIA: ICD-10-CM

## 2021-02-15 DIAGNOSIS — I10 ESSENTIAL HYPERTENSION: ICD-10-CM

## 2021-02-15 DIAGNOSIS — D69.6 THROMBOCYTOPENIA (HCC): ICD-10-CM

## 2021-02-15 DIAGNOSIS — E66.01 OBESITY, MORBID (HCC): ICD-10-CM

## 2021-02-15 PROCEDURE — G0439 PPPS, SUBSEQ VISIT: HCPCS | Performed by: FAMILY MEDICINE

## 2021-02-15 PROCEDURE — 3288F FALL RISK ASSESSMENT DOCD: CPT | Performed by: FAMILY MEDICINE

## 2021-02-15 PROCEDURE — 3079F DIAST BP 80-89 MM HG: CPT | Performed by: FAMILY MEDICINE

## 2021-02-15 PROCEDURE — 3008F BODY MASS INDEX DOCD: CPT | Performed by: FAMILY MEDICINE

## 2021-02-15 PROCEDURE — 1101F PT FALLS ASSESS-DOCD LE1/YR: CPT | Performed by: FAMILY MEDICINE

## 2021-02-15 PROCEDURE — 1125F AMNT PAIN NOTED PAIN PRSNT: CPT | Performed by: FAMILY MEDICINE

## 2021-02-15 PROCEDURE — 3725F SCREEN DEPRESSION PERFORMED: CPT | Performed by: FAMILY MEDICINE

## 2021-02-15 PROCEDURE — 1036F TOBACCO NON-USER: CPT | Performed by: FAMILY MEDICINE

## 2021-02-15 PROCEDURE — 99214 OFFICE O/P EST MOD 30 MIN: CPT | Performed by: FAMILY MEDICINE

## 2021-02-15 PROCEDURE — 3074F SYST BP LT 130 MM HG: CPT | Performed by: FAMILY MEDICINE

## 2021-02-15 PROCEDURE — 1170F FXNL STATUS ASSESSED: CPT | Performed by: FAMILY MEDICINE

## 2021-02-15 PROCEDURE — 1160F RVW MEDS BY RX/DR IN RCRD: CPT | Performed by: FAMILY MEDICINE

## 2021-02-15 RX ORDER — LORAZEPAM 1 MG/1
1 TABLET ORAL 3 TIMES DAILY
Qty: 90 TABLET | Refills: 5 | Status: SHIPPED | OUTPATIENT
Start: 2021-02-15 | End: 2021-06-30 | Stop reason: SDUPTHER

## 2021-02-15 RX ORDER — POTASSIUM CHLORIDE 750 MG/1
10 TABLET, FILM COATED, EXTENDED RELEASE ORAL DAILY
Qty: 90 TABLET | Refills: 3 | Status: SHIPPED | OUTPATIENT
Start: 2021-02-15 | End: 2022-04-20 | Stop reason: SDUPTHER

## 2021-02-15 RX ORDER — ALPRAZOLAM 0.25 MG/1
0.25 TABLET ORAL 4 TIMES DAILY
Qty: 120 TABLET | Refills: 5 | Status: SHIPPED | OUTPATIENT
Start: 2021-02-15 | End: 2021-06-30 | Stop reason: SDUPTHER

## 2021-02-15 RX ORDER — FAMOTIDINE 40 MG/1
40 TABLET, FILM COATED ORAL DAILY
Qty: 90 TABLET | Refills: 1 | Status: SHIPPED | OUTPATIENT
Start: 2021-02-15 | End: 2021-06-21

## 2021-02-15 RX ORDER — LOSARTAN POTASSIUM 100 MG/1
100 TABLET ORAL DAILY
Qty: 90 TABLET | Refills: 1 | Status: SHIPPED | OUTPATIENT
Start: 2021-02-15 | End: 2021-07-20

## 2021-02-15 RX ORDER — HYDROCHLOROTHIAZIDE 25 MG/1
25 TABLET ORAL DAILY
Qty: 90 TABLET | Refills: 1 | Status: SHIPPED | OUTPATIENT
Start: 2021-02-15 | End: 2021-06-30 | Stop reason: SDUPTHER

## 2021-02-15 NOTE — PROGRESS NOTES
Assessment and Plan:     Problem List Items Addressed This Visit     Essential hypertension    Relevant Medications    losartan (COZAAR) 100 MG tablet    hydrochlorothiazide (HYDRODIURIL) 25 mg tablet    Anxiety    Relevant Medications    LORazepam (ATIVAN) 1 mg tablet    ALPRAZolam (XANAX) 0 25 mg tablet    Thrombocytopenia (HCC)    Erythrocytosis    Obesity, morbid (HCC)      Other Visit Diagnoses     Medicare annual wellness visit, subsequent    -  Primary    Hypercalcemia        Relevant Medications    potassium chloride (Klor-Con 10) 10 mEq tablet    Heart burn        Relevant Medications    famotidine (PEPCID) 40 MG tablet    Current mild episode of major depressive disorder without prior episode (HCC)        Relevant Medications    LORazepam (ATIVAN) 1 mg tablet    ALPRAZolam (XANAX) 0 25 mg tablet        BMI Counseling: Body mass index is 38 44 kg/m²  The BMI is above normal  Nutrition recommendations include decreasing portion sizes, moderation in carbohydrate intake and increasing intake of lean protein  Exercise recommendations include exercising 3-5 times per week  No pharmacotherapy was ordered  Patient referred to PCP due to patient being overweight  Preventive health issues were discussed with patient, and age appropriate screening tests were ordered as noted in patient's After Visit Summary  Personalized health advice and appropriate referrals for health education or preventive services given if needed, as noted in patient's After Visit Summary  History of Present Illness:     Patient presents for Welcome to Medicare visit  Patient Care Team:  Arturo Sims DO as PCP - [address] [place] [address] Street, DO     Review of Systems:     Review of Systems   Constitutional: Negative  HENT: Negative  Eyes: Negative  Respiratory: Negative  Cardiovascular: Negative  Gastrointestinal: Negative  Genitourinary: Negative  Musculoskeletal: Negative  Skin: Negative      Neurological: Negative  Psychiatric/Behavioral: Negative         Problem List:     Patient Active Problem List   Diagnosis    Essential hypertension    Anxiety    Recurrent major depressive disorder (HCC)    Thrombocytopenia (HCC)    Erythrocytosis    Splenomegaly    Hyperlipidemia    Obesity, morbid ([facility] [address] )      Past Medical and Surgical History:     Past Medical History:   Diagnosis Date    Cat-scratch disease     Cellulitis     Erysipelas     last assessed-10/14/2013    Parathyroid adenoma     Rosacea     Thrombocytopenia ([facility] [address] )     last assessed-1/16/2017    Tick bites     last assessed-10/14/2013     Past Surgical History:   Procedure Laterality Date    APPENDECTOMY      1998    LYMPHADENECTOMY      PARATHYROIDECTOMY      TOOTH EXTRACTION        Family History:     Family History   Problem Relation Age of Onset    Aortic aneurysm Mother     Hypertension Mother     Heart attack Father         acute MI    Breast cancer Paternal Grandmother     Breast cancer Paternal Aunt     Diabetes Paternal Uncle       Social History:        Social History     Socioeconomic History    Marital status: /Civil Union     Spouse name: None    Number of children: None    Years of education: None    Highest education level: None   Occupational History    None   Social Needs    Financial resource strain: None    Food insecurity     Worry: None     Inability: None    Transportation needs     Medical: None     Non-medical: None   Tobacco Use    Smoking status: Former Smoker    Smokeless tobacco: Never Used   Substance and Sexual Activity    Alcohol use: No    Drug use: None    Sexual activity: None   Lifestyle    Physical activity     Days per week: None     Minutes per session: None    Stress: None   Relationships    Social connections     Talks on phone: None     Gets together: None     Attends Yazidism service: None     Active member of club or organization: None     Attends meetings of clubs or organizations: None     Relationship status: None    Intimate partner violence     Fear of current or ex partner: None     Emotionally abused: None     Physically abused: None     Forced sexual activity: None   Other Topics Concern    None   Social History Narrative    Advance directive in chart      Medications and Allergies:     Current Outpatient Medications   Medication Sig Dispense Refill    ALPRAZolam (XANAX) 0 25 mg tablet Take 1 tablet (0 25 mg total) by mouth 4 (four) times a day 120 tablet 5    Ascorbic Acid (VITAMIN C) 100 MG CHEW Chew 1 tablet daily      atenolol (TENORMIN) 100 mg tablet Take 1 tablet (100 mg total) by mouth 2 (two) times a day 180 tablet 3    calcium citrate-vitamin d (CALCIUM CITRATE CHEWY BITE) 500-500 MG-UNIT CHEW chewable tablet Chew      Cholecalciferol (VITAMIN D3) 1000 units CAPS Take by mouth      doxycycline (DORYX) 150 MG EC tablet Take 150 mg by mouth daily  6    econazole nitrate 1 % cream Apply topically daily      escitalopram (LEXAPRO) 10 mg tablet Take 1 tablet (10 mg total) by mouth daily 90 tablet 3    famotidine (PEPCID) 40 MG tablet Take 1 tablet (40 mg total) by mouth daily 90 tablet 1    hydrochlorothiazide (HYDRODIURIL) 25 mg tablet Take 1 tablet (25 mg total) by mouth daily 90 tablet 1    hydrocortisone 2 5 % cream Apply topically      ILEVRO 0 3 % SUSP INSTILL 1 DROP INTO RIGHT EYE EVERY DAY STARTING DAY OF SURGERY FOR 28 DAYS  1    ketoconazole (NIZORAL) 2 % cream APPLY TO SKIN TWICE DAILY AS DIRECTED  2    LORazepam (ATIVAN) 1 mg tablet Take 1 tablet (1 mg total) by mouth 3 (three) times a day 90 tablet 5    losartan (COZAAR) 100 MG tablet Take 1 tablet (100 mg total) by mouth daily 90 tablet 1    Magnesium 250 MG TABS Take by mouth      metroNIDAZOLE (METROGEL) 1 % gel Apply topically      Multiple Vitamin (MULTIVITAMINS PO) Take 1 tablet by mouth daily      potassium chloride (Klor-Con 10) 10 mEq tablet Take 1 tablet (10 mEq total) by mouth daily 90 tablet 3     No current facility-administered medications for this visit  Allergies   Allergen Reactions    Morphine Hallucinations      Immunizations:     Immunization History   Administered Date(s) Administered    H1N1, All Formulations 01/09/2010    INFLUENZA 10/22/2007, 10/17/2008, 10/16/2009, 10/22/2010, 09/16/2016, 09/30/2018    Influenza Quadrivalent, 6-35 Months IM 09/16/2016    Influenza Split High Dose Preservative Free IM 10/26/2015, 10/09/2016, 09/30/2018, 09/24/2019    Pneumococcal Polysaccharide PPV23 10/22/2007, 12/23/2013, 09/30/2018    TD (adult) Preservative Free 08/14/2018    Td (adult), adsorbed 06/09/2008, 08/14/2018    Zoster 12/01/2009, 08/03/2017      Health Maintenance:         Topic Date Due    Colorectal Cancer Screening  07/01/2019    Hepatitis C Screening  Completed         Topic Date Due    DTaP,Tdap,and Td Vaccines (1 - Tdap) 05/20/1971      Medicare Screening Tests and Risk Assessments:     Sourav Franco is here for his Subsequent Wellness visit  Health Risk Assessment:   Patient rates overall health as very good  Patient feels that their physical health rating is same  Eyesight was rated as Same  Hearing was rated as Same  Patient feels that their emotional and mental health rating is same  Pain experienced in the last 7 days has been None  Patient states that he has experienced no weight loss or gain in last 6 months  Depression Screening:   PHQ-2 Score: 0  PHQ-9 Score: 0      Fall Risk Screening: In the past year, patient has experienced: no history of falling in past year      Home Safety:  Patient does not have trouble with stairs inside or outside of their home  Patient has no working smoke alarms and has working carbon monoxide detector  Home safety hazards include: household clutter  Nutrition:   Current diet is Low Carb  Medications:   Patient is not currently taking any over-the-counter supplements   Patient is able to manage medications  Activities of Daily Living (ADLs)/Instrumental Activities of Daily Living (IADLs):   Walk and transfer into and out of bed and chair?: Yes  Dress and groom yourself?: Yes    Bathe or shower yourself?: Yes    Feed yourself? Yes  Do your laundry/housekeeping?: Yes  Manage your money, pay your bills and track your expenses?: Yes  Make your own meals?: Yes    Do your own shopping?: Yes    Previous Hospitalizations:   Any hospitalizations or ED visits within the last 12 months?: No      Advance Care Planning:   Living will: Yes    Durable POA for healthcare: Yes    Advanced directive: Yes      Cognitive Screening:   Provider or family/friend/caregiver concerned regarding cognition?: No    PREVENTIVE SCREENINGS      Cardiovascular Screening:    General: Screening Not Indicated and History Lipid Disorder      Diabetes Screening:     General: Screening Current      Colorectal Cancer Screening:     General: Screening Current and Risks and Benefits Discussed      Prostate Cancer Screening:    General: Screening Current      Osteoporosis Screening:    General: Screening Not Indicated      Abdominal Aortic Aneurysm (AAA) Screening:    Risk factors include: age between 73-67 yo and tobacco use        General: Screening Not Indicated      Lung Cancer Screening:     General: Screening Not Indicated      Hepatitis C Screening:    General: Screening Current    No exam data present     Physical Exam:     /80 (BP Location: Left arm, Patient Position: Sitting, Cuff Size: Standard)   Pulse 86   Temp (!) 97 1 °F (36 2 °C) (Tympanic)   Ht 5' 5" (1 651 m)   Wt 105 kg (231 lb)   SpO2 98%   BMI 38 44 kg/m²     Physical Exam  Constitutional:       Appearance: Normal appearance  HENT:      Head: Normocephalic and atraumatic        Right Ear: Tympanic membrane, ear canal and external ear normal       Left Ear: Tympanic membrane, ear canal and external ear normal       Nose: Nose normal       Mouth/Throat: Pharynx: Oropharynx is clear  Eyes:      Extraocular Movements: Extraocular movements intact  Conjunctiva/sclera: Conjunctivae normal       Pupils: Pupils are equal, round, and reactive to light  Cardiovascular:      Rate and Rhythm: Normal rate and regular rhythm  Pulmonary:      Effort: Pulmonary effort is normal       Breath sounds: Normal breath sounds  Abdominal:      General: Abdomen is flat  Skin:     General: Skin is warm and dry  Neurological:      General: No focal deficit present  Mental Status: He is alert and oriented to person, place, and time  Psychiatric:         Mood and Affect: Mood normal          Behavior: Behavior normal          Thought Content:  Thought content normal          Judgment: Judgment normal           Mali Pereyra DO

## 2021-02-15 NOTE — PROGRESS NOTES
Chief Complaint   Patient presents with    Follow-up     4 month f/u    Medicare Wellness Visit     subsequent medicare       Assessment/Plan:  Refill Med's, reviewed prior labs and needed physical activity  Discussed nutrition  BMI Counseling: Body mass index is 38 44 kg/m²  The BMI is above normal  Nutrition recommendations include consuming healthier snacks, moderation in carbohydrate intake and increasing intake of lean protein  PHQ-9 Depression Screening    PHQ-9:   Frequency of the following problems over the past two weeks:      Little interest or pleasure in doing things: 0 - not at all  Feeling down, depressed, or hopeless: 0 - not at all  Trouble falling or staying asleep, or sleeping too much: 0 - not at all  Feeling tired or having little energy: 0 - not at all  Poor appetite or overeatin - not at all  Feeling bad about yourself - or that you are a failure or have let yourself or your family down: 0 - not at all  Trouble concentrating on things, such as reading the newspaper or watching television: 0 - not at all  Moving or speaking so slowly that other people could have noticed  Or the opposite - being so fidgety or restless that you have been moving around a lot more than usual: 0 - not at all  Thoughts that you would be better off dead, or of hurting yourself in some way: 0 - not at all  PHQ-2 Score: 0  PHQ-9 Score: 0       Fall Risk Assesment      Most Recent Value   Fall Risk   One or more falls in the last year? No   How many times? --   Feels unsteady when standing or walking? No   Worried about falling? No        BMI Counseling: Body mass index is 38 44 kg/m²  The BMI is above normal  Nutrition recommendations include consuming healthier snacks, moderation in carbohydrate intake and increasing intake of lean protein  Diagnoses and all orders for this visit:    Medicare annual wellness visit, subsequent    Essential hypertension  -     losartan (COZAAR) 100 MG tablet;  Take 1 tablet (100 mg total) by mouth daily  -     hydrochlorothiazide (HYDRODIURIL) 25 mg tablet; Take 1 tablet (25 mg total) by mouth daily    Hypercalcemia  -     potassium chloride (Klor-Con 10) 10 mEq tablet; Take 1 tablet (10 mEq total) by mouth daily    Anxiety  -     LORazepam (ATIVAN) 1 mg tablet; Take 1 tablet (1 mg total) by mouth 3 (three) times a day  -     ALPRAZolam (XANAX) 0 25 mg tablet; Take 1 tablet (0 25 mg total) by mouth 4 (four) times a day    Heart burn  -     famotidine (PEPCID) 40 MG tablet; Take 1 tablet (40 mg total) by mouth daily    Thrombocytopenia (HCC)    Erythrocytosis    Current mild episode of major depressive disorder without prior episode (HCC)    Obesity, morbid (HCC)          Subjective:      Patient ID: Nicole Noriega  is a 65 y o  male  Refills  Reviewed prior labs  Follows with Hematology  The following portions of the patient's history were reviewed and updated as appropriate: allergies, current medications, past medical history, past social history, past surgical history and problem list   I have spent 40 minutes with Patient  today in which greater than 50% of this time was spent in counseling/coordination of care regarding Diagnostic results, Risks and benefits of tx options, Intructions for management, Patient and family education, Importance of tx compliance, Risk factor reductions and Impressions  Review of Systems   Constitutional: Negative  HENT: Negative  Eyes: Negative  Respiratory: Negative  Cardiovascular: Negative  Gastrointestinal: Negative  Genitourinary: Negative  Musculoskeletal: Negative  Skin: Negative  Neurological: Negative  Psychiatric/Behavioral: Negative            Objective:      /80 (BP Location: Left arm, Patient Position: Sitting, Cuff Size: Standard)   Pulse 86   Temp (!) 97 1 °F (36 2 °C) (Tympanic)   Ht 5' 5" (1 651 m)   Wt 105 kg (231 lb)   SpO2 98%   BMI 38 44 kg/m²       Current Outpatient Medications:     ALPRAZolam (XANAX) 0 25 mg tablet, Take 1 tablet (0 25 mg total) by mouth 4 (four) times a day, Disp: 120 tablet, Rfl: 5    Ascorbic Acid (VITAMIN C) 100 MG CHEW, Chew 1 tablet daily, Disp: , Rfl:     atenolol (TENORMIN) 100 mg tablet, Take 1 tablet (100 mg total) by mouth 2 (two) times a day, Disp: 180 tablet, Rfl: 3    calcium citrate-vitamin d (CALCIUM CITRATE CHEWY BITE) 500-500 MG-UNIT CHEW chewable tablet, Chew, Disp: , Rfl:     Cholecalciferol (VITAMIN D3) 1000 units CAPS, Take by mouth, Disp: , Rfl:     doxycycline (DORYX) 150 MG EC tablet, Take 150 mg by mouth daily, Disp: , Rfl: 6    econazole nitrate 1 % cream, Apply topically daily, Disp: , Rfl:     escitalopram (LEXAPRO) 10 mg tablet, Take 1 tablet (10 mg total) by mouth daily, Disp: 90 tablet, Rfl: 3    famotidine (PEPCID) 40 MG tablet, Take 1 tablet (40 mg total) by mouth daily, Disp: 90 tablet, Rfl: 1    hydrochlorothiazide (HYDRODIURIL) 25 mg tablet, Take 1 tablet (25 mg total) by mouth daily, Disp: 90 tablet, Rfl: 1    hydrocortisone 2 5 % cream, Apply topically, Disp: , Rfl:     ILEVRO 0 3 % SUSP, INSTILL 1 DROP INTO RIGHT EYE EVERY DAY STARTING DAY OF SURGERY FOR 28 DAYS, Disp: , Rfl: 1    ketoconazole (NIZORAL) 2 % cream, APPLY TO SKIN TWICE DAILY AS DIRECTED, Disp: , Rfl: 2    LORazepam (ATIVAN) 1 mg tablet, Take 1 tablet (1 mg total) by mouth 3 (three) times a day, Disp: 90 tablet, Rfl: 5    losartan (COZAAR) 100 MG tablet, Take 1 tablet (100 mg total) by mouth daily, Disp: 90 tablet, Rfl: 1    Magnesium 250 MG TABS, Take by mouth, Disp: , Rfl:     metroNIDAZOLE (METROGEL) 1 % gel, Apply topically, Disp: , Rfl:     Multiple Vitamin (MULTIVITAMINS PO), Take 1 tablet by mouth daily, Disp: , Rfl:     potassium chloride (Klor-Con 10) 10 mEq tablet, Take 1 tablet (10 mEq total) by mouth daily, Disp: 90 tablet, Rfl: 3     Physical Exam  Constitutional:       Appearance: Normal appearance   He is well-developed  HENT:      Head: Normocephalic and atraumatic  Right Ear: Tympanic membrane, ear canal and external ear normal       Left Ear: Tympanic membrane, ear canal and external ear normal       Nose: Nose normal       Mouth/Throat:      Mouth: Mucous membranes are moist       Pharynx: Oropharynx is clear  Eyes:      Extraocular Movements: Extraocular movements intact  Conjunctiva/sclera: Conjunctivae normal       Pupils: Pupils are equal, round, and reactive to light  Neck:      Musculoskeletal: Normal range of motion and neck supple  Cardiovascular:      Rate and Rhythm: Normal rate and regular rhythm  Heart sounds: Normal heart sounds  Pulmonary:      Effort: Pulmonary effort is normal       Breath sounds: Normal breath sounds  Abdominal:      General: Abdomen is flat  Bowel sounds are normal       Palpations: Abdomen is soft  Skin:     General: Skin is warm and dry  Neurological:      General: No focal deficit present  Mental Status: He is alert and oriented to person, place, and time  Deep Tendon Reflexes: Reflexes are normal and symmetric  Psychiatric:         Mood and Affect: Mood normal          Behavior: Behavior normal          Thought Content:  Thought content normal          Judgment: Judgment normal

## 2021-04-14 ENCOUNTER — TELEPHONE (OUTPATIENT)
Dept: HEMATOLOGY ONCOLOGY | Facility: CLINIC | Age: 71
End: 2021-04-14

## 2021-04-14 NOTE — TELEPHONE ENCOUNTER
LVM for patient stating we had to r/s his appointment with Dr Jesica De León from 4/23/21 at 1 PM to 4/30/21 at 8:40 AM  Instructed patient to call our office if this does not work for him

## 2021-04-26 LAB
CHOLEST SERPL-MCNC: 156 MG/DL
CHOLEST/HDLC SERPL: 5.2 (CALC)
HDLC SERPL-MCNC: 30 MG/DL
NONHDLC SERPL-MCNC: 126 MG/DL (CALC)
TRIGL SERPL-MCNC: 468 MG/DL

## 2021-04-27 LAB
BASOPHILS # BLD AUTO: 30 CELLS/UL (ref 0–200)
BASOPHILS NFR BLD AUTO: 0.5 %
EOSINOPHIL # BLD AUTO: 171 CELLS/UL (ref 15–500)
EOSINOPHIL NFR BLD AUTO: 2.9 %
ERYTHROCYTE [DISTWIDTH] IN BLOOD BY AUTOMATED COUNT: 12.9 % (ref 11–15)
HCT VFR BLD AUTO: 50.1 % (ref 38.5–50)
HGB BLD-MCNC: 17.1 G/DL (ref 13.2–17.1)
LYMPHOCYTES # BLD AUTO: 1410 CELLS/UL (ref 850–3900)
LYMPHOCYTES NFR BLD AUTO: 23.9 %
MCH RBC QN AUTO: 33.1 PG (ref 27–33)
MCHC RBC AUTO-ENTMCNC: 34.1 G/DL (ref 32–36)
MCV RBC AUTO: 97.1 FL (ref 80–100)
MONOCYTES # BLD AUTO: 755 CELLS/UL (ref 200–950)
MONOCYTES NFR BLD AUTO: 12.8 %
NEUTROPHILS # BLD AUTO: 3534 CELLS/UL (ref 1500–7800)
NEUTROPHILS NFR BLD AUTO: 59.9 %
PLATELET # BLD AUTO: 80 THOUSAND/UL (ref 140–400)
PMV BLD REES-ECKER: 11 FL (ref 7.5–12.5)
RBC # BLD AUTO: 5.16 MILLION/UL (ref 4.2–5.8)
SERVICE CMNT-IMP: ABNORMAL
WBC # BLD AUTO: 5.9 THOUSAND/UL (ref 3.8–10.8)

## 2021-04-30 ENCOUNTER — IMMUNIZATIONS (OUTPATIENT)
Dept: FAMILY MEDICINE CLINIC | Facility: HOSPITAL | Age: 71
End: 2021-04-30

## 2021-04-30 ENCOUNTER — OFFICE VISIT (OUTPATIENT)
Dept: HEMATOLOGY ONCOLOGY | Facility: CLINIC | Age: 71
End: 2021-04-30
Payer: COMMERCIAL

## 2021-04-30 VITALS
DIASTOLIC BLOOD PRESSURE: 76 MMHG | HEART RATE: 70 BPM | BODY MASS INDEX: 38.15 KG/M2 | WEIGHT: 229 LBS | SYSTOLIC BLOOD PRESSURE: 130 MMHG | RESPIRATION RATE: 18 BRPM | TEMPERATURE: 96.5 F | HEIGHT: 65 IN

## 2021-04-30 DIAGNOSIS — E78.5 HYPERLIPIDEMIA, UNSPECIFIED HYPERLIPIDEMIA TYPE: ICD-10-CM

## 2021-04-30 DIAGNOSIS — D69.6 THROMBOCYTOPENIA (HCC): Primary | ICD-10-CM

## 2021-04-30 DIAGNOSIS — Z23 ENCOUNTER FOR IMMUNIZATION: Primary | ICD-10-CM

## 2021-04-30 DIAGNOSIS — I10 ESSENTIAL HYPERTENSION: ICD-10-CM

## 2021-04-30 DIAGNOSIS — R16.1 SPLENOMEGALY: ICD-10-CM

## 2021-04-30 DIAGNOSIS — D75.1 ERYTHROCYTOSIS: ICD-10-CM

## 2021-04-30 PROCEDURE — 3008F BODY MASS INDEX DOCD: CPT | Performed by: INTERNAL MEDICINE

## 2021-04-30 PROCEDURE — 1036F TOBACCO NON-USER: CPT | Performed by: INTERNAL MEDICINE

## 2021-04-30 PROCEDURE — 3075F SYST BP GE 130 - 139MM HG: CPT | Performed by: INTERNAL MEDICINE

## 2021-04-30 PROCEDURE — 99213 OFFICE O/P EST LOW 20 MIN: CPT | Performed by: INTERNAL MEDICINE

## 2021-04-30 PROCEDURE — 0001A SARS-COV-2 / COVID-19 MRNA VACCINE (PFIZER-BIONTECH) 30 MCG: CPT

## 2021-04-30 PROCEDURE — 3078F DIAST BP <80 MM HG: CPT | Performed by: INTERNAL MEDICINE

## 2021-04-30 PROCEDURE — 1160F RVW MEDS BY RX/DR IN RCRD: CPT | Performed by: INTERNAL MEDICINE

## 2021-04-30 PROCEDURE — 91300 SARS-COV-2 / COVID-19 MRNA VACCINE (PFIZER-BIONTECH) 30 MCG: CPT

## 2021-04-30 NOTE — PROGRESS NOTES
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  Spleen size is 11 9 cm  Patient also has JAK2 mutation negative erythrocytosis at least since June 2018  Erythropoietin 17 9, top normal    Patient  mentioned that he has this problem since 2003 and was told by a hematologist not to worry about it  Lymph nodes were removed in 1992   from bilateral anterior cervical when he had cat scratch fever and nodes were necrotic  History of Parathyroid gland removal    History of hypertension, anxiety and rosacea    Has some tiredness and arthritic symptoms        No history of bleeding or blood clot      Current Outpatient Medications:     ALPRAZolam (XANAX) 0 25 mg tablet, Take 1 tablet (0 25 mg total) by mouth 4 (four) times a day, Disp: 120 tablet, Rfl: 5    Ascorbic Acid (VITAMIN C) 100 MG CHEW, Chew 1 tablet daily, Disp: , Rfl:     atenolol (TENORMIN) 100 mg tablet, Take 1 tablet (100 mg total) by mouth 2 (two) times a day, Disp: 180 tablet, Rfl: 3    calcium citrate-vitamin d (CALCIUM CITRATE CHEWY BITE) 500-500 MG-UNIT CHEW chewable tablet, Chew, Disp: , Rfl:     Cholecalciferol (VITAMIN D3) 1000 units CAPS, Take by mouth, Disp: , Rfl:     doxycycline (DORYX) 150 MG EC tablet, Take 150 mg by mouth daily, Disp: , Rfl: 6    econazole nitrate 1 % cream, Apply topically daily, Disp: , Rfl:     famotidine (PEPCID) 40 MG tablet, Take 1 tablet (40 mg total) by mouth daily, Disp: 90 tablet, Rfl: 1    hydrochlorothiazide (HYDRODIURIL) 25 mg tablet, Take 1 tablet (25 mg total) by mouth daily, Disp: 90 tablet, Rfl: 1    hydrocortisone 2 5 % cream, Apply topically, Disp: , Rfl:     ILEVRO 0 3 % SUSP, INSTILL 1 DROP INTO RIGHT EYE EVERY DAY STARTING DAY OF SURGERY FOR 28 DAYS, Disp: , Rfl: 1    ketoconazole (NIZORAL) 2 % cream, APPLY TO SKIN TWICE DAILY AS DIRECTED, Disp: , Rfl: 2    LORazepam (ATIVAN) 1 mg tablet, Take 1 tablet (1 mg total) by mouth 3 (three) times a day, Disp: 90 tablet, Rfl: 5    losartan (COZAAR) 100 MG tablet, Take 1 tablet (100 mg total) by mouth daily, Disp: 90 tablet, Rfl: 1    Magnesium 250 MG TABS, Take by mouth, Disp: , Rfl:     metroNIDAZOLE (METROGEL) 1 % gel, Apply topically, Disp: , Rfl:     Multiple Vitamin (MULTIVITAMINS PO), Take 1 tablet by mouth daily, Disp: , Rfl:     potassium chloride (Klor-Con 10) 10 mEq tablet, Take 1 tablet (10 mEq total) by mouth daily, Disp: 90 tablet, Rfl: 3    escitalopram (LEXAPRO) 10 mg tablet, Take 1 tablet (10 mg total) by mouth daily, Disp: 90 tablet, Rfl: 3    Allergies   Allergen Reactions    Morphine Hallucinations       Oncology History    No history exists  ROS:  04/30/21 Reviewed 12 systems: See symptoms in HPI:  Presently no other neurological, cardiac, pulmonary, GI and  symptoms  Other symptoms are in HPI  No  fever, chills, bleeding, bone pains, skin rash, weight loss,   weakness, numbness,  claudication and gait problem  No frequent infections  Not unusually sensitive to heat or cold  No swelling of the ankles  No swollen glands  Patient is anxious  /76 (BP Location: Left arm, Patient Position: Sitting, Cuff Size: Adult)   Pulse 70   Temp (!) 96 5 °F (35 8 °C)   Resp 18   Ht 5' 5" (1 651 m)   Wt 104 kg (229 lb)   BMI 38 11 kg/m²     Physical Exam:  Alert, oriented, not in distress, no icterus, no oral thrush, no palpable neck mass, clear lung fields, regular heart rate, abdomen  soft and non tender, no palpable abdominal mass, no ascites, no edema of ankles, no calf tenderness, no focal neurological deficit, no skin rash, no palpable lymphadenopathy, good arterial pulses, no clubbing  Patient is anxious    Performance status 1   No change in examination since last visit    IMAGING:  IMPRESSION:        1  Mild hepatic steatosis  2   Mild splenomegaly  3   Cholelithiasis without cholecystitis  4   Bilateral lower pole renal cysts                  Workstation performed: [id number]      Imaging     US abdomen complete (Order: [id number]) - 1/3/2019       LABS:    Results for orders placed or performed in visit on 04/26/21   CBC and differential   Result Value Ref Range    White Blood Cell Count 5 9 3 8 - 10 8 Thousand/uL    Red Blood Cell Count 5 16 4 20 - 5 80 Million/uL    Hemoglobin 17 1 13 2 - 17 1 g/dL    HCT 50 1 (H) 38 5 - 50 0 %    MCV 97 1 80 0 - 100 0 fL    MCH 33 1 (H) 27 0 - 33 0 pg    MCHC 34 1 32 0 - 36 0 g/dL    RDW 12 9 11 0 - 15 0 %    Platelet Count 80 (L) 140 - 400 Thousand/uL    SL AMB MPV 11 0 7 5 - 12 5 fL    Neutrophils (Absolute) 3,534 1,500 - 7,800 cells/uL    Lymphocytes (Absolute) 1,410 850 - 3,900 cells/uL    Monocytes (Absolute) 755 200 - 950 cells/uL    Eosinophils (Absolute) 171 15 - 500 cells/uL    Basophils ABS 30 0 - 200 cells/uL    Neutrophils 59 9 %    Lymphocytes 23 9 %    Monocytes 12 8 %    Eosinophils 2 9 %    Basophils PCT 0 5 %    Comment(s)       Review of peripheral smear confirms automated results  Labs, Imaging, & Other studies:   All pertinent labs and imaging studies were personally reviewed    Lab Results   Component Value Date     06/10/2017    K 3 7 10/16/2020     10/16/2020    CO2 34 (H) 10/16/2020    ANIONGAP 6 04/16/2013    BUN 18 10/16/2020    CREATININE 1 01 10/16/2020    CALCIUM 9 7 10/16/2020    AST 26 10/16/2020    ALT 31 10/16/2020    ALKPHOS 48 10/16/2020    PROT 6 3 12/23/2016    BILITOT 1 2 12/23/2016     Lab Results   Component Value Date    WBC 5 9 04/26/2021    HGB 17 1 04/26/2021    HCT 50 1 (H) 04/26/2021    MCV 97 1 04/26/2021    PLT 80 (L) 04/26/2021     Normal differential count    Reviewed CBC and CMP and discussed with patient  Assessment and plan:   Patient came with his family member  Patient is being followed chronic modest thrombocytopenia that has been fluctuating between 80,000 and 118,000 at least since June 2018  Platelet count is 26619 this time  No bleeding  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  Spleen size is 11 9 cm  Patient also has JAK2 mutation negative erythrocytosis at least since June 2018  Erythropoietin 17 9, top normal    Patient  mentioned that he has this problem since 2003 and was told by a hematologist not to worry about it  Lymph nodes were removed in 1992   from bilateral anterior cervical when he had cat scratch fever and nodes were necrotic  History of Parathyroid gland removal    History of hypertension, anxiety and rosacea    Has some tiredness and arthritic symptoms     No history of bleeding or blood clot      Physical examination and test results are as recorded and discussed     Plan is surveillance  Blood counts every 2 months visit in 6 months  Patient agreed with the plan  See diagnoses, orders and instructions below  Discussed the importance of eating healthy foods, staying active and health screening test   Patient is capable of self-care  Discussed precautions against coronavirus  Patient states he gets complete physical from his primary physician every year including blood tests  Goal is monitoring of patient's blood counts  All discussed in detail  Questions answered   Discussed precautions against coronavirus  Patient will continue to follow with his primary physician and other consultants  1  Thrombocytopenia (HCC)    - CBC and differential; Standing  - CBC and differential    2  Erythrocytosis      3  Splenomegaly      4  Essential hypertension      5  Hyperlipidemia, unspecified hyperlipidemia type        Blood count  every 2 months  Visit in 6 months      Patient voiced understanding and agrees       Counseling / Coordination of Care       Provided counseling and support

## 2021-05-11 ENCOUNTER — TELEPHONE (OUTPATIENT)
Dept: FAMILY MEDICINE CLINIC | Facility: CLINIC | Age: 71
End: 2021-05-11

## 2021-05-11 ENCOUNTER — OFFICE VISIT (OUTPATIENT)
Dept: FAMILY MEDICINE CLINIC | Facility: CLINIC | Age: 71
End: 2021-05-11
Payer: COMMERCIAL

## 2021-05-11 ENCOUNTER — HOSPITAL ENCOUNTER (OUTPATIENT)
Dept: RADIOLOGY | Facility: HOSPITAL | Age: 71
Discharge: HOME | End: 2021-05-11
Payer: COMMERCIAL

## 2021-05-11 VITALS
BODY MASS INDEX: 38.11 KG/M2 | HEIGHT: 65 IN | TEMPERATURE: 97.4 F | HEART RATE: 81 BPM | OXYGEN SATURATION: 98 % | SYSTOLIC BLOOD PRESSURE: 124 MMHG | DIASTOLIC BLOOD PRESSURE: 80 MMHG

## 2021-05-11 DIAGNOSIS — M25.572 ACUTE LEFT ANKLE PAIN: Primary | ICD-10-CM

## 2021-05-11 DIAGNOSIS — M25.572 ACUTE LEFT ANKLE PAIN: ICD-10-CM

## 2021-05-11 DIAGNOSIS — S82.851A CLOSED TRIMALLEOLAR FRACTURE OF RIGHT ANKLE, INITIAL ENCOUNTER: Primary | ICD-10-CM

## 2021-05-11 DIAGNOSIS — S96.911A STRAIN OF RIGHT ANKLE, INITIAL ENCOUNTER: ICD-10-CM

## 2021-05-11 PROCEDURE — 73610 X-RAY EXAM OF ANKLE: CPT

## 2021-05-11 PROCEDURE — 99213 OFFICE O/P EST LOW 20 MIN: CPT | Performed by: FAMILY MEDICINE

## 2021-05-11 NOTE — TELEPHONE ENCOUNTER
Called pt gave results per Dr Hackett told pt that I will call him in morning with ortho appointment and information pt verbalized understanding and will call back if needed

## 2021-05-11 NOTE — PROGRESS NOTES
Chief Complaint   Patient presents with    Ankle Injury     slipped and twisted right ankle 2 days ago    Blister     on inner right ankle       Assessment/Plan:  Ice 10 minutes 2X a day  Continue with the walker  Up and moving every 1 - 2 hours  Hydrate with water  XR ankle  Flexion and extension of ankle  4 inch ACE wrap applied  Diagnoses and all orders for this visit:    Acute left ankle pain  -     Cancel: XR ankle 3+ vw right; Future  -     XR ankle 3+ vw right; Future    Strain of right ankle, initial encounter          Subjective:      Patient ID: Ian Herrera  is a 65 y o  male  Right medial ankle pain  Slipped in backyard 2 days ago filling a bird feeder  Foot everted  The following portions of the patient's history were reviewed and updated as appropriate: allergies, current medications, past medical history, past social history, past surgical history and problem list     Review of Systems   Musculoskeletal: Positive for gait problem  Right ankle pain swelling - mostly medially           Objective:      /80 (BP Location: Right arm)   Pulse 81   Temp (!) 97 4 °F (36 3 °C) (Temporal)   Ht 5' 5" (1 651 m)   SpO2 98%   BMI 38 11 kg/m²       Current Outpatient Medications:     ALPRAZolam (XANAX) 0 25 mg tablet, Take 1 tablet (0 25 mg total) by mouth 4 (four) times a day, Disp: 120 tablet, Rfl: 5    Ascorbic Acid (VITAMIN C) 100 MG CHEW, Chew 1 tablet daily, Disp: , Rfl:     atenolol (TENORMIN) 100 mg tablet, Take 1 tablet (100 mg total) by mouth 2 (two) times a day, Disp: 180 tablet, Rfl: 3    calcium citrate-vitamin d (CALCIUM CITRATE CHEWY BITE) 500-500 MG-UNIT CHEW chewable tablet, Chew, Disp: , Rfl:     Cholecalciferol (VITAMIN D3) 1000 units CAPS, Take by mouth, Disp: , Rfl:     doxycycline (DORYX) 150 MG EC tablet, Take 150 mg by mouth daily, Disp: , Rfl: 6    econazole nitrate 1 % cream, Apply topically daily, Disp: , Rfl:     famotidine (PEPCID) 40 MG tablet, Take 1 tablet (40 mg total) by mouth daily, Disp: 90 tablet, Rfl: 1    hydrochlorothiazide (HYDRODIURIL) 25 mg tablet, Take 1 tablet (25 mg total) by mouth daily, Disp: 90 tablet, Rfl: 1    hydrocortisone 2 5 % cream, Apply topically, Disp: , Rfl:     ILEVRO 0 3 % SUSP, INSTILL 1 DROP INTO RIGHT EYE EVERY DAY STARTING DAY OF SURGERY FOR 28 DAYS, Disp: , Rfl: 1    ketoconazole (NIZORAL) 2 % cream, APPLY TO SKIN TWICE DAILY AS DIRECTED, Disp: , Rfl: 2    LORazepam (ATIVAN) 1 mg tablet, Take 1 tablet (1 mg total) by mouth 3 (three) times a day, Disp: 90 tablet, Rfl: 5    losartan (COZAAR) 100 MG tablet, Take 1 tablet (100 mg total) by mouth daily, Disp: 90 tablet, Rfl: 1    Magnesium 250 MG TABS, Take by mouth, Disp: , Rfl:     metroNIDAZOLE (METROGEL) 1 % gel, Apply topically, Disp: , Rfl:     Multiple Vitamin (MULTIVITAMINS PO), Take 1 tablet by mouth daily, Disp: , Rfl:     potassium chloride (Klor-Con 10) 10 mEq tablet, Take 1 tablet (10 mEq total) by mouth daily, Disp: 90 tablet, Rfl: 3    escitalopram (LEXAPRO) 10 mg tablet, Take 1 tablet (10 mg total) by mouth daily, Disp: 90 tablet, Rfl: 3     Allergies   Allergen Reactions    Morphine Hallucinations        Physical Exam  Constitutional:       Appearance: Normal appearance  Musculoskeletal:      Comments: Swelling right ankle, blood blister and bruising distal medial tibia area  Foot ankle alignment equal BL  No bony prominences palpated  Skin:     General: Skin is warm and dry  Neurological:      General: No focal deficit present  Mental Status: He is alert and oriented to person, place, and time  Psychiatric:         Mood and Affect: Mood normal          Behavior: Behavior normal          Thought Content:  Thought content normal          Judgment: Judgment normal

## 2021-05-11 NOTE — TELEPHONE ENCOUNTER
Call Saleem Xavier [facility]), tell him ankle is fractured and we will call ortho in am to get him an appointment    Thanks

## 2021-05-12 ENCOUNTER — TELEPHONE (OUTPATIENT)
Dept: OBGYN CLINIC | Facility: HOSPITAL | Age: 71
End: 2021-05-12

## 2021-05-12 NOTE — TELEPHONE ENCOUNTER
Patients PCP office called in to make him an appointment to be seen with Dr Francisco J Ngo in [facility] for tomorrow for a right ankle fracture but the Dr has no "new patient" slots available information forwarded to SME/practice admin to assist further         Call back [phone]

## 2021-05-14 ENCOUNTER — OFFICE VISIT (OUTPATIENT)
Dept: OBGYN CLINIC | Facility: CLINIC | Age: 71
End: 2021-05-14
Payer: COMMERCIAL

## 2021-05-14 ENCOUNTER — TRANSCRIBE ORDERS (OUTPATIENT)
Dept: LAB | Facility: CLINIC | Age: 71
End: 2021-05-14

## 2021-05-14 ENCOUNTER — OFFICE VISIT (OUTPATIENT)
Dept: LAB | Facility: CLINIC | Age: 71
End: 2021-05-14
Payer: COMMERCIAL

## 2021-05-14 VITALS
HEART RATE: 76 BPM | DIASTOLIC BLOOD PRESSURE: 81 MMHG | BODY MASS INDEX: 38.49 KG/M2 | HEIGHT: 65 IN | WEIGHT: 231 LBS | SYSTOLIC BLOOD PRESSURE: 128 MMHG

## 2021-05-14 DIAGNOSIS — Z01.818 PRE-OPERATIVE CLEARANCE: Primary | ICD-10-CM

## 2021-05-14 DIAGNOSIS — Z01.818 PRE-OPERATIVE CLEARANCE: ICD-10-CM

## 2021-05-14 DIAGNOSIS — S82.851A CLOSED TRIMALLEOLAR FRACTURE OF RIGHT ANKLE, INITIAL ENCOUNTER: ICD-10-CM

## 2021-05-14 LAB
ATRIAL RATE: 73 BPM
P AXIS: 49 DEGREES
PR INTERVAL: 166 MS
QRS AXIS: -50 DEGREES
QRSD INTERVAL: 148 MS
QT INTERVAL: 442 MS
QTC INTERVAL: 486 MS
T WAVE AXIS: 10 DEGREES
VENTRICULAR RATE: 73 BPM

## 2021-05-14 PROCEDURE — 93005 ELECTROCARDIOGRAM TRACING: CPT

## 2021-05-14 PROCEDURE — 1036F TOBACCO NON-USER: CPT | Performed by: ORTHOPAEDIC SURGERY

## 2021-05-14 PROCEDURE — 93010 ELECTROCARDIOGRAM REPORT: CPT | Performed by: INTERNAL MEDICINE

## 2021-05-14 PROCEDURE — 1160F RVW MEDS BY RX/DR IN RCRD: CPT | Performed by: ORTHOPAEDIC SURGERY

## 2021-05-14 PROCEDURE — 99203 OFFICE O/P NEW LOW 30 MIN: CPT | Performed by: ORTHOPAEDIC SURGERY

## 2021-05-14 RX ORDER — CEFAZOLIN SODIUM 2 G/50ML
2000 SOLUTION INTRAVENOUS ONCE
Status: CANCELLED | OUTPATIENT
Start: 2021-05-17 | End: 2021-05-14

## 2021-05-14 RX ORDER — CHLORHEXIDINE GLUCONATE 4 G/100ML
SOLUTION TOPICAL DAILY PRN
Status: CANCELLED | OUTPATIENT
Start: 2021-05-14

## 2021-05-14 RX ORDER — CHLORHEXIDINE GLUCONATE 0.12 MG/ML
15 RINSE ORAL ONCE
Status: CANCELLED | OUTPATIENT
Start: 2021-05-14 | End: 2021-05-14

## 2021-05-14 NOTE — H&P (VIEW-ONLY)
Davis Carrel, M D  Attending, Orthopaedic Surgery  Foot and Ankle  [facility]        ORTHOPAEDIC FOOT AND ANKLE CLINIC VISIT-ANKLE FRACTURE     Assessment:     Encounter Diagnosis   Name Primary?  Closed trimalleolar fracture of right ankle, initial encounter               Plan:   · The patient verbalized understanding of exam findings and treatment plan  We engaged in the shared decision-making process and treatment options were discussed at length with the patient  Surgical and conservative management discussed today along with risks and benefits  · The patient has an unstable ankle fracture which is amenable to surgical fixation  · Consent signed in clinic today  · We will plan for surgery at the earliest mutually convenient time  · See back 3 weeks postop for suture removal and transition from splint to CAM boot    CONSENT FOR ANKLE FRACTURE OPEN REDUCTION INTERNAL FIXATION (ORIF): We have discussed the procedure with the patient in detail, including fixation of the fibula with a plate and screws as well as possible need for deltoid ligament repair and/or syndesmotic screw fixation  Potential syndesmotic screw breakage was explained as an anticipated sequela as well  Patient understands that there is no guarantee that the surgery will relieve all of their pain and also understands that there may be a prolonged course of protected weight-bearing status required which will restrict them from driving and other activities as discussed at today's visit  Patient recognizes that there are risks with surgery including bleeding, numbness, nerve irritation, wound complications, infection, continued pain, joint stiffness, malunion, nonunion, anesthetic complications, death, failure of procedure, development of arthritis and possible need for further surgery  The patient understands that there is no guarantee that this surgery will relieve all of His pain and symptoms    Patient understands that there is no guarantee that they will return to full function after the procedure  Patient has provided informed consent for the procedure  History of Present Illness:   Chief Complaint: right ankle fracture    Ana Bland  is a 65 y o  male who is being seen for  right trimalleolar fracture  Sustained the injury 5/11/21  Pain is localized at medial,lateral,and posterior ankle with minimal radiating and described as sharp and severe  Patient denies numbness, tingling or radicular pain  Denies history of neuropathy  Patient does not smoke, does not have diabetes and does not take blood thinners  Patient denies family history of anesthesia complications and has not had any complications with anesthesia  Pain/symptom timing:  Worse during the day when active  Pain/symptom context:  Worse with activites and work  Pain/symptom modifying factors:  Rest makes better, activities make worse  Pain/symptom associated signs/symptoms: none    Prior treatment   · NSAIDsNo    · Injections No   · Bracing/Orthotics No   · Physical Therapy No     Orthopedic Surgical History:   See Below    Past Medical, Surgical and Social History:  Past Medical History:  has a past medical history of Cat-scratch disease, Cellulitis, Erysipelas, Parathyroid adenoma, Rosacea, Thrombocytopenia ([facility] [address] ), and Tick bites  Problem List: does not have any pertinent problems on file  Past Surgical History:  has a past surgical history that includes Lymphadenectomy; Appendectomy; Parathyroidectomy; and Tooth extraction  Family History: family history includes Aortic aneurysm in his mother; Breast cancer in his paternal aunt and paternal grandmother; Diabetes in his paternal uncle; Heart attack in his father; Hypertension in his mother  Social History:  reports that he has quit smoking  He has never used smokeless tobacco  He reports that he does not drink alcohol  No history on file for drug    Current Medications: has a current medication list which includes the following prescription(s): alprazolam, vitamin c, atenolol, calcium citrate-vitamin d, vitamin d3, doxycycline, econazole nitrate, escitalopram, famotidine, hydrochlorothiazide, hydrocortisone, ilevro, ketoconazole, lorazepam, losartan, magnesium, metronidazole, multiple vitamin, and potassium chloride  Allergies: is allergic to morphine  Review of Systems:  General- denies fever/chills  HEENT- denies hearing loss or sore throat  Eyes- denies eye pain or visual disturbances, denies red eyes  Respiratory- denies cough or SOB  Cardio- denies chest pain or palpitations  GI- denies abdominal pain  Endocrine- denies urinary frequency  Urinary- denies pain with urination  Musculoskeletal- Negative except noted above  Skin- denies rashes or wounds  Neurological- denies dizziness or headache  Psychiatric- denies anxiety or difficulty concentrating    Physical Exam:   There were no vitals taken for this visit  General/Constitutional: No apparent distress: well-nourished and well developed  Eyes: normal ocular motion  Cardio: RRR, Normal S1S2, No m/r/g  Lymphatic: No appreciable lymphadenopathy  Respiratory: Non-labored breathing, CTA b/l no w/c/r  Vascular: No edema, swelling or tenderness, except as noted in detailed exam   Integumentary: No impressive skin lesions present, except as noted in detailed exam   Neuro: No ataxia or tremors noted  Psych: Normal mood and affect, oriented to person, place and time  Appropriate affect  Musculoskeletal: Normal, except as noted in detailed exam and in HPI      Examination    right    Gait Not assessed due to unstable ankle fracture   Musculoskeletal Tender to palpation at fracture site    Skin What can be seen in the splint is Normal       Nails Normal    Range of Motion  Not assessed due to unstable ankle fracture    Stability Unstable    Muscle Strength N/A tibialis anterior  N/A gastrocnemius-soleus  N/A posterior tibialis  N/A peroneal/eversion strength  5/5 EHL  5/5 FHL    Neurologic Normal    Sensation  Intact to light touch throughout sural, saphenous, superficial peroneal, deep peroneal and medial/lateral plantar nerve distributions  [place] 07 filament (10g) testing  deferred  Cardiovascular Brisk capillary refill < 2 seconds,intact DP and PT pulses    Special Tests None      Imaging Studies:   3 views of the  right ankle were taken, reviewed and interpreted independently that demonstrate displaced trimalleolar fracture  Reviewed by me personally  Authur Distel Lachman, MD  Foot & Ankle Surgery   Department of [address] [place] [address]      I personally performed the service  Authur Distel Lachman, MD    Scribe Attestation    I,:   am acting as a scribe while in the presence of the attending physician :       I,:   personally performed the services described in this documentation    as scribed in my presence : Last ECHO in 2016 revealed EF of 40% 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

## 2021-05-14 NOTE — PATIENT INSTRUCTIONS
KATIE Biswas  Attending, [address] [place] [address] Office Phone: [phone] ? Fax: [phone]  [address] [place] [address] Office Phone: [phone] ? BVZ:[phone]    : Sherwin Licea) [place], [place]     Surgery Coordinators Buffy Thomas: Elier Wallis, [address] [facility], [phone]  Surgery Coordinator Isabel:  Jonn Terrazas 31, [phone]  www [email or web address]   PRE-OPERATIVE AND POST-OPERATIVE INSTRUCTIONS    General Information:   Your surgery is with Dr Salvador Sandoval  Dates can change (although rare) depending on emergencies   Typical post operative visits are at the following intervals:  3 weeks post surgery(except 1 week for bunions and wound monitoring), 6 weeks post surgery, 3 months post surgery, 6 months post surgery, and then on a yearly basis  However, this may change based on Dr Breanna Trujillo recommendation   #1 post-operative rule for foot/ankle surgery:  ONCE YOU ARE OUT OF YOUR CAST AND/OR REMOVABLE BOOT, SWELLING MAY PERSIST FOR MANY MONTHS  YOU MIGHT ALSO EXPERIENCE A BLUISH DISCOLORATION OF YOUR LEG  THIS IS NORMAL AND PART OF THE USUAL POSTOPERATIVE EXPERIENCE  SMOKING:   Smoking results in incomplete healing of fractures (broken bones) and joints that my have been fused  Smoking and nicotine also prevents the growth of bone into ankle replacements and bone healing  It also slows the healing of muscles and skin (soft tissue)  Therefore, please do not have surgery if you continue to smoke  We reserve the right to cancel your surgery if we suspect that you are smoking  DO NOT use nicorette gum or other patches  Please find an alternative method to quit smoking before your surgery  Pre-Operative Information:   Surgery date and preoperative visits:  a   If you have medical problems, such as an abnormal EKG, history of BLOOD CLOT, ANEURYSM, and any other heart condition, please inform us so that we can get your medical clearance several weeks before the surgery  Please bring any important medical information, such as an EKG, chest x-ray, or echocardiogram, with you to ensure that your surgery will not be delayed  b  If needed, you will receive your preoperative appointments in the mail or by phone from our scheduling office  The location of the preoperative appointment will be given to you also   c  You may not eat after midnight the night before surgery  If you do, your surgery will be cancelled  d   Carolee Vaca will receive a phone call from your surgery center the day before your surgery (if your surgery is on a Monday, you will get a call the Friday before)  If you do not hear from someone by 4pm the day before your surgery, please call the Surgical coordinator (number above) to notify us   e  Start taking Vitamin D3 4000 units per day and Calcium 1200mg per day immediately  You will continue this until your 3 month post-op visit  These are over the counter and available at all pharmacies and supermarkets  f  FOR THOSE HAVING SURGERY AT [address] [place] [address] WILL NEED CRUTCHES OR A ROLLING WALKER AFTER SURGERY, ASK FOR A PRESCRIPTION FOR THIS FROM OUR OFFICE TODAY  THIS CANNOT BE HANDLED THE DAY OF SURGERY AS [facility] DOES NOT STOCK THESE   Because bacterial can often enter any defect in the skin, it is important to avoid any cuts before surgery  Any breaks in the skin on the leg will often result in your surgery being postponed  Please avoid going on a very long walk the day prior to surgery, or doing other activities that could lead to irritation of the skin, including yard work, extra athletic activity, or shaving  This could result in surgery cancellation   You MUST be fasting the day of your surgery    Therefore, please do not consume any foot or beverage after midnight the night before surgery  The morning of surgery you may take your usual medications with a sip of water   It is important not to take anti-inflammatory medication like Ibuprofen, Motrin, Naproxen (Aleve), or Aspirin 7-10 days before surgery because they will make you bleed more than usual   Vitamin, E, Plavix and Coumadin also have the same effect  Stop Aspirin and Vitamin E two weeks before surgery  YOUR MEDICAL DOCTOR SHOULD TELL YOU WHEN TO STOP COUMADIN OR PLAVIX   If your surgery involves any bone healing, please do not take anti-inflammatories for at least 6 weeks after surgery  This can impede bone healing (ibuprofen, Aleve, Relafen, iodine)  Tylenol is fine to take  PREOPERATIVE BATHING INSTRUCTIONS:     Before your surgery, bathe with Hibiclens (4% Chlorhexidene) as instructed below  This skin cleanser will help reduce the bacteria on your skin before surgery  To avoid irritating your eyes, do not apply Hibiclens above the level of your neck   o On the evening before AND the morning of surgery, bathe your entire body except the face and scalp, then rinse freely  o DO NOT apply to your face or scalp, as Hibiclens can irritate your eyes   Purchasing information:   Hibiclens is available without a prescription at [facility]  ADDITIONAL INSTRUCTIONS:  PATIENTS HAVING FOOT/ANKLE SURGERY     In preparation for your upcoming surgery, we kindly request and advise the following:   Notify our office if you are taking any of the following:  Coumadin (warfarin):  Persantine (dipyridamole); Pletal (cilostazol); Plavix (clopidogrel); Ticlid (ticlopidine); Agrylin (anagrelide); Aggrenox (dipyridamole and aspirin) or other blood thinners,   In addition, stop taking Vitamin E and herbal supplements   Do not schedule any elective dental work for at least 6 months after surgery    If you had an ankle replacement, you will need to take antibiotics before any future dental procedures  Your dentist or our office can prescribe these for you  1000mg of Amoxicillin 1 hour prior to any dental procedure is the recommended dosing  THREE RULES:    1  After surgery you will most likely be given the instructions KEEP YOUR TOES ABOVE YOUR NOSE    This means that you MUST have your feet elevated higher than your heart  Keeping your toes above your nose helps to heal the muscles and skin (soft tissues) by reducing swelling in your leg  This position also helps to prevent infection, and is very important in avoiding deep venous thrombosis (blood clots)  2  In order to keep the blood circulating in your legs and in order to avoid deep vein   thrombosis (blood clots), we ask patients to GET UP ONCE AN HOUR during the day  This means you should at least cross the room and come back  It does not mean you have to be up for long periods of time  In most cases we will not have people immediately put any weight on their operated part  This is important to prevent loosening of metal or other devices holding the bones together  It also prevents irritation of the soft tissues which can lead to prolonged healing  When we say get up once an hour, please walk, hop or move with an assisted device  This is important! 3  Do not do any excessive walking during the first few days after surgery  Recovering from surgery is a full-time task for the patient  Postoperative care is important to avoid irritating the skin incision, which can lead to infection  Please do not plan activities or go out of town for several weeks after surgery  If you are unsure about your future activities, please schedule surgery only when you know it is acceptable for you  Scheduling surgery and then canceling the date, prevents other people from having surgery on that date as it takes time to line everything up effectively    If you cancel your surgery the week of your planned surgery, we reserve the right to cancel all future surgical procedures  THE DAY OF SURGERY:     Arrival to the hospital or outpatient surgical center on time is imperative  If you arrive late, then your surgery will be cancelled  You MUST have a family member/friend bring you, stay with you throughout the DURATION of your surgery, and drive you home   You MUST be fasting the day of your surgery  Therefore, do not consume any food or beverage after midnight the night before surgery  At your pre-operative visit with the anesthesia staff, or during your phone screen, a nurse will instruct you what medications you will need to take the day of surgery   MAKE SURE THAT THE PHARMACY LISTED IN THE ELECTRONIC MEDICAL RECORD (EPIC) IS YOUR PREFERRED PHARMACY  For example, if you are staying with family or a friend, and will not be near your preferred pharmacy, YOU MUST, tell the nurses checking you in the day of surgery so that this can be changed in the system  If your prescriptions are sent to a pharmacy, this cannot be changed  AFTER YOUR SURGERY:   Bleeding through the bandage almost always occurs  Do not let this alarm you  Simply add more gauze or a towel, call us, and come in for a dressing change  If you think it is excessive, contact us immediately or go to the local emergency room   Do not get the bandage wet  Showering is possible with plastic protectors  Be very careful, as the bathroom can be wet and slippery  If you do get your dressing wet, it should be changed immediately  Please contact us   ONCE YOUR ARE OUT OF YOUR CAST AND/OR REMOVABLE BOOT, SWELLING MAY PERSIST FOR MANY MONTHS  YOU MIGHT ALSO EXPERIENCE A BLUISH DISCOLORATION OF YOUR LEG  THIS IS NORMAL AND PART OF THE USUAL POSTOPERATIVE EXPERIENCE  WEARING COMPRESSION HOSE (ELASTIC STOCKINGS) CAN HELP AVOID SOME OF THIS SWELLING        DRESSING:   The purpose of the surgical dressing is to keep your wound and the surgical site protected from the environment  Most dressings contain splints, which help to hold your foot and ankle in a corrected position, and also allow the surgical site to heal properly  Dressings will remain in place and undisturbed until the first postop visit  If you have a drain in place, this will need to be removed in 1-3 days after surgery  The time for the drain to be pulled will be written on your discharge instruction sheet  CAST  INSTRUCTIONS:  You may or may not get a cast following surgery  If you do, pay close attention to the following:     After application of a splint or cast, it is very important to elevate your leg for 24 to 72 hours  The injured area should be elevated well above the heart  Remember Toes above your Nose  Rest and elevation greatly reduce pain and speed the healing process by minimizing early swelling  CALL YOUR DOCTORS OFFICE OR VISIT LOCATION EMERGENCY ROOM IF YOU HAVE ANY OF THE FOLLOWING:     Significant increased pain, which may be caused by swelling, and the feeling that the splint or cast is too tight   Numbness and tingling in your hand or foot, which may be caused by too much pressure on the nerves   Burning and stinging, which may be caused by too much pressure on the skin   Excessive swelling below the cast, which may mean the cast is slowing your blood circulation   Loss of active movement of toes, which request an urgent evaluation   Loss of capillary refill  Pinch the tip of toes and blayne the skin  Release pressure and if the skin does not return pink then call the office immediately  DO NOT GET YOUR CAST WET  Bacteria thrive in moist dark areas  We do not want this  If your cast becomes wet, return to the office and we will apply another one  PAIN AFTER SURGERY:  Narcotic pain medication can and will depress your respiratory system if taken in excess  The goal of pain management with narcotics is to be comfortable not pain free    If you take enough narcotics to be pain free then you run the risk of stopping breathing  If this happens, call 911 immediately!  Pain in the heel is often  caused by pressure from the weight of your foot on the bed  Make sure your heel is suspended off the bed by keeping a pillow underneath your calf not your knee  Medications: You will be given narcotic pain medication  Do NOT drive while taking narcotic medications  Medications such as Darvocet, Percocet, Vicoden or Tylenol #3, also contain acetaminophen (Tylenol)  Do not take acetaminophen or Tylenol from home when taking theses medications  When you fill your prescription, you may ask the pharmacist if your pain medication has acetaminophen/Tylenol in it  It is okay to take Tylenol with Oxycontin/Oxycodone  Should you have pain after taking your prescription medication, ibuprophen (Motrin, Advil, and Alleve) is a common over the counter preparation and may often be taken with the prescription pain medication as long as you take them with food  These medications can irritate the stomach lining  Unless you are allergic to aspirin or currently taking a blood thinner, Dr Susana Logan patients are requested to take one 325 mg aspirin every 12 hours until you are back to walking normally after surgery (This can be up to 6 weeks)  Narcotic medications commonly cause nausea  Taking them with food will decrease this side effect  If you are having extreme nausea, please contact us for an alternative medication or for something that can be taken with this medication to decrease the nausea  Also, narcotic medications frequently cause constipation  An increase of fiber, fruits and vegetables in your diet may alleviate this problem, or if necessary, you may use an over-the-counter medication such as senekot, colace, or Fibercon for constipation problems  You should resume all medications you were taking prior to the surgery unless otherwise specified       Activity:   Because of your recent foot surgery, your activity level will decrease  You will need to elevate your foot ABOVE the level of your heart for a minimum of four days  The length of time necessary for the swelling to go down, and for your wounds to heal properly depends greatly on your efforts here  Elevation is extremely important to avoid compromising the blood supply to your foot  Remember when your foot is down it will swell, which will increase pain and slow healing  Wiggle your toes frequently if possible  If you go home with a regional block, (a type of anesthesia) the foot and leg will be numb  Think of ways to get into your house and around the house until the block wears off  Keep in mind that it may be a legal issue if you drive while in a cast or splint, especially when the splint is on the right foot  You may call the Department of [employer] to schedule a road test if you have adaptive equipment applied to your car  The amount of weight you are allowed to bear on your foot will be written on your discharge sheet filled out at the time of surgery  The following is an explanation of the possibilities:     Non-weight bearing: You are to put NO weight whatsoever on your foot  When using crutches or a walker, your foot should not touch the ground, except when you are standing  Then, it may rest on the ground  If you are to be non-weight bearing, and you are not compliant, you could compromise the surgery  Some of our patients have been requesting prescriptions for a roll-a-bout knee scooter  [employer] and other insurances have been denying these claims, and you may either have to rent one or pay out of pocket to purchase one  THIS SHOULD BE PURCHASED PRIOR TO THE SURGERY AND YOU SHOULD BRING IT WITH YOU THE DAY OF THE SURGERY TO AIDE IN GETTING FROM THE CAR INTO THE HOUSE AFTER SURGERY

## 2021-05-14 NOTE — PROGRESS NOTES
KATIE Toscano  Attending, Orthopaedic Surgery  Foot and Ankle  [facility]        ORTHOPAEDIC FOOT AND ANKLE CLINIC VISIT-ANKLE FRACTURE     Assessment:     Encounter Diagnosis   Name Primary?  Closed trimalleolar fracture of right ankle, initial encounter               Plan:   · The patient verbalized understanding of exam findings and treatment plan  We engaged in the shared decision-making process and treatment options were discussed at length with the patient  Surgical and conservative management discussed today along with risks and benefits  · The patient has an unstable ankle fracture which is amenable to surgical fixation  · Consent signed in clinic today  · We will plan for surgery at the earliest mutually convenient time  · See back 3 weeks postop for suture removal and transition from splint to CAM boot    CONSENT FOR ANKLE FRACTURE OPEN REDUCTION INTERNAL FIXATION (ORIF): We have discussed the procedure with the patient in detail, including fixation of the fibula with a plate and screws as well as possible need for deltoid ligament repair and/or syndesmotic screw fixation  Potential syndesmotic screw breakage was explained as an anticipated sequela as well  Patient understands that there is no guarantee that the surgery will relieve all of their pain and also understands that there may be a prolonged course of protected weight-bearing status required which will restrict them from driving and other activities as discussed at today's visit  Patient recognizes that there are risks with surgery including bleeding, numbness, nerve irritation, wound complications, infection, continued pain, joint stiffness, malunion, nonunion, anesthetic complications, death, failure of procedure, development of arthritis and possible need for further surgery  The patient understands that there is no guarantee that this surgery will relieve all of His pain and symptoms    Patient understands that there is no guarantee that they will return to full function after the procedure  Patient has provided informed consent for the procedure  History of Present Illness:   Chief Complaint: right ankle fracture    Aris Carranza  is a 65 y o  male who is being seen for  right trimalleolar fracture  Sustained the injury 5/11/21  Pain is localized at medial,lateral,and posterior ankle with minimal radiating and described as sharp and severe  Patient denies numbness, tingling or radicular pain  Denies history of neuropathy  Patient does not smoke, does not have diabetes and does not take blood thinners  Patient denies family history of anesthesia complications and has not had any complications with anesthesia  Pain/symptom timing:  Worse during the day when active  Pain/symptom context:  Worse with activites and work  Pain/symptom modifying factors:  Rest makes better, activities make worse  Pain/symptom associated signs/symptoms: none    Prior treatment   · NSAIDsNo    · Injections No   · Bracing/Orthotics No   · Physical Therapy No     Orthopedic Surgical History:   See Below    Past Medical, Surgical and Social History:  Past Medical History:  has a past medical history of Cat-scratch disease, Cellulitis, Erysipelas, Parathyroid adenoma, Rosacea, Thrombocytopenia ([facility] [address] ), and Tick bites  Problem List: does not have any pertinent problems on file  Past Surgical History:  has a past surgical history that includes Lymphadenectomy; Appendectomy; Parathyroidectomy; and Tooth extraction  Family History: family history includes Aortic aneurysm in his mother; Breast cancer in his paternal aunt and paternal grandmother; Diabetes in his paternal uncle; Heart attack in his father; Hypertension in his mother  Social History:  reports that he has quit smoking  He has never used smokeless tobacco  He reports that he does not drink alcohol  No history on file for drug    Current Medications: has a current medication list which includes the following prescription(s): alprazolam, vitamin c, atenolol, calcium citrate-vitamin d, vitamin d3, doxycycline, econazole nitrate, escitalopram, famotidine, hydrochlorothiazide, hydrocortisone, ilevro, ketoconazole, lorazepam, losartan, magnesium, metronidazole, multiple vitamin, and potassium chloride  Allergies: is allergic to morphine  Review of Systems:  General- denies fever/chills  HEENT- denies hearing loss or sore throat  Eyes- denies eye pain or visual disturbances, denies red eyes  Respiratory- denies cough or SOB  Cardio- denies chest pain or palpitations  GI- denies abdominal pain  Endocrine- denies urinary frequency  Urinary- denies pain with urination  Musculoskeletal- Negative except noted above  Skin- denies rashes or wounds  Neurological- denies dizziness or headache  Psychiatric- denies anxiety or difficulty concentrating    Physical Exam:   There were no vitals taken for this visit  General/Constitutional: No apparent distress: well-nourished and well developed  Eyes: normal ocular motion  Cardio: RRR, Normal S1S2, No m/r/g  Lymphatic: No appreciable lymphadenopathy  Respiratory: Non-labored breathing, CTA b/l no w/c/r  Vascular: No edema, swelling or tenderness, except as noted in detailed exam   Integumentary: No impressive skin lesions present, except as noted in detailed exam   Neuro: No ataxia or tremors noted  Psych: Normal mood and affect, oriented to person, place and time  Appropriate affect  Musculoskeletal: Normal, except as noted in detailed exam and in HPI      Examination    right    Gait Not assessed due to unstable ankle fracture   Musculoskeletal Tender to palpation at fracture site    Skin What can be seen in the splint is Normal       Nails Normal    Range of Motion  Not assessed due to unstable ankle fracture    Stability Unstable    Muscle Strength N/A tibialis anterior  N/A gastrocnemius-soleus  N/A posterior tibialis  N/A peroneal/eversion strength  5/5 EHL  5/5 FHL    Neurologic Normal    Sensation  Intact to light touch throughout sural, saphenous, superficial peroneal, deep peroneal and medial/lateral plantar nerve distributions  [place] 07 filament (10g) testing  deferred  Cardiovascular Brisk capillary refill < 2 seconds,intact DP and PT pulses    Special Tests None      Imaging Studies:   3 views of the  right ankle were taken, reviewed and interpreted independently that demonstrate displaced trimalleolar fracture  Reviewed by me personally  Alene Fordyce Lachman, MD  Foot & Ankle Surgery   Department of [address] [place] [address]      I personally performed the service  Alene Fordyce Lachman, MD    Scribe Attestation    I,:   am acting as a scribe while in the presence of the attending physician :       I,:   personally performed the services described in this documentation    as scribed in my presence :

## 2021-05-14 NOTE — PRE-PROCEDURE INSTRUCTIONS
Pre-Surgery Instructions:   Medication Instructions    ALPRAZolam (XANAX) 0 25 mg tablet Take as directed    Ascorbic Acid (VITAMIN C) 100 MG CHEW Hold prior to surgery    atenolol (TENORMIN) 100 mg tablet Take as directed    calcium citrate-vitamin d (CALCIUM CITRATE CHEWY BITE) 500-500 MG-UNIT CHEW chewable tablet Hold prior to surgery    Cholecalciferol (VITAMIN D3) 1000 units CAPS Hold prior to surgery    doxycycline (DORYX) 150 MG EC tablet PRN    escitalopram (LEXAPRO) 10 mg tablet Take as directed    famotidine (PEPCID) 40 MG tablet Take as directed    hydrochlorothiazide (HYDRODIURIL) 25 mg tablet Hold morning of surgery    hydrocortisone 2 5 % cream Do not use morning of surgery    ketoconazole (NIZORAL) 2 % cream Do not use morning of surgery    LORazepam (ATIVAN) 1 mg tablet Take as directed    losartan (COZAAR) 100 MG tablet Hold morning of surgery    Magnesium 250 MG TABS Hold prior to surgery    metroNIDAZOLE (METROGEL) 1 % gel Do not use morning of surgery    Multiple Vitamin (MULTIVITAMINS PO) Hold prior to surgery    potassium chloride (Klor-Con 10) 10 mEq tablet Hold prior to surgery      My Surgical Experience    The following information was developed to assist you to prepare for your operation  What do I need to do before coming to the hospital?   Arrange for a responsible person to drive you to and from the hospital    Arrange care for your children at home  Children are not allowed in the recovery areas of the hospital   Plan to wear clothing that is easy to put on and take off  If you are having shoulder surgery, wear a shirt that buttons or zippers in the front  Bathing  o Shower the evening before and the morning of your surgery with an antibacterial soap   Please refer to the Pre Op Showering Instructions for Surgery Patients Sheet   o Remove nail polish and all body piercing jewelry  o Do not shave any body part for at least 24 hours before surgery-this includes face, arms, legs and upper body  Food  o Nothing to eat or drink after midnight the night before your surgery  This includes candy and chewing gum  o Exception: If your surgery is after 12:00pm (noon), you may have clear liquids such as 7-Up®, ginger ale, apple or cranberry juice, Jell-O®, water, or clear broth until 8:00 am  o Do not drink milk or juice with pulp on the morning before surgery  o Do not drink alcohol 24 hours before surgery  Medicine  o Follow instructions you received from your surgeon about which medicines you may take on the day of surgery  o If instructed to take medicine on the morning of surgery, take pills with just a small sip of water  Call your prescribing doctor for specific infroamtion on what to do if you take insulin    What should I bring to the hospital?    Bring:  Christiane Candelaria or a walker, if you have them, for foot or knee surgery   A list of the daily medicines, vitamins, minerals, herbals and nutritional supplements you take  Include the dosages of medicines and the time you take them each day   Glasses, dentures or hearing aids   Minimal clothing; you will be wearing hospital sleepwear   Photo ID; required to verify your identity   If you have a Living Will or Power of , bring a copy of the documents   If you have an ostomy, bring an extra pouch and any supplies you use    Do not bring   Medicines or inhalers   Money, valuables or jewelry    What other information should I know about the day of surgery?  Notify your surgeons if you develop a cold, sore throat, cough, fever, rash or any other illness   Report to the Ambulatory Surgical/Same Day Surgery Unit   You will be instructed to stop at Registration only if you have not been pre-registered   Inform your  fi they do not stay that they will be asked by the staff to leave a phone number where they can be reached   Be available to be reached before surgery   In the event the operating room schedule changes, you may be asked to come in earlier or later than expected    *It is important to tell your doctor and others involved in your health care if you are taking or have been taking any non-prescription drugs, vitamins, minerals, herbals or other nutritional supplements   Any of these may interact with some food or medicines and cause a reaction

## 2021-05-17 ENCOUNTER — TELEPHONE (OUTPATIENT)
Dept: OBGYN CLINIC | Facility: HOSPITAL | Age: 71
End: 2021-05-17

## 2021-05-17 ENCOUNTER — APPOINTMENT (OUTPATIENT)
Dept: RADIOLOGY | Facility: HOSPITAL | Age: 71
End: 2021-05-17
Payer: COMMERCIAL

## 2021-05-17 ENCOUNTER — ANESTHESIA (OUTPATIENT)
Dept: PERIOP | Facility: HOSPITAL | Age: 71
End: 2021-05-17
Payer: COMMERCIAL

## 2021-05-17 ENCOUNTER — ANESTHESIA EVENT (OUTPATIENT)
Dept: PERIOP | Facility: HOSPITAL | Age: 71
End: 2021-05-17
Payer: COMMERCIAL

## 2021-05-17 ENCOUNTER — HOSPITAL ENCOUNTER (OUTPATIENT)
Facility: HOSPITAL | Age: 71
Setting detail: OUTPATIENT SURGERY
Discharge: HOME | End: 2021-05-17
Attending: ORTHOPAEDIC SURGERY | Admitting: ORTHOPAEDIC SURGERY
Payer: COMMERCIAL

## 2021-05-17 VITALS
DIASTOLIC BLOOD PRESSURE: 74 MMHG | HEART RATE: 79 BPM | OXYGEN SATURATION: 94 % | WEIGHT: 231 LBS | RESPIRATION RATE: 20 BRPM | BODY MASS INDEX: 38.44 KG/M2 | TEMPERATURE: 98.4 F | SYSTOLIC BLOOD PRESSURE: 149 MMHG

## 2021-05-17 DIAGNOSIS — S82.851A CLOSED TRIMALLEOLAR FRACTURE OF RIGHT ANKLE, INITIAL ENCOUNTER: Primary | ICD-10-CM

## 2021-05-17 PROCEDURE — C1713 ANCHOR/SCREW BN/BN,TIS/BN: HCPCS | Performed by: ORTHOPAEDIC SURGERY

## 2021-05-17 PROCEDURE — C9290 INJ, BUPIVACAINE LIPOSOME: HCPCS | Performed by: PHYSICIAN ASSISTANT

## 2021-05-17 PROCEDURE — 73610 X-RAY EXAM OF ANKLE: CPT

## 2021-05-17 PROCEDURE — 27792 TREATMENT OF ANKLE FRACTURE: CPT | Performed by: ORTHOPAEDIC SURGERY

## 2021-05-17 PROCEDURE — 27695 REPAIR OF ANKLE LIGAMENT: CPT | Performed by: ORTHOPAEDIC SURGERY

## 2021-05-17 PROCEDURE — 27829 TREAT LOWER LEG JOINT: CPT | Performed by: ORTHOPAEDIC SURGERY

## 2021-05-17 DEVICE — LOCKING SCREW
Type: IMPLANTABLE DEVICE | Site: ANKLE | Status: FUNCTIONAL
Brand: VARIAX

## 2021-05-17 DEVICE — BONE SCREW
Type: IMPLANTABLE DEVICE | Site: ANKLE | Status: FUNCTIONAL
Brand: VARIAX

## 2021-05-17 DEVICE — DISTAL LATERAL FIBULA PLATE, 4 HOLE
Type: IMPLANTABLE DEVICE | Site: ANKLE | Status: FUNCTIONAL
Brand: VARIAX

## 2021-05-17 DEVICE — SONICANCHOR KIT
Type: IMPLANTABLE DEVICE | Site: ANKLE | Status: FUNCTIONAL
Brand: SONICANCHOR

## 2021-05-17 DEVICE — K-LESS T-ROPE W/DRV, SYN REPR, TI
Type: IMPLANTABLE DEVICE | Site: ANKLE | Status: FUNCTIONAL
Brand: ARTHREX®

## 2021-05-17 RX ORDER — ASPIRIN 325 MG
325 TABLET, DELAYED RELEASE (ENTERIC COATED) ORAL 2 TIMES DAILY
Qty: 84 TABLET | Refills: 0 | Status: SHIPPED | OUTPATIENT
Start: 2021-05-17 | End: 2021-11-12 | Stop reason: ALTCHOICE

## 2021-05-17 RX ORDER — BUPIVACAINE HYDROCHLORIDE 5 MG/ML
INJECTION, SOLUTION PERINEURAL
Status: COMPLETED | OUTPATIENT
Start: 2021-05-17 | End: 2021-05-17

## 2021-05-17 RX ORDER — HYDROMORPHONE HCL/PF 1 MG/ML
0.5 SYRINGE (ML) INJECTION
Status: DISCONTINUED | OUTPATIENT
Start: 2021-05-17 | End: 2021-05-17 | Stop reason: HOSPADM

## 2021-05-17 RX ORDER — ONDANSETRON 2 MG/ML
INJECTION INTRAMUSCULAR; INTRAVENOUS AS NEEDED
Status: DISCONTINUED | OUTPATIENT
Start: 2021-05-17 | End: 2021-05-17

## 2021-05-17 RX ORDER — PROPOFOL 10 MG/ML
INJECTION, EMULSION INTRAVENOUS AS NEEDED
Status: DISCONTINUED | OUTPATIENT
Start: 2021-05-17 | End: 2021-05-17

## 2021-05-17 RX ORDER — ONDANSETRON 4 MG/1
4 TABLET, FILM COATED ORAL EVERY 8 HOURS PRN
Qty: 20 TABLET | Refills: 0 | Status: SHIPPED | OUTPATIENT
Start: 2021-05-17

## 2021-05-17 RX ORDER — OXYCODONE HYDROCHLORIDE 5 MG/1
5 TABLET ORAL EVERY 4 HOURS PRN
Status: DISCONTINUED | OUTPATIENT
Start: 2021-05-17 | End: 2021-05-17 | Stop reason: HOSPADM

## 2021-05-17 RX ORDER — BUPIVACAINE HYDROCHLORIDE 2.5 MG/ML
INJECTION, SOLUTION EPIDURAL; INFILTRATION; INTRACAUDAL AS NEEDED
Status: DISCONTINUED | OUTPATIENT
Start: 2021-05-17 | End: 2021-05-17 | Stop reason: HOSPADM

## 2021-05-17 RX ORDER — FENTANYL CITRATE/PF 50 MCG/ML
25 SYRINGE (ML) INJECTION
Status: DISCONTINUED | OUTPATIENT
Start: 2021-05-17 | End: 2021-05-17 | Stop reason: HOSPADM

## 2021-05-17 RX ORDER — CHLORHEXIDINE GLUCONATE 0.12 MG/ML
15 RINSE ORAL ONCE
Status: DISCONTINUED | OUTPATIENT
Start: 2021-05-17 | End: 2021-05-17 | Stop reason: HOSPADM

## 2021-05-17 RX ORDER — DEXAMETHASONE SODIUM PHOSPHATE 10 MG/ML
INJECTION, SOLUTION INTRAMUSCULAR; INTRAVENOUS AS NEEDED
Status: DISCONTINUED | OUTPATIENT
Start: 2021-05-17 | End: 2021-05-17

## 2021-05-17 RX ORDER — OXYCODONE HYDROCHLORIDE 5 MG/1
5 TABLET ORAL EVERY 4 HOURS PRN
Qty: 30 TABLET | Refills: 0 | Status: SHIPPED | OUTPATIENT
Start: 2021-05-17 | End: 2021-05-22

## 2021-05-17 RX ORDER — MIDAZOLAM HYDROCHLORIDE 2 MG/2ML
INJECTION, SOLUTION INTRAMUSCULAR; INTRAVENOUS
Status: COMPLETED | OUTPATIENT
Start: 2021-05-17 | End: 2021-05-17

## 2021-05-17 RX ORDER — CEFAZOLIN SODIUM 2 G/50ML
2000 SOLUTION INTRAVENOUS ONCE
Status: COMPLETED | OUTPATIENT
Start: 2021-05-17 | End: 2021-05-17

## 2021-05-17 RX ORDER — FENTANYL CITRATE 50 UG/ML
INJECTION, SOLUTION INTRAMUSCULAR; INTRAVENOUS
Status: COMPLETED | OUTPATIENT
Start: 2021-05-17 | End: 2021-05-17

## 2021-05-17 RX ORDER — FENTANYL CITRATE 50 UG/ML
INJECTION, SOLUTION INTRAMUSCULAR; INTRAVENOUS AS NEEDED
Status: DISCONTINUED | OUTPATIENT
Start: 2021-05-17 | End: 2021-05-17

## 2021-05-17 RX ORDER — SODIUM CHLORIDE, SODIUM LACTATE, POTASSIUM CHLORIDE, CALCIUM CHLORIDE 600; 310; 30; 20 MG/100ML; MG/100ML; MG/100ML; MG/100ML
INJECTION, SOLUTION INTRAVENOUS CONTINUOUS PRN
Status: DISCONTINUED | OUTPATIENT
Start: 2021-05-17 | End: 2021-05-17

## 2021-05-17 RX ORDER — CHLORHEXIDINE GLUCONATE 4 G/100ML
SOLUTION TOPICAL DAILY PRN
Status: DISCONTINUED | OUTPATIENT
Start: 2021-05-17 | End: 2021-05-17 | Stop reason: HOSPADM

## 2021-05-17 RX ORDER — VANCOMYCIN HYDROCHLORIDE 1 G/20ML
INJECTION, POWDER, LYOPHILIZED, FOR SOLUTION INTRAVENOUS AS NEEDED
Status: DISCONTINUED | OUTPATIENT
Start: 2021-05-17 | End: 2021-05-17 | Stop reason: HOSPADM

## 2021-05-17 RX ADMIN — FENTANYL CITRATE 25 MCG: 50 INJECTION, SOLUTION INTRAMUSCULAR; INTRAVENOUS at 12:55

## 2021-05-17 RX ADMIN — PROPOFOL 160 MG: 10 INJECTION, EMULSION INTRAVENOUS at 11:08

## 2021-05-17 RX ADMIN — SODIUM CHLORIDE, SODIUM LACTATE, POTASSIUM CHLORIDE, AND CALCIUM CHLORIDE: .6; .31; .03; .02 INJECTION, SOLUTION INTRAVENOUS at 10:51

## 2021-05-17 RX ADMIN — HYDROMORPHONE HYDROCHLORIDE 0.5 MG: 1 INJECTION, SOLUTION INTRAMUSCULAR; INTRAVENOUS; SUBCUTANEOUS at 13:10

## 2021-05-17 RX ADMIN — CEFAZOLIN SODIUM 2000 MG: 2 SOLUTION INTRAVENOUS at 11:01

## 2021-05-17 RX ADMIN — FENTANYL CITRATE 25 MCG: 50 INJECTION, SOLUTION INTRAMUSCULAR; INTRAVENOUS at 12:07

## 2021-05-17 RX ADMIN — MIDAZOLAM 2 MG: 1 INJECTION INTRAMUSCULAR; INTRAVENOUS at 09:50

## 2021-05-17 RX ADMIN — FENTANYL CITRATE 25 MCG: 50 INJECTION, SOLUTION INTRAMUSCULAR; INTRAVENOUS at 12:48

## 2021-05-17 RX ADMIN — BUPIVACAINE HYDROCHLORIDE 10 ML: 5 INJECTION, SOLUTION PERINEURAL at 09:50

## 2021-05-17 RX ADMIN — FENTANYL CITRATE 100 MCG: 50 INJECTION, SOLUTION INTRAMUSCULAR; INTRAVENOUS at 09:50

## 2021-05-17 RX ADMIN — ONDANSETRON 4 MG: 2 INJECTION INTRAMUSCULAR; INTRAVENOUS at 12:19

## 2021-05-17 RX ADMIN — OXYCODONE HYDROCHLORIDE 5 MG: 5 TABLET ORAL at 14:25

## 2021-05-17 RX ADMIN — HYDROMORPHONE HYDROCHLORIDE 0.5 MG: 1 INJECTION, SOLUTION INTRAMUSCULAR; INTRAVENOUS; SUBCUTANEOUS at 13:20

## 2021-05-17 RX ADMIN — DEXAMETHASONE SODIUM PHOSPHATE 4 MG: 10 INJECTION, SOLUTION INTRAMUSCULAR; INTRAVENOUS at 11:15

## 2021-05-17 NOTE — ANESTHESIA PROCEDURE NOTES
Peripheral Block    Patient location during procedure: holding area  Start time: 5/17/2021 9:30 AM  Reason for block: at surgeon's request and post-op pain management  Staffing  Performed: anesthesiologist   Preanesthetic Checklist  Completed: patient identified, site marked, surgical consent, pre-op evaluation, timeout performed, IV checked, risks and benefits discussed and monitors and equipment checked  Peripheral Block  Patient position: supine  Prep: Betadine  Patient monitoring: heart rate, cardiac monitor, continuous pulse ox and frequent blood pressure checks  Block type: adductor canal block and popliteal  Laterality: right  Injection technique: single-shot  Procedures: ultrasound guided, Ultrasound guidance required for the procedure to increase accuracy and safety of medication placement and decrease risk of complications   and nerve stimulatorbupivacaine (MARCAINE) 0 5 % perineural infiltration, 10 mL  midazolam (VERSED) 2 mg/2 mL IV, 2 mg  fentaNYL 50 mcg/mL IV, 100 mcg (30ml for pop block, 15ml for ACB)  Needle  Needle type: Stimuplex   Needle gauge: 21 G  Needle length: 10 cm  Needle localization: ultrasound guidance and nerve stimulator  Needle insertion depth: 3 cm  Assessment  Injection assessment: incremental injection, no paresthesia on injection, negative aspiration for heme and local visualized surrounding nerve on ultrasound  Paresthesia pain: none  Heart rate change: no  Slow fractionated injection: yes  Post-procedure:  site cleaned  patient tolerated the procedure well with no immediate complications

## 2021-05-17 NOTE — INTERVAL H&P NOTE
H&P reviewed  After examining the patient I find no changes in the patients condition since the H&P had been written      Vitals:    05/17/21 0824   BP: 153/81   Pulse: 76   Resp: 20   Temp: 97 9 °F (36 6 °C)   SpO2: 98%       Plan for ORIF right ankle

## 2021-05-17 NOTE — OP NOTE
OPERATIVE REPORT  PATIENT NAME: Nathaniel Schaeffer  :  1950  MRN: [id number]  Pt Location:  OR ROOM 03    SURGERY DATE: 2021    Surgeon(s) and Role:     Sincere Frost MD - Primary     * Muna Cifuentes PA-C - Assisting    Preop Diagnosis:  Closed trimalleolar fracture of right ankle, initial encounter [R43 180N]    Post-Op Diagnosis Codes:     * Closed trimalleolar fracture of right ankle, initial encounter [J99 173W]    Procedure(s) (LRB):  OPEN REDUCTION W/ INTERNAL FIXATION (ORIF) ANKLE (Right)    Specimen(s):  * No specimens in log *    Estimated Blood Loss:   25 mL    Drains:  * No LDAs found *    Anesthesia Type:   Choice    Operative Indications:  Closed trimalleolar fracture of right ankle, initial encounter [U62 300G]      Operative Findings:  Consistent with diagnosis  Deltoid ligament rupture  Syndesmosis injury    Complications:   None    Procedure and Technique:  1  Open reduction and internal fixation of lateral malleolus ankle fracture  2  Open repair of deltoid ligament  3  Open reduction internal fixation of distal tibiofibular syndesmosis  4  Intraoperative fluoroscopic interpretation, greater than one hour, no radiologist  5  Placement into short leg nonweightbearing plaster splint        OPERATIVE REPORT:    After informed consent and preoperative medical clearance were obtained, the patient was taken to the preoperative holding area  Allergies were properly assessed and patient was given appropriate perioperative IV antibiotics without complication  Please see the anesthesia report for details of the anesthesia administered  Patient was taken the operating room placed supine on the operating room table  All bony prominences and skin were well padded, airway maintained, genitalia protected and brachial plexi/ulnar nerves at the elbows protected  Patient's operative lower extremity was prepped and draped in sterile fashion       The operative lower extremity was exsanguinated with esmarch elastic bandage and a thigh tourniquet was utilized  A time-out was performed with the attending surgeon in the room  A longitudinal incision was made laterally over the fibula taking care to protect the superficial peroneal nerve which traversed the field proximally  Careful soft tissue dissection was performed  With minimal periosteal stripping, the fibular fracture fracture was exposed  The hematoma was evacuated  The fractures were reduced into an anatomic position  Temporary reduction was maintained with a clamp and intraoperative fluoroscopy used in multiple planes confirmed appropriate reduction and length of the fibula  Under fluoroscopic guidance, lag screws (as listed below under IMPLANTS) were placed across the fracture site with satisfactory purchase  A fibular plate (as listed below under IMPLANTS) was then placed and secured after fluoroscopy confirmed appropriate position the plate  Screws had satisfactory purchase distally and proximally  Intraoperative fluoroscopy confirmed appropriate alignment and length of the fibula with appropriate hardware position  Dorsiflexion and external rotation stress testing demonstrated medial clear space widening and instability to the syndesmosis  Under fluoroscopic guidance, we drilled and placed and Arthrex titanium tightrope, at the appropriate level and trajectory, to reduce and fixate the unstable syndesmosis  With the ankle held in neutral dorsiflexion and slight hindfoot inversion to close down the ankle mortise, we cinched down the tightrope to maintain this reduction  Fluoroscopy demonstrated no further instability to the syndesmosis but there was talar tilt indicating a complete rupture of the deltoid ligament  We then made a Medial longitudinal incision, Saphenous nerve protected, we identified the deltoid ligament injury    We cleaned off the origin at the medial malleolus and drilled two holes for anchor placement and then repaired the deltoid primarily back to the medial malleolus  Thorough irrigation was performed using copious amounts of sterile saline  The tourniquet was released and meticulous hemostasis was obtained  The wound was closed in layers with Vicryl suture for the deeper layers and nylon suture for the skin for a tension-less closure  There was no blanching at the skin margins after closure  Sterile dressings were applied with the ankle in neutral position and over-abundant padding with a posterior/sugar tong splint was applied  Satisfactory capillary refill remained in all toes  Patient tolerated the procedure well  There were no complications  He was taken to the recovery room in stable condition  DISPOSITION:   1  Patient is stable to PACU  2  Non-weightbearing to lower extremity for 6 weeks  3  Pain control  4  DVT prophylaxis with ASA 325mg BID    5  Follow-up at 3 weeks with suture removal if appropriate      I was present for the entire procedure    Patient Disposition:  PACU     SIGNATURE: Cole Rosales MD  DATE: May 17, 2021  TIME: 12:43 PM

## 2021-05-17 NOTE — ANESTHESIA POSTPROCEDURE EVALUATION
Post-Op Assessment Note    CV Status:  Stable  Pain Score: 0    Pain management: adequate     Mental Status:  Alert and awake   Hydration Status:  Euvolemic   PONV Controlled:  Controlled   Airway Patency:  Patent      Post Op Vitals Reviewed: Yes      Staff: Anesthesiologist   Reason for prolonged intubation > 24 hours:  NaReason for prolonged intubation > 48 hours:  Na      No complications documented      BP      Temp      Pulse     Resp      SpO2

## 2021-05-17 NOTE — DISCHARGE INSTRUCTIONS
KATIE Alejandro  Attending, [address] [place] [address] Office Phone: [phone] ? Fax: [phone]  [facility] Office Phone: [phone] ? [facility]:[phone]    : Manda Vicente) [place], [place]     Surgery Coordinators [facility]: Janeth Donato, [address] [facility], [phone]  Surgery Coordinator Foster:  Amber Sharma, [phone]                                                             Leonardo Lopez, [phone]  www [email or web address]   PRE-OPERATIVE AND POST-OPERATIVE INSTRUCTIONS    General Information:   Typical post operative visits are at the following intervals:  2-3 weeks post surgery, 6 weeks post surgery, 3 months post surgery, 6 months post surgery, and then on a yearly basis  However, this may change based on Dr Mitzy Carrillo recommendation   #1 post-operative rule for foot/ankle surgery:  ONCE YOU ARE OUT OF YOUR CAST AND/OR REMOVABLE BOOT, SWELLING MAY PERSIST FOR MANY MONTHS  YOU MIGHT ALSO EXPERIENCE A BLUISH DISCOLORATION OF YOUR LEG  THIS IS NORMAL AND PART OF THE USUAL POSTOPERATIVE EXPERIENCE    DO NOT WAIT UNTIL YOUR BLOCK WEARS OFF TO TAKE YOUR PAIN MEDICATION  IT TAKES A FEW DOSES OF THE PAIN MEDICATION TO REACH A THERAPEUTIC LEVEL  TAKE A TABLET PROACTIVELY BEFORE YOU HAVE ANY PAIN AND AGAIN 4 HOURS LATER SO WHEN THE BLOCK WEARS OFF, YOU ARE NOT CAUGHT OFF GUARD  SMOKING:   Smoking results in incomplete healing of fractures (broken bones) and joints that my have been fused  Smoking and nicotine also prevents the growth of bone into ankle replacements and bone healing  It also slows the healing of muscles and skin (soft tissue)  Therefore, please do not have surgery if you continue to smoke  We reserve the right to cancel your surgery if we suspect that you are smoking  DO NOT use nicorette gum or other patches    Please find an alternative method to quit smoking before your surgery and do not restart after surgery to allow for healing  THREE RULES:    1  After surgery you will most likely be given the instructions KEEP YOUR TOES ABOVE YOUR NOSE    This means that you MUST have your feet elevated higher than your heart  Keeping your toes above your nose helps to heal the muscles and skin (soft tissues) by reducing swelling in your leg  This position also helps to prevent infection, and is very important in avoiding deep venous thrombosis (blood clots)  2  In order to keep the blood circulating in your legs and in order to avoid deep vein   thrombosis (blood clots), we ask patients to GET UP ONCE AN HOUR during the day  This means you should at least cross the room and come back  It does not mean you have to be up for long periods of time  In most cases we will not have people immediately put any weight on their operated part  This is important to prevent loosening of metal or other devices holding the bones together  It also prevents irritation of the soft tissues which can lead to prolonged healing  When we say get up once an hour, please walk, hop or move with an assisted device  This is important! 3  Do not do any excessive walking during the first few days after surgery  Recovering from surgery is a full-time task for the patient  Postoperative care is important to avoid irritating the skin incision, which can lead to infection  Please do not plan activities or go out of town for several weeks after surgery  AFTER YOUR SURGERY:   Bleeding through the bandage almost always occurs  Do not let this alarm you  Simply add more gauze or a towel, call us, and come in for a dressing change  If you think it is excessive, contact us immediately or go to the local emergency room   Do not get the bandage wet  Showering is possible with plastic protectors  Be very careful, as the bathroom can be wet and slippery    If you do get your dressing wet, it should be changed immediately  Please contact us   ONCE YOUR ARE OUT OF YOUR CAST AND/OR REMOVABLE BOOT, SWELLING MAY PERSIST FOR MANY MONTHS  THERE WILL ALSO BE A BLUISH DISCOLORATION OF YOUR LEG FOR MONTHS  THIS IS NORMAL AND PART OF THE USUAL POSTOPERATIVE EXPERIENCE  WEARING COMPRESSION HOSE (ELASTIC STOCKINGS) CAN HELP AVOID SOME OF THIS SWELLING   Ice the area 20 minutes every hour once the nerve block wears off  If you are in a cast or a splint, you may need to leave the ice on longer than 20 minutes in order to feel any benefits  DRESSING:   The purpose of the surgical dressing is to keep your wound and the surgical site protected from the environment  Most dressings contain splints, which help to hold your foot and ankle in a corrected position, and also allow the surgical site to heal properly  If you have a drain in place, this will need to be removed in 1 day after surgery  The time for the drain to be pulled will be written on your discharge instruction sheet  CAST  INSTRUCTIONS:  You may or may not get a cast following surgery  If you do, pay close attention to the following:     After application of a splint or cast, it is very important to elevate your leg for 24 to 72 hours  The injured area should be elevated well above the heart  Remember Toes above your Nose  Rest and elevation greatly reduce pain and speed the healing process by minimizing early swelling      CALL YOUR DOCTORS OFFICE OR VISIT LOCATION EMERGENCY ROOM IF YOU HAVE ANY OF THE FOLLOWING:     Significant increased pain, which may be caused by swelling (Strict elevation will alleviate this)   Numbness and tingling in your hand or foot, which may be caused by too much pressure on the nerves (There is always some numbness after surgery due to nerve blocks)   Burning and stinging, which may be caused by too much pressure on the skin   Excessive swelling below the cast, which may mean the cast is slowing your blood circulation   Loss of active movement of toes, which request an urgent evaluation   Loss of capillary refill  Pinch the tip of toes and blayne the skin  Release pressure and if the skin does not return pink then call the office immediately  DO NOT GET YOUR CAST WET  Bacteria thrive in moist dark areas  We do not want this  If your cast becomes wet, return to the office and we will apply another one  PAIN AFTER SURGERY:  Narcotic pain medication can and will depress your respiratory system if taken in excess  The goal of pain management with narcotics is to be comfortable not pain free  If you take enough narcotics to be pain free then you run the risk of stopping breathing  If this happens, call 911 immediately!  Pain in the heel is often  caused by pressure from the weight of your foot on the bed  Make sure your heel is suspended off the bed by keeping a pillow underneath your calf not your knee  Medications: You will be given narcotic pain medication  Do NOT drive while taking narcotic medications  Medications such as Darvocet, Percocet, Vicoden or Tylenol #3, also contain acetaminophen (Tylenol)  Do not take acetaminophen or Tylenol from home when taking theses medications  When you fill your prescription, you may ask the pharmacist if your pain medication has acetaminophen/Tylenol in it  It is okay to take Tylenol with Oxycontin/Oxycodone  Unless you are allergic to aspirin or currently taking a blood thinner, Dr Iva Keen patients are requested to take one 325 mg aspirin every 12 hours until you are back to walking normally after surgery (This can be up to 6 weeks)  Ecotrin (Enteric-coated aspirin) is more sensitive to the stomach and we recommend purchasing this instead of regular aspirin to minimize the risk of stomach irritation  Narcotic medications commonly cause nausea  Taking them with food will decrease this side effect   If you are having extreme nausea, please contact us for an alternative medication or for something that can be taken with this medication to decrease the nausea  Also, narcotic medications frequently cause constipation  An increase of fiber, fruits and vegetables in your diet may alleviate this problem, or if necessary, you may use an over-the-counter medication such as senekot, colace, or Fibercon for constipation problems  You should resume all medications you were taking prior to the surgery unless otherwise specified  If you had fracture surgery, bony surgery like an osteotomy or fusion, or a surgery that requires bone healing, you are advised to take Vitamin D and Calcium to improve healing potential   Vitamin D3 4000 units/day and Calcium 1200mg/day  These are over the counter medications so please pick them up at the pharmacy when you are picking up your prescriptions  Activity:   Because of your recent foot surgery, your activity level will decrease  You will need to elevate your foot ABOVE the level of your heart for a minimum of four days  The length of time necessary for the swelling to go down, and for your wounds to heal properly depends greatly on your efforts here  Elevation is extremely important to avoid compromising the blood supply to your foot  Remember when your foot is down it will swell, which will increase pain and slow healing  Wiggle your toes frequently if possible  If you go home with a regional block, (a type of anesthesia) the foot and leg will be numb  Think of ways to get into your house and around the house until the block wears off  Keep in mind that it may be a legal issue if you drive while in a cast or splint, especially when the splint is on the right foot  You may call the Department of Motor Vehicles to schedule a road test if you have adaptive equipment applied to your car     The amount of weight you are allowed to bear on your foot will be written on your discharge sheet filled out at the time of surgery  The following is an explanation of the possibilities:     Non-weight bearing: You are to put NO weight whatsoever on your foot  When using crutches or a walker, your foot should not touch the ground, except when you are standing  Then, it may rest on the ground  If you are to be non-weight bearing, and you are not compliant, you could compromise the surgery  Some of our patients have been requesting prescriptions for a roll-a-bout knee scooter  BC and other insurances have been denying these claims, and you may either have to rent one or pay out of pocket to purchase one

## 2021-05-17 NOTE — TELEPHONE ENCOUNTER
Patient sees [facility]    Patients wife called yelling & upset regarding medications that she was supposed to get at [facility], she onluy received the aspirin    I  Let her know the other medication and she was going back into [facility]

## 2021-05-17 NOTE — ANESTHESIA PREPROCEDURE EVALUATION
Procedure:  OPEN REDUCTION W/ INTERNAL FIXATION (ORIF) ANKLE (Right Ankle)    Relevant Problems   CARDIO   (+) Essential hypertension   (+) Hyperlipidemia      HEMATOLOGY   (+) Thrombocytopenia (HCC)      NEURO/PSYCH   (+) Anxiety   (+) Recurrent major depressive disorder (HCC)      Other   (+) Splenomegaly        Physical Exam    Airway    Mallampati score: II  TM Distance: >3 FB  Neck ROM: full     Dental       Cardiovascular  Rhythm: regular, Rate: normal, Cardiovascular exam normal    Pulmonary  Pulmonary exam normal Breath sounds clear to auscultation,     Other Findings        Anesthesia Plan  ASA Score- 2     Anesthesia Type- regional and general with ASA Monitors  Additional Monitors:   Airway Plan: LMA  Comment: Benefits and risks of planned anesthetic discussed with patient, and agrees to proceed  I, Dr Gretel Kraft, the attending anesthesiologist, have personally seen and evaluated the patient prior to anesthetic care  I have reviewed the preanesthetic record, and other medical records if appropriate to the anesthetic care  If a CRNA is involved in the case, I have reviewed the CRNA assessment, if present, and agree  The patient is in a suitable condition to proceed with my formulated anesthetic plan          Plan Factors-Exercise tolerance (METS): >4 METS  Chart reviewed  Patient is a current smoker (vaped briefly but has stopped)  Patient did not smoke on day of surgery  Induction- intravenous  Postoperative Plan- Plan for postoperative opioid use  Informed Consent- Anesthetic plan and risks discussed with patient  I personally reviewed this patient with the CRNA  Discussed and agreed on the Anesthesia Plan with the GIANCARLO Haile

## 2021-05-19 ENCOUNTER — VBI (OUTPATIENT)
Dept: ADMINISTRATIVE | Facility: OTHER | Age: 71
End: 2021-05-19

## 2021-05-21 ENCOUNTER — IMMUNIZATIONS (OUTPATIENT)
Dept: FAMILY MEDICINE CLINIC | Facility: HOSPITAL | Age: 71
End: 2021-05-21

## 2021-05-21 DIAGNOSIS — Z23 ENCOUNTER FOR IMMUNIZATION: Primary | ICD-10-CM

## 2021-05-21 PROCEDURE — 91300 SARS-COV-2 / COVID-19 MRNA VACCINE (PFIZER-BIONTECH) 30 MCG: CPT

## 2021-05-21 PROCEDURE — 0002A SARS-COV-2 / COVID-19 MRNA VACCINE (PFIZER-BIONTECH) 30 MCG: CPT

## 2021-06-07 ENCOUNTER — RA CDI HCC (OUTPATIENT)
Dept: OTHER | Facility: HOSPITAL | Age: 71
End: 2021-06-07

## 2021-06-07 NOTE — PROGRESS NOTES
[facility] 75  coding opportunities          Chart reviewed, no opportunity found: CHART REVIEWED, NO OPPORTUNITY FOUND              Patients insurance company: [employer] (Medicare Advantage and Commercial)      HCC's have been reported for 2021 no other opportunity found

## 2021-06-08 ENCOUNTER — OFFICE VISIT (OUTPATIENT)
Dept: OBGYN CLINIC | Facility: CLINIC | Age: 71
End: 2021-06-08

## 2021-06-08 VITALS
HEART RATE: 76 BPM | RESPIRATION RATE: 20 BRPM | SYSTOLIC BLOOD PRESSURE: 110 MMHG | BODY MASS INDEX: 38.49 KG/M2 | WEIGHT: 231 LBS | DIASTOLIC BLOOD PRESSURE: 67 MMHG | HEIGHT: 65 IN

## 2021-06-08 DIAGNOSIS — S82.851A CLOSED TRIMALLEOLAR FRACTURE OF RIGHT ANKLE, INITIAL ENCOUNTER: Primary | ICD-10-CM

## 2021-06-08 PROCEDURE — 99024 POSTOP FOLLOW-UP VISIT: CPT | Performed by: ORTHOPAEDIC SURGERY

## 2021-06-08 PROCEDURE — 3008F BODY MASS INDEX DOCD: CPT | Performed by: ORTHOPAEDIC SURGERY

## 2021-06-08 NOTE — PATIENT INSTRUCTIONS
Continue aspirin/lovenox for blood clot prevention  May shower, do not soak in a tub/pool/ocean/etc for another 4 weeks  Begin PT  Scar massage- pea sized amount of lotion, massage into scar for 5 minutes each day  Compression stocking (Knee high, 20-30mm Hg) to be worn at all times while awake  Recommend taking the following supplements: Vitamin D3- 4000 units per day and Calcium 1200 mg per day  This will help with bone healing  Wear the boot at all times except when showering and in PT, even to sleep at night

## 2021-06-08 NOTE — PROGRESS NOTES
KATIE Rabago  Attending, Orthopaedic Surgery  Foot and Ankle  [facility]      ORTHOPAEDIC FOOT AND ANKLE POST-OP VISIT     Procedure:     ORIF right fibula with syndesmosis fixation and deltoid repair       Date of surgery:   5/17/21      PLAN  1  Weightbearing Status- NWB operative extremity  2  DVT prophylaxis-  BID  3  Continue to elevate 23hrs/day getting up 1x per hour to prevent a blood clot  4  Pain control- OTC pain medication  5  RTC in 3 week(s)  6  Xrays needed next visit - yes Weightbearing Ankle    History of Present Illness:   Chief Complaint:   Chief Complaint   Patient presents with    Right Ankle - Post-op     Morro Silva  is a 66 y o  male who is being seen for post-operative visit for the above procedure  Pain is well controlled and the patient has successfully transitioned to OTC pain medicines  he is taking ASA 325mg BID for DVT prophylaxis  Patient has been NWB in a CAM boot  Review of Systems:  General- denies fever/chills  Respiratory- denies cough or SOB  Cardio- denies chest pain or palpitations  GI- denies abdominal pain  Musculoskeletal- Negative except noted above  Skin- denies rashes or wounds    Physical Exam:   /67 (BP Location: Left arm, Patient Position: Sitting)   Pulse 76   Resp 20   Ht 5' 5" (1 651 m)   Wt 105 kg (231 lb)   BMI 38 44 kg/m²   General/Constitutional: No apparent distress: well-nourished and well developed  Eyes: normal ocular motion  Lymphatic: No appreciable lymphadenopathy  Respiratory: Non-labored breathing  Vascular: No edema, swelling or tenderness, except as noted in detailed exam   Integumentary: No impressive skin lesions present, except as noted in detailed exam   Neuro: No ataxia or tremors noted  Psych: Normal mood and affect, oriented to person, place and time  Appropriate affect  Musculoskeletal: Normal, except as noted in detailed exam and in HPI      Examination    right Incision Clean, dry, intact  Sutures Removed this visit    Ecchymosis none    Swelling mild    Sensation Intact to light touch throughout sural, saphenous, superficial peroneal, deep peroneal and medial/lateral plantar nerve distributions  [place]isco-Cielo 5 07 filament (10g) testing deferred  Cardiovascular Brisk capillary refill < 2 seconds,intact DP and PT pulses    Special Tests None      Imaging Studies:   No new imaging        James R Lachman, MD  Foot & Ankle Surgery   Department of [address] [place] [address]      I personally performed the service  Angelina Cinnamon Lachman, MD

## 2021-06-09 ENCOUNTER — EVALUATION (OUTPATIENT)
Dept: PHYSICAL THERAPY | Facility: REHABILITATION | Age: 71
End: 2021-06-09
Payer: COMMERCIAL

## 2021-06-09 DIAGNOSIS — S82.851A CLOSED TRIMALLEOLAR FRACTURE OF RIGHT ANKLE, INITIAL ENCOUNTER: Primary | ICD-10-CM

## 2021-06-09 PROCEDURE — 97161 PT EVAL LOW COMPLEX 20 MIN: CPT

## 2021-06-09 PROCEDURE — 97110 THERAPEUTIC EXERCISES: CPT

## 2021-06-09 NOTE — PROGRESS NOTES
PT Evaluation     Today's date: 2021  Patient name: Capri Valentine  : 1950  MRN: [id number]  Referring provider: Rubén Smith MD  Dx:   Encounter Diagnosis     ICD-10-CM    1  Closed trimalleolar fracture of right ankle, initial encounter  S82 851A Ambulatory referral to Physical Therapy       Start Time: 0800  Stop Time: 0845  Total time in clinic (min): 45 minutes    Assessment  Assessment details: Zhanna Serrano is a 67 yo male referred to PT for R ankle tri-malleolar fracture  ORIF placed 21  Patient is currently NWBing on the RLE  Pain is well controlled at this time  Moderate swelling in ankle  Mild warmth but no redness in R ankle  No red flags present on evaluation  Limited PROM in R ankle in all directions  Impaired strength and balance on the RLE  Discussed POC and given HEP  Patient demonstrates awareness of current precautions on RLE  Impairments: abnormal gait, abnormal or restricted ROM, activity intolerance, impaired balance, impaired physical strength, lacks appropriate home exercise program, pain with function and weight-bearing intolerance    Symptom irritability: moderateBarriers to therapy: none  Understanding of Dx/Px/POC: good   Prognosis: good    Goals  Short Term Goals (Week 4):  1  Decreased pain by 50%  2  Improve ROM by 10 degrees in all directions  3  Improve strength by 1/2 measure in all directions      Long Term Goals (8 weeks):  1  <2/10 pain when ambulating short community distances  2  FOTO score >50  3   Fully independent with HEP by discharge      Plan  Patient would benefit from: skilled physical therapy  Planned modality interventions: low level laser therapy, cryotherapy and electrical stimulation/Russian stimulation  Planned therapy interventions: joint mobilization, IADL retraining, manual therapy, massage, ADL retraining, activity modification, balance/weight bearing training, neuromuscular re-education, patient education, muscle pump exercises, stretching, strengthening, therapeutic activities, therapeutic exercise, flexibility, functional ROM exercises, gait training, graded activity, graded exercise, graded motor and home exercise program  Frequency: 2x week  Duration in weeks: 8  Treatment plan discussed with: patient        Subjective Evaluation    History of Present Illness  Date of surgery: 2021  Mechanism of injury: surgery  Mechanism of injury: Injury occurred 21 where patient slipped outside on R foot  Immediately noticed deformity  Surgery performed 21  No numbness/tingling in the R foot  Pain is well controlled and is not currently experiencing any pain at this time  Patient started use of CAM boot 21  Patient currently is NWBing on the RLE  Denies any fevers, chills, n/v, and chest pain  Pain  Current pain ratin  At worst pain ratin  Relieving factors: medications, relaxation, rest and ice  Exacerbated by: NWBing on the RLE  Progression: improved    Patient Goals  Patient goals for therapy: decreased edema, decreased pain, improved balance, increased strength, return to sport/leisure activities, independence with ADLs/IADLs and increased motion  Patient goal: Return to PLOF, walking        Objective     Passive Range of Motion     Right Ankle/Foot    Dorsiflexion (ke): 0 degrees   Plantar flexion: 10 degrees   Inversion: 5 degrees   Eversion: 5 degrees   Great toe extension: 20 degrees     General Comments:       Ankle/Foot Comments   Incision sites healing well    Moderate swelling in R ankle    No redness, slight warmth    No tenderness to palpation    Light Touch Sensation intact             Precautions: Tri-Malleolar fx DOS 21 NWBing      Manuals             PROM R ankle nv                                                   Neuro Re-Ed             Std Marches nv            Std Hip 3 way nv                                                                             Ther Ex             Ankle Pumps 2x10            Ankle Circles 2x10            Toe Crunches 2x10            Gastroc Stretch 10"x10            PF Stretch 10"x10            LAQ nv            SL Hip Abd nv                         Ther Activity                                       Gait Training                                       Modalities

## 2021-06-14 ENCOUNTER — OFFICE VISIT (OUTPATIENT)
Dept: PHYSICAL THERAPY | Facility: REHABILITATION | Age: 71
End: 2021-06-14
Payer: COMMERCIAL

## 2021-06-14 DIAGNOSIS — S82.851A CLOSED TRIMALLEOLAR FRACTURE OF RIGHT ANKLE, INITIAL ENCOUNTER: Primary | ICD-10-CM

## 2021-06-14 PROCEDURE — 97110 THERAPEUTIC EXERCISES: CPT

## 2021-06-14 NOTE — PROGRESS NOTES
Daily Note     Today's date: 2021  Patient name: Isabella Snow  : 1950  MRN: [id number]  Referring provider: Shanta Hampton MD  Dx:   Encounter Diagnosis     ICD-10-CM    1  Closed trimalleolar fracture of right ankle, initial encounter  S82 851A        Start Time: 1630  Stop Time: 1715  Total time in clinic (min): 45 minutes   Patient 1 on 1 with  PT from [id number]  Subjective: Patient reports compliance with HEP  States no pain with activities      Objective: See treatment diary below      Assessment: Reiterated NWBing precautions on the RLE  Discussed POC again  Challenged with gentle ankle AROM  Pain free with all activities  Tolerated treatment well  Patient would benefit from continued PT      Plan: Continue per plan of care        Precautions: Tri-Malleolar fx RLE DOS , NWBing until 6 weeks postop (21)      Manuals            PROM R ankle nv                                                   Neuro Re-Ed             Std Marches nv 2x10           Std Hip 3 way nv 2x10                                                                            Ther Ex             Ankle Pumps 2x10 30x           Ankle Circles 2x10 30x           Toe Crunches 2x10 30x           Gastroc Stretch 10"x10 10"x20           PF Stretch 10"x10 10"x20           LAQ nv 2x10           SL Hip Abd nv                         Ther Activity                                       Gait Training                                       Modalities

## 2021-06-16 ENCOUNTER — OFFICE VISIT (OUTPATIENT)
Dept: PHYSICAL THERAPY | Facility: REHABILITATION | Age: 71
End: 2021-06-16
Payer: COMMERCIAL

## 2021-06-16 DIAGNOSIS — S82.851A CLOSED TRIMALLEOLAR FRACTURE OF RIGHT ANKLE, INITIAL ENCOUNTER: Primary | ICD-10-CM

## 2021-06-16 PROCEDURE — 97110 THERAPEUTIC EXERCISES: CPT

## 2021-06-16 PROCEDURE — 97112 NEUROMUSCULAR REEDUCATION: CPT

## 2021-06-16 PROCEDURE — 97140 MANUAL THERAPY 1/> REGIONS: CPT

## 2021-06-16 NOTE — PROGRESS NOTES
Daily Note     Today's date: 2021  Patient name: Leslie Briggs  : 1950  MRN: [id number]  Referring provider: Kemar Chaparro MD  Dx:   Encounter Diagnosis     ICD-10-CM    1  Closed trimalleolar fracture of right ankle, initial encounter  S82 851A        Start Time: 915  Stop Time: 1000  Total time in clinic (min): 45 minutes    Subjective: Patient reports R ankle is feeling good  Objective: See treatment diary below      Assessment: Limitations in R ankle PROM all directions greatest limitations in inversion and eversion  Tolerated treatment well  Patient would benefit from continued PT      Plan: Continue per plan of care        Precautions: Tri-Malleolar fx RLE DOS , NWBing until 6 weeks postop (21)      Manuals           PROM R ankle nv  MT                                                 Neuro Re-Ed             Std Marches nv 2x10 2x10          Std Hip 3 way nv 2x10 2x10                                                                           Ther Ex             Ankle Pumps 2x10 30x 1'          Ankle Circles 2x10 30x 1'          Toe Crunches 2x10 30x 1'          Gastroc Stretch 10"x10 10"x20 10"x30          PF Stretch 10"x10 10"x20 10"x30          LAQ nv 2x10 2x15          SL Hip Abd nv                         Ther Activity                                       Gait Training                                       Modalities

## 2021-06-18 DIAGNOSIS — R12 HEART BURN: ICD-10-CM

## 2021-06-21 ENCOUNTER — OFFICE VISIT (OUTPATIENT)
Dept: PHYSICAL THERAPY | Facility: REHABILITATION | Age: 71
End: 2021-06-21
Payer: COMMERCIAL

## 2021-06-21 DIAGNOSIS — S82.851A CLOSED TRIMALLEOLAR FRACTURE OF RIGHT ANKLE, INITIAL ENCOUNTER: Primary | ICD-10-CM

## 2021-06-21 PROCEDURE — 97140 MANUAL THERAPY 1/> REGIONS: CPT

## 2021-06-21 PROCEDURE — 97112 NEUROMUSCULAR REEDUCATION: CPT

## 2021-06-21 PROCEDURE — 97110 THERAPEUTIC EXERCISES: CPT

## 2021-06-21 RX ORDER — FAMOTIDINE 40 MG/1
TABLET, FILM COATED ORAL
Qty: 90 TABLET | Refills: 1 | Status: SHIPPED | OUTPATIENT
Start: 2021-06-21 | End: 2021-12-15 | Stop reason: SDUPTHER

## 2021-06-21 NOTE — PROGRESS NOTES
Daily Note     Today's date: 2021  Patient name: Martín Randolph  : 1950  MRN: [id number]  Referring provider: Thi Calderon MD  Dx:   Encounter Diagnosis     ICD-10-CM    1  Closed trimalleolar fracture of right ankle, initial encounter  S82 851A        Start Time: 0800  Stop Time: 0845  Total time in clinic (min): 45 minutes    Subjective: No new c/o pain or discomfort since last visit  Objective: See treatment diary below      Assessment: Limited PROM in all directions  Slight discomfort in all with PF on wobbleboard  Tolerated treatment well  Patient would benefit from continued PT      Plan: Continue per plan of care        Precautions: Tri-Malleolar fx RLE DOS , NWBing until 6 weeks postop (21)      Manuals          PROM R ankle nv  MN MN                                                Neuro Re-Ed             Std Marches nv 2x10 2x10          Std Hip 3 way nv 2x10 2x10 2x10                                                                          Ther Ex             Ankle Pumps 2x10 30x 1'          Ankle Circles 2x10 30x 1'          Toe Crunches 2x10 30x 1'          Gastroc Stretch 10"x10 10"x20 10"x30 10"x30         PF Stretch 10"x10 10"x20 10"x30 10"x30         LAQ nv 2x10 2x15 2# 2x15         SL Hip Abd nv            Wobbleboard  -AP  -ML   30x each seated 30x each seated         Towel Crunches on slideboard    10x length of towel         Ther Activity                                       Gait Training                                       Modalities

## 2021-06-24 ENCOUNTER — OFFICE VISIT (OUTPATIENT)
Dept: PHYSICAL THERAPY | Facility: REHABILITATION | Age: 71
End: 2021-06-24
Payer: COMMERCIAL

## 2021-06-24 DIAGNOSIS — S82.851A CLOSED TRIMALLEOLAR FRACTURE OF RIGHT ANKLE, INITIAL ENCOUNTER: Primary | ICD-10-CM

## 2021-06-24 PROCEDURE — 97112 NEUROMUSCULAR REEDUCATION: CPT

## 2021-06-24 PROCEDURE — 97140 MANUAL THERAPY 1/> REGIONS: CPT

## 2021-06-24 PROCEDURE — 97110 THERAPEUTIC EXERCISES: CPT

## 2021-06-24 NOTE — PROGRESS NOTES
Daily Note     Today's date: 2021  Patient name: Ian Herrera  : 1950  MRN: [id number]  Referring provider: Riya Renae MD  Dx:   Encounter Diagnosis     ICD-10-CM    1  Closed trimalleolar fracture of right ankle, initial encounter  S82 851A        Start Time: 0800  Stop Time: 0845  Total time in clinic (min): 45 minutes    Subjective: Patient reports foot feels better each week  Objective: See treatment diary below      Assessment: Better tolerance for PROM today, minimal discomfort in foot with plantarflexion  Tolerated treatment well  Patient would benefit from continued PT      Plan: Continue per plan of care        Precautions: Tri-Malleolar fx RLE DOS , NWBing until 6 weeks postop (21)      Manuals         PROM R ankle nv  TX TX TX                                               Neuro Re-Ed             Std Marches nv 2x10 2x10          Std Hip 3 way nv 2x10 2x10 2x10 np resume        Towel Crunches on slideboard    10x length of towel 10x length of towel        Wobbleboard  -AP  -ML   30x each seated 30x each seated 30x each seated                                               Ther Ex             Ankle Pumps 2x10 30x 1'          Ankle Circles 2x10 30x 1'          Toe Crunches 2x10 30x 1'          Gastroc Stretch 10"x10 10"x20 10"x30 10"x30 10"x30        PF Stretch 10"x10 10"x20 10"x30 10"x30 10"x30        LAQ nv 2x10 2x15 2# 2x15 np resume        SL Hip Abd nv            Bike     10' Level 1        Ther Activity                                       Gait Training                                       Modalities

## 2021-06-28 ENCOUNTER — OFFICE VISIT (OUTPATIENT)
Dept: PHYSICAL THERAPY | Facility: REHABILITATION | Age: 71
End: 2021-06-28
Payer: COMMERCIAL

## 2021-06-28 DIAGNOSIS — S82.851A CLOSED TRIMALLEOLAR FRACTURE OF RIGHT ANKLE, INITIAL ENCOUNTER: Primary | ICD-10-CM

## 2021-06-28 PROCEDURE — 97140 MANUAL THERAPY 1/> REGIONS: CPT

## 2021-06-28 PROCEDURE — 97110 THERAPEUTIC EXERCISES: CPT

## 2021-06-28 PROCEDURE — 97112 NEUROMUSCULAR REEDUCATION: CPT

## 2021-06-28 NOTE — PROGRESS NOTES
Daily Note     Today's date: 2021  Patient name: Nathaniel Schaeffer  : 1950  MRN: [id number]  Referring provider: Teodora Martinez MD  Dx:   Encounter Diagnosis     ICD-10-CM    1  Closed trimalleolar fracture of right ankle, initial encounter  S82 851A        Start Time: 0800  Stop Time: 0845  Total time in clinic (min): 45 minutes    Subjective: No complaints of pain since last visit  Objective: See treatment diary below      Assessment: R ankle PROM slowly improving  PROM limited in all directions greatest limitations inversion and plantarflexion  Patient scheduled for f/u with MD 21  Will initiate weight bearing on the RLE when appropriate  Patient would benefit from continued PT       Plan: Continue per plan of care        Precautions: Tri-Malleolar fx RLE DOS , NWBing until 6 weeks postop (21)      Manuals        PROM R ankle nv  MO MO MO MO                                              Neuro Re-Ed             Std Marches nv 2x10 2x10          Std Hip 3 way nv 2x10 2x10 2x10 np resume        Towel Crunches on slideboard    10x length of towel 10x length of towel 12x length of towel       Wobbleboard  -AP  -ML   30x each seated 30x each seated 30x each seated 50x seated                                              Ther Ex             Ankle Pumps 2x10 30x 1'          Ankle Circles 2x10 30x 1'          Toe Crunches 2x10 30x 1'          Gastroc Stretch 10"x10 10"x20 10"x30 10"x30 10"x30 10"x30       PF Stretch 10"x10 10"x20 10"x30 10"x30 10"x30 10"x30       LAQ nv 2x10 2x15 2# 2x15 np resume        SL Hip Abd nv            Bike     10' Level 1 10' Level 1       Ther Activity                                       Gait Training                                       Modalities

## 2021-06-29 ENCOUNTER — APPOINTMENT (OUTPATIENT)
Dept: RADIOLOGY | Facility: AMBULARY SURGERY CENTER | Age: 71
End: 2021-06-29
Attending: ORTHOPAEDIC SURGERY
Payer: COMMERCIAL

## 2021-06-29 ENCOUNTER — OFFICE VISIT (OUTPATIENT)
Dept: OBGYN CLINIC | Facility: CLINIC | Age: 71
End: 2021-06-29

## 2021-06-29 VITALS — BODY MASS INDEX: 38.49 KG/M2 | RESPIRATION RATE: 18 BRPM | WEIGHT: 231 LBS | HEIGHT: 65 IN

## 2021-06-29 DIAGNOSIS — S82.851A CLOSED TRIMALLEOLAR FRACTURE OF RIGHT ANKLE, INITIAL ENCOUNTER: Primary | ICD-10-CM

## 2021-06-29 DIAGNOSIS — S82.851A CLOSED TRIMALLEOLAR FRACTURE OF RIGHT ANKLE, INITIAL ENCOUNTER: ICD-10-CM

## 2021-06-29 PROCEDURE — 99024 POSTOP FOLLOW-UP VISIT: CPT | Performed by: ORTHOPAEDIC SURGERY

## 2021-06-29 PROCEDURE — 73610 X-RAY EXAM OF ANKLE: CPT

## 2021-06-29 NOTE — PROGRESS NOTES
KATIE Winslow  Attending, Orthopaedic Surgery  Foot and Ankle  [facility]      ORTHOPAEDIC FOOT AND ANKLE POST-OP VISIT     Procedure:     ORIF right fibula with syndesmosis fixation and deltoid repair       Date of surgery:   5/17/21      PLAN  1  Weightbearing Status- PWB operative extremity  2  DVT prophylaxis- ASA 325mg BID  3  Continue to elevate 23hrs/day getting up 1x per hour to prevent a blood clot  4  Pain control- OTC pain medication  5  RTC in 6 weeks  6  Xrays needed next visit - yes Weightbearing Ankle    History of Present Illness:   Chief Complaint:   Chief Complaint   Patient presents with    Right Ankle - Pain, Follow-up     Malu Colmenares  is a 66 y o  male who is being seen for post-operative visit for the above procedure  Pain is well controlled and the patient has successfully transitioned to OTC pain medicines  he is taking ASA 325mg BID for DVT prophylaxis  Patient has been NWB in a CAM boot  Review of Systems:  General- denies fever/chills  Respiratory- denies cough or SOB  Cardio- denies chest pain or palpitations  GI- denies abdominal pain  Musculoskeletal- Negative except noted above  Skin- denies rashes or wounds    Physical Exam:   Resp 18   Ht 5' 5" (1 651 m)   Wt 105 kg (231 lb)   BMI 38 44 kg/m²   General/Constitutional: No apparent distress: well-nourished and well developed  Eyes: normal ocular motion  Lymphatic: No appreciable lymphadenopathy  Respiratory: Non-labored breathing  Vascular: No edema, swelling or tenderness, except as noted in detailed exam   Integumentary: No impressive skin lesions present, except as noted in detailed exam   Neuro: No ataxia or tremors noted  Psych: Normal mood and affect, oriented to person, place and time  Appropriate affect  Musculoskeletal: Normal, except as noted in detailed exam and in HPI  Examination    right        Incision Clean, dry, intact  Sutures Previously removed  Ecchymosis none    Swelling Mild    Sensation Intact to light touch throughout sural, saphenous, superficial peroneal, deep peroneal and medial/lateral plantar nerve distributions  [place]-Cielo 5 07 filament (10g) testing deferred  Cardiovascular Brisk capillary refill < 2 seconds,intact DP and PT pulses    Special Tests None      Imaging Studies:   3 views of the right ankle were taken, reviewed and interpreted independently that demonstrate stable and intact hardware without evidence of displacement or loosening  Reviewed by me personally  Toby Bougie Lachman, MD  Foot & Ankle Surgery   Department of [address] [place] [address]      I personally performed the service  Toby Bougie Lachman, MD    Scribe Attestation    I,:  Lorraine Strange MA am acting as a scribe while in the presence of the attending physician :       I,:  Joshua Verma MD personally performed the services described in this documentation    as scribed in my presence :

## 2021-06-29 NOTE — PATIENT INSTRUCTIONS
4 days of partial weight bearing 50%  Then, 4 days of partial weight bearing 75%  Then, 4 days of partial weight bearing 90%  Then full weight bearing in the boot and wean your crutches/rolling walker  Then 2 weeks of weightbearing as tolerated in the CAM boot  You may begin weaning your boot and transitioning to a sneaker (7/28)  It is important to do this gradually to avoid aggravating the healing process  1  7/28, you may come out of the boot into a sneaker for 2 hours  2  7/29, you may come out of the boot into a sneaker for 4 hours,  3  The next day, you may come out of the boot into a sneaker for 6 hours  4  Continue this (adding 2 hours per day) as you tolerate  For example, if you do 6 hours out of the boot into a sneaker and your foot swells more than usual at night and it is difficult to control the discomfort, do not advance to 8 hours the next day, stay at 6 hours until you are able to tolerate it  Elevation, Ice and tylenol and staying off of it at night will be important to aide in this transition out of the boot  Swelling and soreness are normal as you begin to do more with the injured leg  May DC aspirin/lovenox, no longer needed  May shower, do not soak in a tub/pool/ocean/etc for another 1 weeks  Continue PT  Scar massage- pea sized amount of lotion, massage into scar for 5 minutes each day  Compression stocking (Knee high, 20-30mm Hg) to be worn at all times while awake  Recommend taking the following supplements: Vitamin D 4000 units per day and Calcium 1200 mg per day  This will help with bone healing

## 2021-06-30 ENCOUNTER — OFFICE VISIT (OUTPATIENT)
Dept: FAMILY MEDICINE CLINIC | Facility: CLINIC | Age: 71
End: 2021-06-30
Payer: COMMERCIAL

## 2021-06-30 ENCOUNTER — OFFICE VISIT (OUTPATIENT)
Dept: PHYSICAL THERAPY | Facility: REHABILITATION | Age: 71
End: 2021-06-30
Payer: COMMERCIAL

## 2021-06-30 VITALS
TEMPERATURE: 96.7 F | HEART RATE: 77 BPM | OXYGEN SATURATION: 98 % | BODY MASS INDEX: 37.79 KG/M2 | DIASTOLIC BLOOD PRESSURE: 88 MMHG | HEIGHT: 65 IN | SYSTOLIC BLOOD PRESSURE: 146 MMHG | WEIGHT: 226.8 LBS

## 2021-06-30 DIAGNOSIS — I10 ESSENTIAL HYPERTENSION: ICD-10-CM

## 2021-06-30 DIAGNOSIS — S82.851A CLOSED TRIMALLEOLAR FRACTURE OF RIGHT ANKLE, INITIAL ENCOUNTER: Primary | ICD-10-CM

## 2021-06-30 DIAGNOSIS — F41.9 ANXIETY: ICD-10-CM

## 2021-06-30 LAB
BASOPHILS # BLD AUTO: 42 CELLS/UL (ref 0–200)
BASOPHILS NFR BLD AUTO: 0.6 %
EOSINOPHIL # BLD AUTO: 147 CELLS/UL (ref 15–500)
EOSINOPHIL NFR BLD AUTO: 2.1 %
ERYTHROCYTE [DISTWIDTH] IN BLOOD BY AUTOMATED COUNT: 13 % (ref 11–15)
HCT VFR BLD AUTO: 48.3 % (ref 38.5–50)
HGB BLD-MCNC: 16.8 G/DL (ref 13.2–17.1)
LYMPHOCYTES # BLD AUTO: 1232 CELLS/UL (ref 850–3900)
LYMPHOCYTES NFR BLD AUTO: 17.6 %
MCH RBC QN AUTO: 33.3 PG (ref 27–33)
MCHC RBC AUTO-ENTMCNC: 34.8 G/DL (ref 32–36)
MCV RBC AUTO: 95.6 FL (ref 80–100)
MONOCYTES # BLD AUTO: 882 CELLS/UL (ref 200–950)
MONOCYTES NFR BLD AUTO: 12.6 %
NEUTROPHILS # BLD AUTO: 4697 CELLS/UL (ref 1500–7800)
NEUTROPHILS NFR BLD AUTO: 67.1 %
PLATELET # BLD AUTO: 100 THOUSAND/UL (ref 140–400)
PMV BLD REES-ECKER: 11.8 FL (ref 7.5–12.5)
RBC # BLD AUTO: 5.05 MILLION/UL (ref 4.2–5.8)
WBC # BLD AUTO: 7 THOUSAND/UL (ref 3.8–10.8)

## 2021-06-30 PROCEDURE — 99214 OFFICE O/P EST MOD 30 MIN: CPT | Performed by: FAMILY MEDICINE

## 2021-06-30 PROCEDURE — 1036F TOBACCO NON-USER: CPT | Performed by: FAMILY MEDICINE

## 2021-06-30 PROCEDURE — 97110 THERAPEUTIC EXERCISES: CPT

## 2021-06-30 PROCEDURE — 3077F SYST BP >= 140 MM HG: CPT | Performed by: FAMILY MEDICINE

## 2021-06-30 PROCEDURE — 1160F RVW MEDS BY RX/DR IN RCRD: CPT | Performed by: FAMILY MEDICINE

## 2021-06-30 PROCEDURE — 97140 MANUAL THERAPY 1/> REGIONS: CPT

## 2021-06-30 PROCEDURE — 97112 NEUROMUSCULAR REEDUCATION: CPT

## 2021-06-30 PROCEDURE — 3008F BODY MASS INDEX DOCD: CPT | Performed by: FAMILY MEDICINE

## 2021-06-30 PROCEDURE — 3079F DIAST BP 80-89 MM HG: CPT | Performed by: FAMILY MEDICINE

## 2021-06-30 RX ORDER — ALPRAZOLAM 0.25 MG/1
0.25 TABLET ORAL 4 TIMES DAILY
Qty: 120 TABLET | Refills: 5 | Status: SHIPPED | OUTPATIENT
Start: 2021-06-30 | End: 2021-12-15 | Stop reason: SDUPTHER

## 2021-06-30 RX ORDER — LORAZEPAM 1 MG/1
1 TABLET ORAL 3 TIMES DAILY
Qty: 90 TABLET | Refills: 5 | Status: SHIPPED | OUTPATIENT
Start: 2021-06-30 | End: 2021-12-15 | Stop reason: SDUPTHER

## 2021-06-30 RX ORDER — HYDROCHLOROTHIAZIDE 25 MG/1
25 TABLET ORAL DAILY
Qty: 90 TABLET | Refills: 1 | Status: SHIPPED | OUTPATIENT
Start: 2021-06-30 | End: 2021-12-15 | Stop reason: SDUPTHER

## 2021-06-30 NOTE — PROGRESS NOTES
Chief Complaint   Patient presents with    Follow-up     talk about ankle surgery      Assessment/Plan:  Caution with falling  The patient has a history of falls  I did complete a risk assessment for falls  A plan of care for falls was documented  Diagnoses and all orders for this visit:    Anxiety  -     ALPRAZolam (XANAX) 0 25 mg tablet; Take 1 tablet (0 25 mg total) by mouth 4 (four) times a day  -     LORazepam (ATIVAN) 1 mg tablet; Take 1 tablet (1 mg total) by mouth 3 (three) times a day    Essential hypertension  -     hydrochlorothiazide (HYDRODIURIL) 25 mg tablet; Take 1 tablet (25 mg total) by mouth daily          Subjective:      Patient ID: Renny Bowser  is a 66 y o  male  Refill Med's  At 50 % weight bearing  Fractured ankle - slipped on a rock  The following portions of the patient's history were reviewed and updated as appropriate: allergies, current medications, past medical history, past social history, past surgical history and problem list   I have spent 25 minutes with Patient  today in which greater than 50% of this time was spent in counseling/coordination of care regarding Risks and benefits of tx options, Intructions for management, Risk factor reductions and Impressions  Review of Systems   Constitutional: Negative  HENT: Negative  Eyes: Negative  Respiratory: Negative  Cardiovascular: Negative  Gastrointestinal: Negative  Genitourinary: Negative  Musculoskeletal: Negative  Skin: Negative  Neurological: Negative  Psychiatric/Behavioral: Negative            Objective:      /88 (BP Location: Left arm, Patient Position: Sitting, Cuff Size: Standard)   Pulse 77   Temp (!) 96 7 °F (35 9 °C) (Temporal)   Ht 5' 5" (1 651 m)   Wt 103 kg (226 lb 12 8 oz)   SpO2 98%   BMI 37 74 kg/m²       Current Outpatient Medications:     ALPRAZolam (XANAX) 0 25 mg tablet, Take 1 tablet (0 25 mg total) by mouth 4 (four) times a day, Disp: 120 tablet, Rfl: 5    Ascorbic Acid (VITAMIN C) 100 MG CHEW, Chew 1 tablet daily, Disp: , Rfl:     atenolol (TENORMIN) 100 mg tablet, Take 1 tablet (100 mg total) by mouth 2 (two) times a day, Disp: 180 tablet, Rfl: 3    calcium citrate-vitamin d (CALCIUM CITRATE CHEWY BITE) 500-500 MG-UNIT CHEW chewable tablet, Chew, Disp: , Rfl:     Cholecalciferol (VITAMIN D3) 1000 units CAPS, Take by mouth, Disp: , Rfl:     doxycycline (DORYX) 150 MG EC tablet, Take 150 mg by mouth daily, Disp: , Rfl: 6    econazole nitrate 1 % cream, Apply topically daily, Disp: , Rfl:     escitalopram (LEXAPRO) 10 mg tablet, Take 1 tablet (10 mg total) by mouth daily, Disp: 90 tablet, Rfl: 3    famotidine (PEPCID) 40 MG tablet, TAKE 1 TABLET BY MOUTH EVERY DAY, Disp: 90 tablet, Rfl: 1    hydrochlorothiazide (HYDRODIURIL) 25 mg tablet, Take 1 tablet (25 mg total) by mouth daily, Disp: 90 tablet, Rfl: 1    hydrocortisone 2 5 % cream, Apply topically, Disp: , Rfl:     ketoconazole (NIZORAL) 2 % cream, APPLY TO SKIN TWICE DAILY AS DIRECTED, Disp: , Rfl: 2    LORazepam (ATIVAN) 1 mg tablet, Take 1 tablet (1 mg total) by mouth 3 (three) times a day, Disp: 90 tablet, Rfl: 5    losartan (COZAAR) 100 MG tablet, Take 1 tablet (100 mg total) by mouth daily, Disp: 90 tablet, Rfl: 1    Magnesium 250 MG TABS, Take by mouth, Disp: , Rfl:     metroNIDAZOLE (METROGEL) 1 % gel, Apply topically, Disp: , Rfl:     Multiple Vitamin (MULTIVITAMINS PO), Take 1 tablet by mouth daily, Disp: , Rfl:     potassium chloride (Klor-Con 10) 10 mEq tablet, Take 1 tablet (10 mEq total) by mouth daily, Disp: 90 tablet, Rfl: 3    aspirin (ECOTRIN) 325 mg EC tablet, Take 1 tablet (325 mg total) by mouth 2 (two) times a day (Patient not taking: Reported on 6/30/2021), Disp: 84 tablet, Rfl: 0    ILEVRO 0 3 % SUSP, INSTILL 1 DROP INTO RIGHT EYE EVERY DAY STARTING DAY OF SURGERY FOR 28 DAYS, Disp: , Rfl: 1    ondansetron (ZOFRAN) 4 mg tablet, Take 1 tablet (4 mg total) by mouth every 8 (eight) hours as needed for nausea or vomiting (Patient not taking: Reported on 6/30/2021), Disp: 20 tablet, Rfl: 0    Allergies   Allergen Reactions    Morphine Hallucinations       Past Surgical History:   Procedure Laterality Date    APPENDECTOMY      1998    LYMPHADENECTOMY      PARATHYROIDECTOMY      PA OPEN TX TRIMALLEOLAR ANKLE FX W/O FIX PST LIP Right 5/17/2021    Procedure: OPEN REDUCTION W/ INTERNAL FIXATION (ORIF) ANKLE;  Surgeon: Kirk Hernandez MD;  Location:  MAIN OR;  Service: Orthopedics    TOOTH EXTRACTION            Physical Exam  Constitutional:       Appearance: Normal appearance  He is well-developed  HENT:      Head: Normocephalic and atraumatic  Right Ear: Tympanic membrane, ear canal and external ear normal       Left Ear: Tympanic membrane, ear canal and external ear normal       Nose: Nose normal       Mouth/Throat:      Mouth: Mucous membranes are moist       Pharynx: Oropharynx is clear  Eyes:      Conjunctiva/sclera: Conjunctivae normal       Pupils: Pupils are equal, round, and reactive to light  Cardiovascular:      Rate and Rhythm: Normal rate and regular rhythm  Heart sounds: Normal heart sounds  Pulmonary:      Effort: Pulmonary effort is normal       Breath sounds: Normal breath sounds  Musculoskeletal:      Cervical back: Normal range of motion and neck supple  Skin:     General: Skin is warm and dry  Neurological:      General: No focal deficit present  Mental Status: He is alert and oriented to person, place, and time  Deep Tendon Reflexes: Reflexes are normal and symmetric  Psychiatric:         Mood and Affect: Mood normal          Behavior: Behavior normal          Thought Content:  Thought content normal          Judgment: Judgment normal

## 2021-06-30 NOTE — PROGRESS NOTES
Daily Note     Today's date: 2021  Patient name: Malcolm Murphy  : 1950  MRN: [id number]  Referring provider: Sarthak Lozano MD  Dx:   Encounter Diagnosis     ICD-10-CM    1  Closed trimalleolar fracture of right ankle, initial encounter  S82 851A        Start Time: 0800  Stop Time: 0850  Total time in clinic (min): 50 minutes    Subjective: Patient reports he saw MD yesterday, x-rays looked good and he was given clearance for PWB  Objective: See treatment diary below      Assessment: Tolerated initiation of PWBing well today with slight discomfort in RLE but no pain  Added weight shifts to HEP  Will progress PWBing per MD plan  Plan: Continue per plan of care  Precautions: Tri-Malleolar fx RLE DOS , follow weight bearing schedule    PWB at 50% until 7/3/21  PWB at 75% until 21  PWB at 90% until 21  FWB in boot after 21 wean assistive devices as needed   Wean boot to sneaker 21      Manuals       PROM R ankle nv  NC NC NC NC NC                                             Neuro Re-Ed             Std Marches nv 2x10 2x10          Std Hip 3 way nv 2x10 2x10 2x10 np resume  HEP      Towel Crunches on slideboard    10x length of towel 10x length of towel 12x length of towel       Wobbleboard  -AP  -ML   30x each seated 30x each seated 30x each seated 50x seated 50x seated each                                             Ther Ex             Ankle Pumps 2x10 30x 1'          Ankle Circles 2x10 30x 1'          Toe Crunches 2x10 30x 1'          Gastroc Stretch 10"x10 10"x20 10"x30 10"x30 10"x30 10"x30       PF Stretch 10"x10 10"x20 10"x30 10"x30 10"x30 10"x30       LAQ nv 2x10 2x15 2# 2x15 np resume        SL Hip Abd nv            Biodex Weight Shifts       50% 1' x4      Bike     10' Level 1 10' Level 1 10' level 1      Ther Activity                                       Gait Training             Walking        300 feet with use of RW 50% PWB                   Modalities

## 2021-07-06 ENCOUNTER — OFFICE VISIT (OUTPATIENT)
Dept: PHYSICAL THERAPY | Facility: REHABILITATION | Age: 71
End: 2021-07-06
Payer: COMMERCIAL

## 2021-07-06 DIAGNOSIS — S82.851A CLOSED TRIMALLEOLAR FRACTURE OF RIGHT ANKLE, INITIAL ENCOUNTER: Primary | ICD-10-CM

## 2021-07-06 PROCEDURE — 97112 NEUROMUSCULAR REEDUCATION: CPT

## 2021-07-06 PROCEDURE — 97110 THERAPEUTIC EXERCISES: CPT

## 2021-07-06 PROCEDURE — 97140 MANUAL THERAPY 1/> REGIONS: CPT

## 2021-07-06 NOTE — PROGRESS NOTES
Daily Note     Today's date: 2021  Patient name: Katharine Vann  : 1950  MRN: [id number]  Referring provider: Basilio Harman MD  Dx:   Encounter Diagnosis     ICD-10-CM    1  Closed trimalleolar fracture of right ankle, initial encounter  S82 851A        Start Time: 0800  Stop Time: 0845  Total time in clinic (min): 45 minutes    Subjective: Patient reports compliance with 50% PWBing at home  States he did not have any increased pain over the weekend with the initiation of [address]  Objective: See treatment diary below      Assessment: R ankle PROM limited all directions but improving  Tolerated progression to 75% PWBing today with slight discomfort without pain  Discussed slowly increasing PWBing to 75% at home, patient verbalized agreement  Tolerated treatment well  Patient would benefit from continued PT      Plan: Continue per plan of care  Precautions: Tri-Malleolar fx RLE DOS , follow weight bearing schedule    PWB at 50% until 7/3/21  PWB at 75% until 21  PWB at 90% until 21  FWB in boot after 21 wean assistive devices as needed   Wean boot to sneaker 21      Manuals      PROM R ankle nv  OH OH OH OH OH OH                                            Neuro Re-Ed             Std Marches nv 2x10 2x10          Std Hip 3 way nv 2x10 2x10 2x10 np resume  HEP      Towel Crunches on slideboard    10x length of towel 10x length of towel 12x length of towel       Wobbleboard  -AP  -ML   30x each seated 30x each seated 30x each seated 50x seated 50x seated each 50x seated each                                            Ther Ex             Ankle Pumps 2x10 30x 1'          Ankle Circles 2x10 30x 1'          Toe Crunches 2x10 30x 1'          Gastroc Stretch 10"x10 10"x20 10"x30 10"x30 10"x30 10"x30       PF Stretch 10"x10 10"x20 10"x30 10"x30 10"x30 10"x30       LAQ nv 2x10 2x15 2# 2x15 np resume        SL Hip Abd nv Seated HR/TR        2x10     Biodex Weight Shifts       50% 1' x4 75% 1' x4     Bike     10' Level 1 10' Level 1 10' level 1 10' level 1     Ther Activity                                       Gait Training             Walking        300 feet with use of RW 50%  feet with use of RW 75% PWB                  Modalities

## 2021-07-08 ENCOUNTER — OFFICE VISIT (OUTPATIENT)
Dept: PHYSICAL THERAPY | Facility: REHABILITATION | Age: 71
End: 2021-07-08
Payer: COMMERCIAL

## 2021-07-08 DIAGNOSIS — S82.851A CLOSED TRIMALLEOLAR FRACTURE OF RIGHT ANKLE, INITIAL ENCOUNTER: Primary | ICD-10-CM

## 2021-07-08 PROCEDURE — 97140 MANUAL THERAPY 1/> REGIONS: CPT

## 2021-07-08 PROCEDURE — 97110 THERAPEUTIC EXERCISES: CPT

## 2021-07-08 PROCEDURE — 97112 NEUROMUSCULAR REEDUCATION: CPT

## 2021-07-08 PROCEDURE — 97116 GAIT TRAINING THERAPY: CPT

## 2021-07-08 NOTE — PROGRESS NOTES
Daily Note     Today's date: 2021  Patient name: Unique Jean Baptiste  : 1950  MRN: [id number]  Referring provider: Zeina Box MD  Dx:   Encounter Diagnosis     ICD-10-CM    1  Closed trimalleolar fracture of right ankle, initial encounter  S82 851A        Start Time: 0800  Stop Time: 0845  Total time in clinic (min): 45 minutes    Subjective: Patient reports no increased pain with weight-bearing progressions  Objective: See treatment diary below      Assessment: Patient tolerating weight-bearing progressions without increased pain with slight discomfort/uncomfortable feeling in the RLE  Per MD protocol, patient was progressed to 90% weight-bearing today  Instructed patient to slowly increase the amount of time spent at higher percentages of weightbearing for tolerance       Plan: Continue per plan of care  Precautions: Tri-Malleolar fx RLE DOS , follow weight bearing schedule    PWB at 50% until 7/3/21  PWB at 75% until 21  PWB at 90% until 21  FWB in boot after 21 wean assistive devices as needed   Wean boot to sneaker 21      Manuals     PROM R ankle nv  NM NM NM NM NM NM NM                                           Neuro Re-Ed             Std Marches nv 2x10 2x10          Std Hip 3 way nv 2x10 2x10 2x10 np resume  HEP      Towel Crunches on slideboard    10x length of towel 10x length of towel 12x length of towel       Wobbleboard  -AP  -ML   30x each seated 30x each seated 30x each seated 50x seated 50x seated each 50x seated each 50x seated each                                           Ther Ex             Ankle Pumps 2x10 30x 1'          Ankle Circles 2x10 30x 1'          Toe Crunches 2x10 30x 1'          Gastroc Stretch 10"x10 10"x20 10"x30 10"x30 10"x30 10"x30       PF Stretch 10"x10 10"x20 10"x30 10"x30 10"x30 10"x30       LAQ nv 2x10 2x15 2# 2x15 np resume        SL Hip Abd nv            Seated HR/TR 2x10 2x15    Biodex Weight Shifts       50% 1' x4 75% 1' x4 90% 1' x4    Bike     10' Level 1 10' Level 1 10' level 1 10' level 1 10' level 3    Ther Activity                                       Gait Training             Walking        300 feet with use of RW 50%  feet with use of RW 75%  feet with use of RW at 90% PWB    100 feet SPC at 90% PWB                 Modalities

## 2021-07-12 ENCOUNTER — OFFICE VISIT (OUTPATIENT)
Dept: PHYSICAL THERAPY | Facility: REHABILITATION | Age: 71
End: 2021-07-12
Payer: COMMERCIAL

## 2021-07-12 DIAGNOSIS — S82.851A CLOSED TRIMALLEOLAR FRACTURE OF RIGHT ANKLE, INITIAL ENCOUNTER: Primary | ICD-10-CM

## 2021-07-12 PROCEDURE — 97140 MANUAL THERAPY 1/> REGIONS: CPT

## 2021-07-12 PROCEDURE — 97112 NEUROMUSCULAR REEDUCATION: CPT

## 2021-07-12 PROCEDURE — 97110 THERAPEUTIC EXERCISES: CPT

## 2021-07-12 NOTE — PROGRESS NOTES
Daily Note     Today's date: 2021  Patient name: Elan Butts  : 1950  MRN: [id number]  Referring provider: Oralia Hays MD  Dx:   Encounter Diagnosis     ICD-10-CM    1  Closed trimalleolar fracture of right ankle, initial encounter  C95 641L                   Subjective: Patient reports walking around his house w/o AD  Objective: See treatment diary below      Assessment: Patient is progressing well, reports no increased pain during or post tx  Plan: Continue per plan of care  Precautions: Tri-Malleolar fx RLE DOS , follow weight bearing schedule    PWB at 50% until 7/3/21  PWB at 75% until 21  PWB at 90% until 21  FWB in boot after 21 wean assistive devices as needed   Wean boot to sneaker 21      Manuals    PROM R ankle TN TN TN TN TN TN HA                                 Neuro Re-Ed          Std Marches          Std Hip 3 way 2x10 np resume  HEP      Towel Crunches on slideboard 10x length of towel 10x length of towel 12x length of towel       Wobbleboard  -AP  -ML 30x each seated 30x each seated 50x seated 50x seated each 50x seated each 50x seated each 50x seated each                                 Ther Ex          Ankle Pumps          Ankle Circles          Toe Crunches          Gastroc Stretch 10"x30 10"x30 10"x30       PF Stretch 10"x30 10"x30 10"x30       LAQ 2# 2x15 np resume        SL Hip Abd       2x15   Seated HR/TR     2x10 2x15    Biodex Weight Shifts    50% 1' x4 75% 1' x4 90% 1' x4 90% 1' x4   Bike  10' Level 1 10' Level 1 10' level 1 10' level 1 10' level 3 10' Lvl 3   Ther Activity                              Gait Training          Walking     300 feet with use of RW 50%  feet with use of RW 75%  feet with use of RW at 90% PWB    100 feet SPC at 90% PWB              Modalities

## 2021-07-15 ENCOUNTER — OFFICE VISIT (OUTPATIENT)
Dept: PHYSICAL THERAPY | Facility: REHABILITATION | Age: 71
End: 2021-07-15
Payer: COMMERCIAL

## 2021-07-15 DIAGNOSIS — S82.851A CLOSED TRIMALLEOLAR FRACTURE OF RIGHT ANKLE, INITIAL ENCOUNTER: Primary | ICD-10-CM

## 2021-07-15 PROCEDURE — 97112 NEUROMUSCULAR REEDUCATION: CPT

## 2021-07-15 PROCEDURE — 97110 THERAPEUTIC EXERCISES: CPT

## 2021-07-15 PROCEDURE — 97530 THERAPEUTIC ACTIVITIES: CPT

## 2021-07-15 PROCEDURE — 97140 MANUAL THERAPY 1/> REGIONS: CPT

## 2021-07-15 NOTE — PROGRESS NOTES
Daily Note     Today's date: 7/15/2021  Patient name: Emma Hanks  : 1950  MRN: [id number]  Referring provider: Alondra Anton MD  Dx:   Encounter Diagnosis     ICD-10-CM    1  Closed trimalleolar fracture of right ankle, initial encounter  S82 851A        Start Time: 0800  Stop Time: 0850  Total time in clinic (min): 50 minutes    Subjective: Patient reports being on RLE without use of AD without any issues  Objective: See treatment diary below      Assessment: Patient tolerated 100% weight-bearing progression today with mild discomfort in foot  Instructed patient to begin ambulating at home w/o use of AD and use of SPC for community ambulation, patient verbalized understanding  Will focus on progressing patient weight-bearing tolerance with CKC exercises as tolerated  Patient would benefit from continued PT      Plan: Continue per plan of care  Precautions: Tri-Malleolar fx RLE DOS , follow weight bearing schedule    PWB at 50% until 7/3/21  PWB at 75% until 21  PWB at 90% until 21  FWB in boot after 21 wean assistive devices as needed   Wean boot to sneaker 21      Manuals 6/21 6/24 6/28 6/30 7/6 7/8 7/12 7/15     PROM R ankle NV NV NV NV NV NV HA NV                                            Neuro Re-Ed             Std Marches             Std Hip 3 way 2x10 np resume  HEP         Towel Crunches on slideboard 10x length of towel 10x length of towel 12x length of towel          Wobbleboard  -AP  -ML 30x each seated 30x each seated 50x seated 50x seated each 50x seated each 50x seated each 50x seated each 50x seated each     Minisquats        2x15     VG        nv                  Ther Ex             Ankle Pumps             Ankle Circles             Toe Crunches             Gastroc Stretch 10"x30 10"x30 10"x30          PF Stretch 10"x30 10"x30 10"x30          LAQ 2# 2x15 np resume           SL Hip Abd       2x15      Seated HR/TR     2x10 2x15  30x HEP Biodex     WS 50% 1' x4 WS 75% 1' x4 WS 90% 1' x4 WS 90% 1' x4 LOS static x5     Bike  10' Level 1 10' Level 1 10' level 1 10' level 1 10' level 3 10' Lvl 3 10' Lvl 3l     Ther Activity             Step Ups         nv     Side Stepping        nv     URX        nv     Gait Training             Walking     300 feet with use of RW 50%  feet with use of RW 75%  feet with use of RW at 90% PWB    100 feet SPC at 90% PWB  100 feet no AD CGA FWB                  Modalities

## 2021-07-19 ENCOUNTER — OFFICE VISIT (OUTPATIENT)
Dept: PHYSICAL THERAPY | Facility: REHABILITATION | Age: 71
End: 2021-07-19
Payer: COMMERCIAL

## 2021-07-19 DIAGNOSIS — S82.851A CLOSED TRIMALLEOLAR FRACTURE OF RIGHT ANKLE, INITIAL ENCOUNTER: Primary | ICD-10-CM

## 2021-07-19 PROCEDURE — 97116 GAIT TRAINING THERAPY: CPT

## 2021-07-19 PROCEDURE — 97140 MANUAL THERAPY 1/> REGIONS: CPT

## 2021-07-19 PROCEDURE — 97110 THERAPEUTIC EXERCISES: CPT

## 2021-07-19 PROCEDURE — 97112 NEUROMUSCULAR REEDUCATION: CPT

## 2021-07-20 DIAGNOSIS — I10 ESSENTIAL HYPERTENSION: ICD-10-CM

## 2021-07-20 RX ORDER — LOSARTAN POTASSIUM 100 MG/1
TABLET ORAL
Qty: 90 TABLET | Refills: 1 | Status: SHIPPED | OUTPATIENT
Start: 2021-07-20 | End: 2021-12-15 | Stop reason: SDUPTHER

## 2021-07-20 RX ORDER — ATENOLOL 100 MG/1
TABLET ORAL
Qty: 180 TABLET | Refills: 3 | Status: SHIPPED | OUTPATIENT
Start: 2021-07-20 | End: 2022-04-20 | Stop reason: SDUPTHER

## 2021-07-22 ENCOUNTER — OFFICE VISIT (OUTPATIENT)
Dept: PHYSICAL THERAPY | Facility: REHABILITATION | Age: 71
End: 2021-07-22
Payer: COMMERCIAL

## 2021-07-22 DIAGNOSIS — S82.851A CLOSED TRIMALLEOLAR FRACTURE OF RIGHT ANKLE, INITIAL ENCOUNTER: Primary | ICD-10-CM

## 2021-07-22 PROCEDURE — 97110 THERAPEUTIC EXERCISES: CPT

## 2021-07-22 PROCEDURE — 97112 NEUROMUSCULAR REEDUCATION: CPT

## 2021-07-22 PROCEDURE — 97530 THERAPEUTIC ACTIVITIES: CPT

## 2021-07-22 NOTE — PROGRESS NOTES
Daily Note     Today's date: 2021  Patient name: Benito Carrasco  : 1950  MRN: [id number]  Referring provider: Inna Delatorre MD  Dx:   Encounter Diagnosis     ICD-10-CM    1  Closed trimalleolar fracture of right ankle, initial encounter  S82 851A        Start Time: 0800  Stop Time: 0845  Total time in clinic (min): 45 minutes    Subjective: No new complaints since last visit, states he is able to WBAT without increased pain w/o use of AD  Objective: See treatment diary below      Assessment: Patient tolerated ambulation without use of AD in boot pain free  Instructed patient to d/c use of SPC  Patient will bring shoes in next visit  Patient would benefit from continued PT      Plan: Continue per plan of care  Precautions: Tri-Malleolar fx RLE DOS , follow weight bearing schedule    PWB at 50% until 7/3/21  PWB at 75% until 21  PWB at 90% until 21  FWB in boot after 21 wean assistive devices as needed   Wean boot to sneaker 21      Manuals 6/21 6/24 6/28 6/30 7/6 7/8 7/12 7/15 7/19 7/22   PROM R ankle LA LA LA LA LA LA HA LA LA np                                          Neuro Re-Ed             Std Marches             Std Hip 3 way 2x10 np resume  HEP         Towel Crunches on slideboard 10x length of towel 10x length of towel 12x length of towel          Wobbleboard  -AP  -ML 30x each seated 30x each seated 50x seated 50x seated each 50x seated each 50x seated each 50x seated each 50x seated each seated 1'   each seated 2' each   Minisquats        2x15     VG        nv L7 2' L7 4'                Ther Ex             Ankle Pumps             Ankle Circles             Toe Crunches             Gastroc Stretch 10"x30 10"x30 10"x30          PF Stretch 10"x30 10"x30 10"x30          LAQ 2# 2x15 np resume           SL Hip Abd       2x15      Seated HR/TR     2x10 2x15  30x HEP 30x 30x   Biodex     WS 50% 1' x4 WS 75% 1' x4 WS 90% 1' x4 WS 90% 1' x4 LOS static x5 Lv12 x5 Lv11 x5   Bike  10' Level 1 10' Level 1 10' level 1 10' level 1 10' level 3 10' Lvl 3 10' Lvl 3l 10' lvl 3 10' lv 3   Ther Activity             Step Ups         nv 6" step 2x10 8" step 2x10   Side Stepping        nv 6 laps 15 feet 5 laps 20 feet yhb   Phan Carries        nv 25 feet x2 5# 25 feet x3 5#   Gait Training             Walking     300 feet with use of RW 50%  feet with use of RW 75%  feet with use of RW at 90% PWB    100 feet SPC at 90% PWB  100 feet no AD CGA  feet no AD length of session 300 feet no AD length of session                Modalities

## 2021-07-27 ENCOUNTER — OFFICE VISIT (OUTPATIENT)
Dept: PHYSICAL THERAPY | Facility: REHABILITATION | Age: 71
End: 2021-07-27
Payer: COMMERCIAL

## 2021-07-27 DIAGNOSIS — S82.851A CLOSED TRIMALLEOLAR FRACTURE OF RIGHT ANKLE, INITIAL ENCOUNTER: Primary | ICD-10-CM

## 2021-07-27 PROCEDURE — 97112 NEUROMUSCULAR REEDUCATION: CPT

## 2021-07-27 PROCEDURE — 97530 THERAPEUTIC ACTIVITIES: CPT

## 2021-07-27 PROCEDURE — 97110 THERAPEUTIC EXERCISES: CPT

## 2021-07-27 PROCEDURE — 97140 MANUAL THERAPY 1/> REGIONS: CPT

## 2021-07-27 NOTE — PROGRESS NOTES
Daily Note     Today's date: 2021  Patient name: Della Sheikh  : 1950  MRN: [id number]  Referring provider: Johana Arce MD  Dx:   Encounter Diagnosis     ICD-10-CM    1  Closed trimalleolar fracture of right ankle, initial encounter  S82 851A        Start Time: 0845  Stop Time: 0930  Total time in clinic (min): 45 minutes    Subjective: Patient had no new c/o pain or soreness since last visit  Objective: See treatment diary below      Assessment: Patient tolerated FWB in sneaker without any pain today  Discussed shoe wearing schedule with patient, he verbalized understanding  Patient would benefit from continued PT      Plan: Continue per plan of care  Precautions: Tri-Malleolar fx RLE DOS , follow weight bearing schedule    PWB at 50% until 7/3/21  PWB at 75% until 21  PWB at 90% until 21  FWB in boot after 21 wean assistive devices as needed   Wean boot to sneaker 21      Manuals 7/27  6/28 6/30 7/6 7/8 7/12 7/15 7/19 7/22   PROM R ankle VT  VT VT VT VT HA VT VT np   AP glides of talus Gr 2-3            Distraction VT Gr 2-3                         Neuro Re-Ed             Std Marches             Std Hip 3 way    HEP         Towel Crunches on slideboard   12x length of towel          Wobbleboard  -AP  -ML seated 2' each  50x seated 50x seated each 50x seated each 50x seated each 50x seated each 50x seated each seated 1'   each seated 2' each   Minisquats 2x10       2x15     VG L7 4'       nv L7 2' L7 4'                Ther Ex             Ankle Pumps             Ankle Circles             Toe Crunches             Gastroc Stretch   10"x30          PF Stretch   10"x30          LAQ             SL Hip Abd       2x15      Seated HR/TR Std 2x10    2x10 2x15  30x HEP 30x 30x   Biodex  Lvl 9 x5   WS 50% 1' x4 WS 75% 1' x4 WS 90% 1' x4 WS 90% 1' x4 LOS static x5 Lv12 x5 Lv11 x5   Bike 10' lv 3  10' Level 1 10' level 1 10' level 1 10' level 3 10' Lvl 3 10' Lvl 3l 10' lvl 3 10' lv 3   Ther Activity             Step Ups  8" step 2x10       nv 6" step 2x10 8" step 2x10   Side Stepping np resume       nv 6 laps 15 feet 5 laps 20 feet yhb   Phan Carries 25 feet x3 5# each side       nv 25 feet x2 5# 25 feet x3 5#   Gait Training             Walking  300 feet no AD or Cam boot length of session   300 feet with use of RW 50%  feet with use of RW 75%  feet with use of RW at 90% PWB    100 feet SPC at 90% PWB  100 feet no AD CGA  feet no AD length of session 300 feet no AD length of session                Modalities

## 2021-07-29 ENCOUNTER — OFFICE VISIT (OUTPATIENT)
Dept: PHYSICAL THERAPY | Facility: REHABILITATION | Age: 71
End: 2021-07-29
Payer: COMMERCIAL

## 2021-07-29 DIAGNOSIS — S82.851A CLOSED TRIMALLEOLAR FRACTURE OF RIGHT ANKLE, INITIAL ENCOUNTER: Primary | ICD-10-CM

## 2021-07-29 PROCEDURE — 97530 THERAPEUTIC ACTIVITIES: CPT

## 2021-07-29 PROCEDURE — 97110 THERAPEUTIC EXERCISES: CPT

## 2021-07-29 PROCEDURE — 97112 NEUROMUSCULAR REEDUCATION: CPT

## 2021-07-29 NOTE — PROGRESS NOTES
Daily Note     Today's date: 2021  Patient name: Kee Rooney  : 1950  MRN: [id number]  Referring provider: Kirk Zee MD  Dx:   Encounter Diagnosis     ICD-10-CM    1  Closed trimalleolar fracture of right ankle, initial encounter  S82 851A        Start Time: 0800  Stop Time: 0845  Total time in clinic (min): 45 minutes   Patient 1 on 1 with PT from 800-830  Subjective: Patient reports feeling good in boot  Objective: See treatment diary below      Assessment: Tolerating weightbearing activities very well and without any pain  Patient demonstrated fatigue post treatment and would benefit from continued PT      Plan: Continue per plan of care  Precautions: Tri-Malleolar fx RLE DOS , follow weight bearing schedule    PWB at 50% until 7/3/21  PWB at 75% until 21  PWB at 90% until 21  FWB in boot after 21 wean assistive devices as needed   Wean boot to sneaker 21      Manuals 7/27 7/29 6/28 6/30 7/6 7/8 7/12 7/15 7/19 7/22   PROM R ankle SD  SD SD SD SD HA SD SD np   AP glides of talus Gr 2-3            Distraction SD Gr 2-3                         Neuro Re-Ed             Std Marches             Std Hip 3 way    HEP         Towel Crunches on slideboard   12x length of towel          Wobbleboard  -AP  -ML seated 2' each seated 2' each 50x seated 50x seated each 50x seated each 50x seated each 50x seated each 50x seated each seated 1'   each seated 2' each   Minisquats 2x10 2x15      2x15     VG L7 4' L7 4'      nv L7 2' L7 4'                Ther Ex             Ankle Pumps             Ankle Circles             Toe Crunches             Gastroc Stretch   10"x30          PF Stretch   10"x30          LAQ             SL Hip Abd       2x15      Seated HR/TR Std 2x10 Std 3x10   2x10 2x15  30x HEP 30x 30x   Biodex  Lvl 9 x5 l8 8 x5  WS 50% 1' x4 WS 75% 1' x4 WS 90% 1' x4 WS 90% 1' x4 LOS static x5 Lv12 x5 Lv11 x5   Bike 10' lv 3 10' lv3 10' Level 1 10' level 1 10' level 1 10' level 3 10' Lvl 3 10' Lvl 3l 10' lvl 3 10' lv 3   Ther Activity             Step Ups  8" step 2x10 np resume      nv 6" step 2x10 8" step 2x10   Side Stepping np resume np resume      nv 6 laps 15 feet 5 laps 20 feet yhb   Phan Carries 25 feet x3 5# each side 25 feet x3 5# each side      nv 25 feet x2 5# 25 feet x3 5#   Gait Training             Walking  300 feet no AD or Cam boot length of session 300 feet no AD or Cam boot length of session  300 feet with use of RW 50%  feet with use of RW 75%  feet with use of RW at 90% PWB    100 feet SPC at 90% PWB  100 feet no AD CGA  feet no AD length of session 300 feet no AD length of session                Modalities

## 2021-08-02 ENCOUNTER — APPOINTMENT (OUTPATIENT)
Dept: PHYSICAL THERAPY | Facility: REHABILITATION | Age: 71
End: 2021-08-02
Payer: COMMERCIAL

## 2021-08-04 ENCOUNTER — OFFICE VISIT (OUTPATIENT)
Dept: PHYSICAL THERAPY | Facility: REHABILITATION | Age: 71
End: 2021-08-04
Payer: COMMERCIAL

## 2021-08-04 DIAGNOSIS — S82.851A CLOSED TRIMALLEOLAR FRACTURE OF RIGHT ANKLE, INITIAL ENCOUNTER: Primary | ICD-10-CM

## 2021-08-04 PROCEDURE — 97140 MANUAL THERAPY 1/> REGIONS: CPT

## 2021-08-04 PROCEDURE — 97110 THERAPEUTIC EXERCISES: CPT

## 2021-08-04 PROCEDURE — 97112 NEUROMUSCULAR REEDUCATION: CPT

## 2021-08-04 NOTE — PROGRESS NOTES
Daily Note     Today's date: 2021  Patient name: Shala Rojas  : 1950  MRN: [id number]  Referring provider: Paulette Grossman MD  Dx:   Encounter Diagnosis     ICD-10-CM    1  Closed trimalleolar fracture of right ankle, initial encounter  S82 851A        Start Time: 0800  Stop Time: 0845  Total time in clinic (min): 45 minutes    Subjective: Patient had no new c/o of pain in the last few days, states he needed to take it easy today since he was sick       Objective: See treatment diary below      Assessment: Patient weightbearing tolerance is improving very nicely  Patient strength and balance normalizing  Patient would benefit from continued PT      Plan: Continue per plan of care  Precautions: Tri-Malleolar fx RLE DOS , follow weight bearing schedule    PWB at 50% until 7/3/21  PWB at 75% until 21  PWB at 90% until 21  FWB in boot after 21 wean assistive devices as needed   Wean boot to sneaker 21      Manuals  8/4  7/6 7/8 7/12 7/15 7/19 7/22   PROM R ankle NC    NC NC HA NC NC np   AP glides of talus Gr 2-3            Distraction NC Gr 2-3  NC Gr 2-3          Subtalar med and lat glides   NC Gr 2-3          Neuro Re-Ed             Std Marches             Std Hip 3 way             Towel Crunches on slideboard             Wobbleboard  -AP  -ML seated 2' each seated 2' each standing 50x med/lat  30x cw/ccw  50x seated each 50x seated each 50x seated each 50x seated each seated 1'   each seated 2' each   Minisquats 2x10 2x15 2x15     2x15     VG L7 4' L7 4' L7 4' DL      nv L7 2' L7 4'                Ther Ex             Ankle Pumps             Ankle Circles             Toe Crunches             Gastroc Stretch             PF Stretch             LAQ             SL Hip Abd       2x15      Seated HR/TR Std 2x10 Std 3x10 Std 3x10  2x10 2x15  30x HEP 30x 30x   Biodex  Lvl 9 x5 lv 8 x5 L6 x5  WS 75% 1' x4 WS 90% 1' x4 WS 90% 1' x4 LOS static x5 Lv12 x5 Lv11 x5 Bike 10' lv 3 10' lv3 7' lv1  10' level 1 10' level 3 10' Lvl 3 10' Lvl 3l 10' lvl 3 10' lv 3   Ther Activity             Step Ups  8" step 2x10 np resume np resume     nv 6" step 2x10 8" step 2x10   Side Stepping np resume np resume np resume     nv 6 laps 15 feet 5 laps 20 feet yhb   Phan Carries 25 feet x3 5# each side 25 feet x3 5# each side np resume     nv 25 feet x2 5# 25 feet x3 5#   Gait Training             Walking  300 feet no AD or Cam boot length of session 300 feet no AD or Cam boot length of session   420 feet with use of RW 75%  feet with use of RW at 90% PWB    100 feet SPC at 90% PWB  100 feet no AD CGA  feet no AD length of session 300 feet no AD length of session                Modalities

## 2021-08-05 ENCOUNTER — OFFICE VISIT (OUTPATIENT)
Dept: PHYSICAL THERAPY | Facility: REHABILITATION | Age: 71
End: 2021-08-05
Payer: COMMERCIAL

## 2021-08-05 DIAGNOSIS — S82.851A CLOSED TRIMALLEOLAR FRACTURE OF RIGHT ANKLE, INITIAL ENCOUNTER: Primary | ICD-10-CM

## 2021-08-05 PROCEDURE — 97112 NEUROMUSCULAR REEDUCATION: CPT

## 2021-08-05 PROCEDURE — 97110 THERAPEUTIC EXERCISES: CPT

## 2021-08-05 PROCEDURE — 97140 MANUAL THERAPY 1/> REGIONS: CPT

## 2021-08-05 NOTE — PROGRESS NOTES
Daily Note     Today's date: 2021  Patient name: Louie Lazaro  : 1950  MRN: [id number]  Referring provider: Kinga López MD  Dx:   Encounter Diagnosis     ICD-10-CM    1  Closed trimalleolar fracture of right ankle, subsequent encounter  [id number]                   Subjective: Patient reports his right ankle is feeling "very good "  Patient reports he is currently suffering from a cold  Objective: See treatment diary below  Assessment: Addition of forward and lateral step ups onto the bosu tolerated well although patient requires use of the hand rail throughout exercise in order to maintain his balance  No complaints reported throughout right ankle PROM however patient would benefit from continued manual stretching to further improve right ankle ROM and stiffness  Plan: Continue treatment as per PT plan of care         Precautions: Tri-Malleolar fx RLE DOS , follow weight bearing schedule  PWB at 50% until 7/3/21  PWB at 75% until 21  PWB at 90% until 21  FWB in boot after 21 wean assistive devices as needed   Wean boot to sneaker 21      Manuals  8/4 8/  7/8 7/12 7/15 7/19 7/22   PROM R ankle AK   JLW  AK FERNÁNDEZ AK AK np   AP glides of talus Gr 2-3            Distraction AK Gr 2-3  AK Gr 2-3          Subtalar med and lat glides   AK Gr 2-3                       Neuro Re-Ed             Std Marches             Std Hip 3 way             Towel Crunches on slideboard             Wobbleboard  -AP  -ML seated 2' each seated 2' each standing 50x med/lat  30x cw/ccw stand  30 ea  50x seated each 50x seated each 50x seated each seated 1'   each seated 2' each   Minisquats 2x10 2x15 2x15 30    2x15     VG L7 4' L7 4' L7 4' DL  L7 4'    nv L7 2' L7 4'   Step ups onto bosu    fwd/lat   20 ea                      Ther Ex             Ankle Pumps             Ankle Circles             Toe Crunches             Gastroc Stretch             PF Stretch             LAQ SL Hip Abd       2x15      Seated HR/TR Std 2x10 Std 3x10 Std 3x10  stand   30 ea  2x15  30x HEP 30x 30x   Biodex  Lvl 9 x5 lv 8 x5 L6 x5   WS 90% 1' x4 WS 90% 1' x4 LOS static x5 Lv12 x5 Lv11 x5   Bike 10' lv 3 10' lv3 7' lv1  7' lvl 1  10' level 3 10' Lvl 3 10' Lvl 3l 10' lvl 3 10' lv 3   Ther Activity             Step Ups  8" step 2x10 np resume np resume     nv 6" step 2x10 8" step 2x10   Side Stepping np resume np resume np resume     nv 6 laps 15 feet 5 laps 20 feet yhb   Phan Carries 25 feet x3 5# each side 25 feet x3 5# each side np resume     nv 25 feet x2 5# 25 feet x3 5#   Gait Training             Walking  300 feet no AD or Cam boot length of session 300 feet no AD or Cam boot length of session    300 feet with use of RW at 90% PWB    100 feet SPC at 90% PWB  100 feet no AD CGA  feet no AD length of session 300 feet no AD length of session                Modalities

## 2021-08-09 ENCOUNTER — OFFICE VISIT (OUTPATIENT)
Dept: PHYSICAL THERAPY | Facility: REHABILITATION | Age: 71
End: 2021-08-09
Payer: COMMERCIAL

## 2021-08-09 DIAGNOSIS — S82.851A CLOSED TRIMALLEOLAR FRACTURE OF RIGHT ANKLE, INITIAL ENCOUNTER: Primary | ICD-10-CM

## 2021-08-09 PROCEDURE — 97110 THERAPEUTIC EXERCISES: CPT

## 2021-08-09 PROCEDURE — 97112 NEUROMUSCULAR REEDUCATION: CPT

## 2021-08-09 PROCEDURE — 97140 MANUAL THERAPY 1/> REGIONS: CPT

## 2021-08-09 NOTE — PROGRESS NOTES
Daily Note     Today's date: 2021  Patient name: Kiko Radford  : 1950  MRN: [id number]  Referring provider: Alpesh Britton MD  Dx:   Encounter Diagnosis     ICD-10-CM    1  Closed trimalleolar fracture of right ankle, subsequent encounter  S82 851A        Start Time: 0800  Stop Time: 0845  Total time in clinic (min): 45 minutes    Subjective: Patient reports his foot is feeling normal in the boot at this point  Objective: See treatment diary below      Assessment: Tolerated progressions well  GROC >90%  Patient demonstrating slight weakness with single-leg heel raises requiring BUE support to complete  Patient would benefit from continued PT      Plan: Continue per plan of care        Precautions: Tri-Malleolar fx RLE DOS       Manuals    PROM R ankle CT   JLW PRR    CT np   AP glides of talus Gr 2-3            Distraction CT Gr 2-3  CT Gr 2-3          Subtalar med and lat glides   CT Gr 2-3  CT Gr 2-3                     Neuro Re-Ed             Wobbleboard  -AP  -ML seated 2' each seated 2' each standing 50x med/lat  30x cw/ccw stand  30 ea standing 30x ea    seated 1'   each seated 2' each   Minisquats 2x10 2x15 2x15 30 2x10 5# kb from chair        VG L7 4' L7 4' L7 4' DL  L7 4' L7 5' DL, Sl 2'    L7 2' L7 4'   Step ups onto bosu    fwd/lat   20 ea fwd/lat   20 ea                     Ther Ex             SL Heel Raise     2x10        Seated HR/TR Std 2x10 Std 3x10 Std 3x10  stand   30 ea 30x    30x 30x   Biodex  Lvl 9 x5 lv 8 x5 L6 x5      Lv12 x5 Lv11 x5   Bike 10' lv 3 10' lv3 7' lv1  7' lvl 1 7' lv 2    10' lvl 3 10' lv 3   Ther Activity             Step Ups  8" step 2x10 np resume np resume      6" step 2x10 8" step 2x10   Side Stepping np resume np resume np resume      6 laps 15 feet 5 laps 20 feet yhb   Phan Carries 25 feet x3 5# each side 25 feet x3 5# each side np resume      25 feet x2 5# 25 feet x3 5#   Gait Training             Walking 300 feet no AD or Cam boot length of session 300 feet no AD or Cam boot length of session       300 feet no AD length of session 300 feet no AD length of session                Modalities

## 2021-08-10 ENCOUNTER — APPOINTMENT (OUTPATIENT)
Dept: RADIOLOGY | Facility: AMBULARY SURGERY CENTER | Age: 71
End: 2021-08-10
Attending: ORTHOPAEDIC SURGERY
Payer: COMMERCIAL

## 2021-08-10 ENCOUNTER — OFFICE VISIT (OUTPATIENT)
Dept: OBGYN CLINIC | Facility: CLINIC | Age: 71
End: 2021-08-10

## 2021-08-10 VITALS
BODY MASS INDEX: 37.65 KG/M2 | RESPIRATION RATE: 17 BRPM | SYSTOLIC BLOOD PRESSURE: 127 MMHG | HEIGHT: 65 IN | WEIGHT: 226 LBS | HEART RATE: 73 BPM | DIASTOLIC BLOOD PRESSURE: 71 MMHG

## 2021-08-10 DIAGNOSIS — M25.571 PAIN, JOINT, ANKLE AND FOOT, RIGHT: Primary | ICD-10-CM

## 2021-08-10 DIAGNOSIS — M25.571 PAIN, JOINT, ANKLE AND FOOT, RIGHT: ICD-10-CM

## 2021-08-10 PROCEDURE — 73610 X-RAY EXAM OF ANKLE: CPT

## 2021-08-10 PROCEDURE — 99024 POSTOP FOLLOW-UP VISIT: CPT | Performed by: ORTHOPAEDIC SURGERY

## 2021-08-10 PROCEDURE — 3008F BODY MASS INDEX DOCD: CPT | Performed by: FAMILY MEDICINE

## 2021-08-10 NOTE — PROGRESS NOTES
KATIE Villasenor  Attending, Orthopaedic Surgery  Foot and Ankle  [facility]      ORTHOPAEDIC FOOT AND ANKLE POST-OP VISIT     Procedure:     ORIF right fibula with syndesmosis fixation and deltoid repair       Date of surgery:   5/17/21      PLAN  1  Weightbearing Status- WBAT operative extremity  2  DVT prophylaxis- ASA 325mg BID completed  3  Continue to elevate 23hrs/day getting up 1x per hour to prevent a blood clot  4  Pain control- OTC pain medication  5  RTC as needed      History of Present Illness:   Chief Complaint: Right ankle surgery    Bella Sims  is a 66 y o  male who is being seen for post-operative visit for the above procedure  Pain is well controlled and the patient has successfully transitioned to OTC pain medicines  he has completed his ASA 325mg BID for DVT prophylaxis  Patient has been WBAT in a CAM boot that was transitioned to a supportive shoe  Patient is doing very well overall, he has no pain and is progressing with PT  Review of Systems:  General- denies fever/chills  Respiratory- denies cough or SOB  Cardio- denies chest pain or palpitations  GI- denies abdominal pain  Musculoskeletal- Negative except noted above  Skin- denies rashes or wounds    Physical Exam:   There were no vitals taken for this visit  General/Constitutional: No apparent distress: well-nourished and well developed  Eyes: normal ocular motion  Lymphatic: No appreciable lymphadenopathy  Respiratory: Non-labored breathing  Vascular: No edema, swelling or tenderness, except as noted in detailed exam   Integumentary: No impressive skin lesions present, except as noted in detailed exam   Neuro: No ataxia or tremors noted  Psych: Normal mood and affect, oriented to person, place and time  Appropriate affect  Musculoskeletal: Normal, except as noted in detailed exam and in HPI  Examination    right        Incision Clean, dry, intact  Sutures Previously removed  Ecchymosis none    Swelling Mild    Sensation Intact to light touch throughout sural, saphenous, superficial peroneal, deep peroneal and medial/lateral plantar nerve distributions  Hansen-Cielo 5 07 filament (10g) testing deferred  Cardiovascular Brisk capillary refill < 2 seconds,intact DP and PT pulses    Special Tests None      Imaging Studies:   3 views of the right ankle were taken, reviewed and interpreted independently that demonstrate stable and intact hardware without evidence of displacement or loosening  Reviewed by me personally  Earle Brow Lachman, MD  Foot & Ankle Surgery   Department of [address] [place] [address]      I personally performed the service  Earle Brow Lachman, MD

## 2021-08-11 ENCOUNTER — OFFICE VISIT (OUTPATIENT)
Dept: PHYSICAL THERAPY | Facility: REHABILITATION | Age: 71
End: 2021-08-11
Payer: COMMERCIAL

## 2021-08-11 DIAGNOSIS — S82.851A CLOSED TRIMALLEOLAR FRACTURE OF RIGHT ANKLE, INITIAL ENCOUNTER: Primary | ICD-10-CM

## 2021-08-11 PROCEDURE — 97110 THERAPEUTIC EXERCISES: CPT

## 2021-08-11 PROCEDURE — 97112 NEUROMUSCULAR REEDUCATION: CPT

## 2021-08-11 PROCEDURE — 97140 MANUAL THERAPY 1/> REGIONS: CPT

## 2021-08-11 PROCEDURE — 97530 THERAPEUTIC ACTIVITIES: CPT

## 2021-08-11 NOTE — PROGRESS NOTES
Daily Note     Today's date: 2021  Patient name: Vicente Leyva  : 1950  MRN: [id number]  Referring provider: Jeremy May MD  Dx:   Encounter Diagnosis     ICD-10-CM    1  Closed trimalleolar fracture of right ankle, subsequent encounter  S82 851A        Start Time: 0800  Stop Time: 0845  Total time in clinic (min): 45 minutes    Subjective: Patient reports foot is feeling normal      Objective: See treatment diary below      Assessment: Tolerated progressions w/o any pain or LOBs  Patient able to perform single-leg heel raise without discomfort  Patient would benefit from continued PT      Plan: Continue per plan of care        Precautions: Tri-Malleolar fx RLE DOS       Manuals    PROM R ankle AR   JLW PRR PRR   AR np   AP glides of talus Gr 2-3            Distraction AR Gr 2-3  AR Gr 2-3          Subtalar med and lat glides   AR Gr 2-3  AR Gr 2-3 AR Gr 2-3                    Neuro Re-Ed             Wobbleboard  -AP  -ML seated 2' each seated 2' each standing 50x med/lat  30x cw/ccw stand  30 ea standing 30x ea standing 30x ea   seated 1'   each seated 2' each   Minisquats 2x10 2x15 2x15 30 2x10 5# kb from chair 2x10 5# kb from chair       VG L7 4' L7 4' L7 4' DL  L7 4' L7 5' DL, Sl 2' np resume   L7 2' L7 4'   Step ups onto bosu    fwd/lat   20 ea fwd/lat   20 ea Fwd/lat 30x                    Ther Ex             SL Heel Raise     2x10 2x15       Seated HR/TR Std 2x10 Std 3x10 Std 3x10  stand   30 ea 30x 30x   30x 30x   Biodex  Lvl 9 x5 lv 8 x5 L6 x5      Lv12 x5 Lv11 x5   [employer]      3# Fwd 10x  Lat 5x       Bike 10' lv 3 10' lv3 7' lv1  7' lvl 1 7' lv 2 7' lv 2   10' lvl 3 10' lv 3   Ther Activity             Step Ups  8" step 2x10 np resume np resume      6" step 2x10 8" step 2x10   Side Stepping np resume np resume np resume      6 laps 15 feet 5 laps 20 feet yhb   Phan Carries 25 feet x3 5# each side 25 feet x3 5# each side np resume      25 feet x2 5# 25 feet x3 5#   Gait Training             Walking  300 feet no AD or Cam boot length of session 300 feet no AD or Cam boot length of session       300 feet no AD length of session 300 feet no AD length of session                Modalities

## 2021-08-16 ENCOUNTER — OFFICE VISIT (OUTPATIENT)
Dept: PHYSICAL THERAPY | Facility: REHABILITATION | Age: 71
End: 2021-08-16
Payer: COMMERCIAL

## 2021-08-16 DIAGNOSIS — S82.851A CLOSED TRIMALLEOLAR FRACTURE OF RIGHT ANKLE, INITIAL ENCOUNTER: Primary | ICD-10-CM

## 2021-08-16 PROCEDURE — 97530 THERAPEUTIC ACTIVITIES: CPT

## 2021-08-16 PROCEDURE — 97112 NEUROMUSCULAR REEDUCATION: CPT

## 2021-08-16 PROCEDURE — 97110 THERAPEUTIC EXERCISES: CPT

## 2021-08-16 PROCEDURE — 97140 MANUAL THERAPY 1/> REGIONS: CPT

## 2021-08-16 NOTE — PROGRESS NOTES
Daily Note     Today's date: 2021  Patient name: Nolan Dance  : 1950  MRN: [id number]  Referring provider: Cleophus Galeazzi, MD  Dx:   Encounter Diagnosis     ICD-10-CM    1  Closed trimalleolar fracture of right ankle, subsequent encounter  S82 851A        Start Time: 0800  Stop Time: 0845  Total time in clinic (min): 45 minutes     Subjective: Patient reports ankle is not limiting him at home at this point  Objective: See treatment diary below      Assessment: Patient R ankle PROM still limited in inversion and eversion  R ankle strength normalizing  Patient would benefit from continued PT      Plan: Continue per plan of care        Precautions: Tri-Malleolar fx RLE DOS       Manuals    PROM R ankle OK   JLW PRR PRR PRR  OK np   AP glides of talus Gr 2-3            Distraction OK Gr 2-3  OK Gr 2-3          Subtalar med and lat glides   OK Gr 2-3  OK Gr 2-3 OK Gr 2-3 OK Gr 2-3                   Neuro Re-Ed             Wobbleboard  -AP  -ML seated 2' each seated 2' each standing 50x med/lat  30x cw/ccw stand  30 ea standing 30x ea standing 30x ea standing 50x ea  seated 1'   each seated 2' each   Minisquats 2x10 2x15 2x15 30 2x10 5# kb from chair 2x10 5# kb from chair 2x10 5# kb from chair      VG L7 4' L7 4' L7 4' DL  L7 4' L7 5' DL, Sl 2' np resume L7 3' DL, SL 3'  L7 2' L7 4'   Step ups onto bosu    fwd/lat   20 ea fwd/lat   20 ea Fwd/lat 30x Fwd/lat 30x                   Ther Ex             SL Heel Raise     2x10 2x15 2x15      Seated HR/TR Std 2x10 Std 3x10 Std 3x10  stand   30 ea 30x 30x 30x  30x 30x   Biodex  Lvl 9 x5 lv 8 x5 L6 x5      Lv12 x5 Lv11 x5   [employer]      3# Fwd 10x  Lat 5x 3# back 10x  Lat 5x      Bike 10' lv 3 10' lv3 7' lv1  7' lvl 1 7' lv 2 7' lv 2 7' lv 2  10' lvl 3 10' lv 3   Ther Activity             Step Ups  8" step 2x10 np resume np resume      6" step 2x10 8" step 2x10   Side Stepping np resume np resume np resume      6 laps 15 feet 5 laps 20 feet yhb   Phan Carries 25 feet x3 5# each side 25 feet x3 5# each side np resume      25 feet x2 5# 25 feet x3 5#   Gait Training             Walking  300 feet no AD or Cam boot length of session 300 feet no AD or Cam boot length of session       300 feet no AD length of session 300 feet no AD length of session                Modalities

## 2021-08-18 ENCOUNTER — OFFICE VISIT (OUTPATIENT)
Dept: PHYSICAL THERAPY | Facility: REHABILITATION | Age: 71
End: 2021-08-18
Payer: COMMERCIAL

## 2021-08-18 DIAGNOSIS — S82.851A CLOSED TRIMALLEOLAR FRACTURE OF RIGHT ANKLE, INITIAL ENCOUNTER: Primary | ICD-10-CM

## 2021-08-18 PROCEDURE — 97110 THERAPEUTIC EXERCISES: CPT

## 2021-08-18 PROCEDURE — 97140 MANUAL THERAPY 1/> REGIONS: CPT

## 2021-08-18 PROCEDURE — 97112 NEUROMUSCULAR REEDUCATION: CPT

## 2021-08-18 NOTE — PROGRESS NOTES
Daily Note     Today's date: 2021  Patient name: Valdemar Patten  : 1950  MRN: [id number]  Referring provider: Rohini Zaldivar MD  Dx:   Encounter Diagnosis     ICD-10-CM    1  Closed trimalleolar fracture of right ankle, subsequent encounter  S82 851A        Start Time: 0800  Stop Time: 0845  Total time in clinic (min): 45 minutes    Subjective: Patient had no new c/o pain since last visit  Objective: See treatment diary below      Assessment: Patient balance and single leg strength continue to improve with each visit  Patient would benefit from continued PT      Plan: Continue per plan of care        Precautions: Tri-Malleolar fx RLE DOS       Manuals      PROM R ankle NE   JLW PRR PRR PRR PRR     AP glides of talus Gr 2-3            Distraction NE Gr 2-3  NE Gr 2-3          Subtalar med and lat glides   NE Gr 2-3  NE Gr 2-3 NE Gr 2-3 NE Gr 2-3 PRR Gr2-3                  Neuro Re-Ed             Wobbleboard  -AP  -ML seated 2' each seated 2' each standing 50x med/lat  30x cw/ccw stand  30 ea standing 30x ea standing 30x ea standing 50x ea standing 60x ea     Minisquats 2x10 2x15 2x15 30 2x10 5# kb from chair 2x10 5# kb from chair 2x10 5# kb from chair 2x10 5# kb from chair     VG L7 4' L7 4' L7 4' DL  L7 4' L7 5' DL, Sl 2' np resume L7 3' DL, SL 3' L7 4' DL, SL 3'     Step ups onto bosu    fwd/lat   20 ea fwd/lat   20 ea Fwd/lat 30x Fwd/lat 30x Fwd/lat 40x                  Ther Ex             SL Heel Raise     2x10 2x15 2x15 2x15     Seated HR/TR Std 2x10 Std 3x10 Std 3x10  stand   30 ea 30x 30x 30x 2x20     Biodex  Lvl 9 x5 lv 8 x5 L6 x5          [employer]      3# Fwd 10x  Lat 5x 3# back 10x  Lat 5x np resume     Bike 10' lv 3 10' lv3 7' lv1  7' lvl 1 7' lv 2 7' lv 2 7' lv 2 7' lv2     Ther Activity             Step Ups  8" step 2x10 np resume np resume          Side Stepping np resume np resume np resume          [employer] 25 feet x3 5# each side 25 feet x3 5# each side np resume          Gait Training             Walking  300 feet no AD or Cam boot length of session 300 feet no AD or Cam boot length of session                        Modalities

## 2021-08-23 ENCOUNTER — OFFICE VISIT (OUTPATIENT)
Dept: PHYSICAL THERAPY | Facility: REHABILITATION | Age: 71
End: 2021-08-23
Payer: COMMERCIAL

## 2021-08-23 DIAGNOSIS — S82.851A CLOSED TRIMALLEOLAR FRACTURE OF RIGHT ANKLE, INITIAL ENCOUNTER: Primary | ICD-10-CM

## 2021-08-23 PROCEDURE — 97112 NEUROMUSCULAR REEDUCATION: CPT

## 2021-08-23 PROCEDURE — 97140 MANUAL THERAPY 1/> REGIONS: CPT

## 2021-08-23 PROCEDURE — 97110 THERAPEUTIC EXERCISES: CPT

## 2021-08-23 NOTE — PROGRESS NOTES
Daily Note     Today's date: 2021  Patient name: Margy Diego  : 1950  MRN: [id number]  Referring provider: Sandi Quiroz MD  Dx:   Encounter Diagnosis     ICD-10-CM    1  Closed trimalleolar fracture of right ankle, subsequent encounter  S82 851A        Start Time: 0800  Stop Time: 0845  Total time in clinic (min): 45 minutes    Subjective: Patient had no new c/o pain since last visit  Objective: See treatment diary below      Assessment: Patient ankle inversion and eversion PROM still limited but improving slowly  Patient demonstrating good strength with single-leg heel raises  Patient would benefit from continued PT      Plan: Continue per plan of care        Precautions: Tri-Malleolar fx RLE DOS       Manuals     PROM R ankle RI   JLW PRR PRR PRR PRR PRR    AP glides of talus Gr 2-3            Distraction RI Gr 2-3  RI Gr 2-3          Subtalar med and lat glides   RI Gr 2-3  RI Gr 2-3 RI Gr 2-3 RI Gr 2-3 PRR Gr2-3 PRR Gr2-3                 Neuro Re-Ed             Wobbleboard  -AP  -ML seated 2' each seated 2' each standing 50x med/lat  30x cw/ccw stand  30 ea standing 30x ea standing 30x ea standing 50x ea standing 60x ea standing 60x ea    Minisquats 2x10 2x15 2x15 30 2x10 5# kb from chair 2x10 5# kb from chair 2x10 5# kb from chair 2x10 5# kb from chair 2x10 7# kb from chair    VG L7 4' L7 4' L7 4' DL  L7 4' L7 5' DL, Sl 2' np resume L7 3' DL, SL 3' L7 4' DL, SL 3' L7 4' DL, SL 3'    Step ups onto bosu    fwd/lat   20 ea fwd/lat   20 ea Fwd/lat 30x Fwd/lat 30x Fwd/lat 40x Fwd/lat 40x                 Ther Ex             SL Heel Raise     2x10 2x15 2x15 2x15 2x15    Seated HR/TR Std 2x10 Std 3x10 Std 3x10  stand   30 ea 30x 30x 30x 2x20 2x20    Biodex  Lvl 9 x5 lv 8 x5 L6 x5          [employer]      3# Fwd 10x  Lat 5x 3# back 10x  Lat 5x np resume     Bike 10' lv 3 10' lv3 7' lv1  7' lvl 1 7' lv 2 7' lv 2 7' lv 2 7' lv2 7' lv2 Ther Activity             Step Ups  8" step 2x10 np resume np resume          Side Stepping np resume np resume np resume          [employer] 25 feet x3 5# each side 25 feet x3 5# each side np resume          Gait Training             Walking  300 feet no AD or Cam boot length of session 300 feet no AD or Cam boot length of session                        Modalities

## 2021-08-25 ENCOUNTER — OFFICE VISIT (OUTPATIENT)
Dept: PHYSICAL THERAPY | Facility: REHABILITATION | Age: 71
End: 2021-08-25
Payer: COMMERCIAL

## 2021-08-25 DIAGNOSIS — S82.851A CLOSED TRIMALLEOLAR FRACTURE OF RIGHT ANKLE, INITIAL ENCOUNTER: Primary | ICD-10-CM

## 2021-08-25 PROCEDURE — 97140 MANUAL THERAPY 1/> REGIONS: CPT

## 2021-08-25 PROCEDURE — 97112 NEUROMUSCULAR REEDUCATION: CPT

## 2021-08-25 PROCEDURE — 97110 THERAPEUTIC EXERCISES: CPT

## 2021-08-25 NOTE — PROGRESS NOTES
Daily Note     Today's date: 2021  Patient name: Elissa Barroso  : 1950  MRN: [id number]  Referring provider: Alex Aranda MD  Dx:   Encounter Diagnosis     ICD-10-CM    1  Closed trimalleolar fracture of right ankle, subsequent encounter  S82 851A        Start Time: 0800  Stop Time: 0845  Total time in clinic (min): 45 minutes    Subjective: Patient reports foot is feeling good  Objective: See treatment diary below      Assessment: Patient still demonstrating some slight limitations in R ankle inversion and eversion PROM, however L ankle also demonstrates some PROM limitations as well  Patient would benefit from continued PT      Plan: Continue per plan of care        Precautions: Tri-Malleolar fx RLE DOS       Manuals    PROM R ankle NE   JLW PRR PRR PRR PRR PRR PRR   AP glides of talus Gr 2-3            Distraction NE Gr 2-3  NE Gr 2-3          Subtalar med and lat glides   NE Gr 2-3  NE Gr 2-3 NE Gr 2-3 NE Gr 2-3 PRR Gr2-3 PRR Gr2-3 PRR Gr2-3                Neuro Re-Ed             Wobbleboard  -AP  -ML seated 2' each seated 2' each standing 50x med/lat  30x cw/ccw stand  30 ea standing 30x ea standing 30x ea standing 50x ea standing 60x ea standing 60x ea standing 60x ea   Minisquats 2x10 2x15 2x15 30 2x10 5# kb from chair 2x10 5# kb from chair 2x10 5# kb from chair 2x10 5# kb from chair 2x10 7# kb from chair 3x8 7# kb from chair   VG L7 4' L7 4' L7 4' DL  L7 4' L7 5' DL, Sl 2' np resume L7 3' DL, SL 3' L7 4' DL, SL 3' L7 4' DL, SL 3' L7 4' DL, SL 3'   Step ups onto bosu    fwd/lat   20 ea fwd/lat   20 ea Fwd/lat 30x Fwd/lat 30x Fwd/lat 40x Fwd/lat 40x Fwd/lat 40x                Ther Ex             SL Heel Raise     2x10 2x15 2x15 2x15 2x15 2x15   Seated HR/TR Std 2x10 Std 3x10 Std 3x10  stand   30 ea 30x 30x 30x 2x20 2x20 2x20   Biodex  Lvl 9 x5 lv 8 x5 L6 x5          [place] [employer]      3# Fwd 10x  Lat 5x 3# back 10x  Lat 5x np resume     Bike 10' lv 3 10' lv3 7' lv1  7' lvl 1 7' lv 2 7' lv 2 7' lv 2 7' lv2 7' lv2 7' lv2   Ther Activity             Step Ups  8" step 2x10 np resume np resume          Side Stepping np resume np resume np resume          [employer] 25 feet x3 5# each side 25 feet x3 5# each side np resume          Gait Training             Walking  300 feet no AD or Cam boot length of session 300 feet no AD or Cam boot length of session                        Modalities

## 2021-08-30 ENCOUNTER — OFFICE VISIT (OUTPATIENT)
Dept: PHYSICAL THERAPY | Facility: REHABILITATION | Age: 71
End: 2021-08-30
Payer: COMMERCIAL

## 2021-08-30 DIAGNOSIS — S82.851A CLOSED TRIMALLEOLAR FRACTURE OF RIGHT ANKLE, INITIAL ENCOUNTER: Primary | ICD-10-CM

## 2021-08-30 PROCEDURE — 97140 MANUAL THERAPY 1/> REGIONS: CPT

## 2021-08-30 PROCEDURE — 97112 NEUROMUSCULAR REEDUCATION: CPT

## 2021-08-30 PROCEDURE — 97110 THERAPEUTIC EXERCISES: CPT

## 2021-08-30 NOTE — PROGRESS NOTES
Daily Note     Today's date: 2021  Patient name: Della Sheikh  : 1950  MRN: [id number]  Referring provider: Johana Arce MD  Dx:   Encounter Diagnosis     ICD-10-CM    1  Closed trimalleolar fracture of right ankle, subsequent encounter  S82 851A        Start Time: 0800  Stop Time: 0845  Total time in clinic (min): 45 minutes    Subjective: Patient had no new c/o pain since last visit  Objective: See treatment diary below      Assessment: Patient continues to progress with each visit  Patient continues to be pain free with all activities  Demonstrating ability to perform x30 single-leg heel raises with BUE support with issues  Plan to d/c patient next visit  Patient would benefit from continued PT      Plan: Potential discharge next visit       Precautions: Tri-Malleolar fx RLE DOS       Manuals    PROM R ankle PRR   JLW PRR PRR PRR PRR PRR PRR   AP glides of talus             Distraction   OK Gr 2-3          Subtalar med and lat glides PRR Gr 3-4  OK Gr 2-3  OK Gr 2-3 OK Gr 2-3 OK Gr 2-3 PRR Gr2-3 PRR Gr2-3 PRR Gr2-3                Neuro Re-Ed             Wobbleboard  -AP  -ML standing 60x ea seated 2' each standing 50x med/lat  30x cw/ccw stand  30 ea standing 30x ea standing 30x ea standing 50x ea standing 60x ea standing 60x ea standing 60x ea   Minisquats 3x8 7# kb from chair 2x15 2x15 30 2x10 5# kb from chair 2x10 5# kb from chair 2x10 5# kb from chair 2x10 5# kb from chair 2x10 7# kb from chair 3x8 7# kb from chair   VG L7 4' DL, SL 3' L7 4' L7 4' DL  L7 4' L7 5' DL, Sl 2' np resume L7 3' DL, SL 3' L7 4' DL, SL 3' L7 4' DL, SL 3' L7 4' DL, SL 3'   Step ups onto bosu Fwd/lat 40x   fwd/lat   20 ea fwd/lat   20 ea Fwd/lat 30x Fwd/lat 30x Fwd/lat 40x Fwd/lat 40x Fwd/lat 40x                Ther Ex             SL Heel Raise 2x20    2x10 2x15 2x15 2x15 2x15 2x15   Seated HR/TR 2x20 Std 3x10 Std 3x10  stand   30 ea 30x 30x 30x 2x20 2x20 2x20   Biodex   lv 8 x5 L6 x5          [employer]      3# Fwd 10x  Lat 5x 3# back 10x  Lat 5x np resume     Bike 7' lv 2 10' lv3 7' lv1  7' lvl 1 7' lv 2 7' lv 2 7' lv 2 7' lv2 7' lv2 7' lv2   Ther Activity             Step Ups   np resume np resume          Side Stepping  np resume np resume          20x200  25 feet x3 5# each side np resume          Gait Training             Walking   300 feet no AD or Cam boot length of session                        Modalities

## 2021-09-01 ENCOUNTER — OFFICE VISIT (OUTPATIENT)
Dept: PHYSICAL THERAPY | Facility: REHABILITATION | Age: 71
End: 2021-09-01
Payer: COMMERCIAL

## 2021-09-01 DIAGNOSIS — S82.851A CLOSED TRIMALLEOLAR FRACTURE OF RIGHT ANKLE, INITIAL ENCOUNTER: Primary | ICD-10-CM

## 2021-09-01 PROCEDURE — 97140 MANUAL THERAPY 1/> REGIONS: CPT

## 2021-09-01 PROCEDURE — 97110 THERAPEUTIC EXERCISES: CPT

## 2021-09-01 PROCEDURE — 97112 NEUROMUSCULAR REEDUCATION: CPT

## 2021-09-01 NOTE — PROGRESS NOTES
PT Discharge     Today's date: 2021  Patient name: Uriah Frye  : 1950  MRN: [id number]  Referring provider: Ella Bob MD  Dx:   Encounter Diagnosis     ICD-10-CM    1  Closed trimalleolar fracture of right ankle, subsequent encounter  [id number]        Start Time: 0800  Stop Time: 0845  Total time in clinic (min): 45 minutes        Assessment:   Patient has been able to return to PLOF without any any pain  Patient demonstrating ability to perform 25 singe leg heel raises with use of BUE for support  Patient demonstrates normalized gait at this time  Patient has met all of his goals in PT at this time  Patient will be discharged to [facility]  Pain Levels: 0/10    Short Term Goals (Week 4): met all  1  Decreased pain by 50%  2  Improve ROM by 10 degrees in all directions  3  Improve strength by 1/2 measure in all directions      Long Term Goals (8 weeks): met all  1  <2/10 pain when ambulating short community distances  2  FOTO score >50  3  Fully independent with HEP by discharge      Plan: Discharge patient at this time       Precautions: Tri-Malleolar fx RLE DOS /      Manuals    PROM R ankle PRR PRR  JLW PRR PRR PRR PRR PRR PRR   AP glides of talus             Distraction   UT Gr 2-3          Subtalar med and lat glides PRR Gr 3-4  UT Gr 2-3  UT Gr 2-3 UT Gr 2-3 UT Gr 2-3 PRR Gr2-3 PRR Gr2-3 PRR Gr2-3                Neuro Re-Ed             Wobbleboard  -AP  -ML standing 60x ea  standing 50x med/lat  30x cw/ccw stand  30 ea standing 30x ea standing 30x ea standing 50x ea standing 60x ea standing 60x ea standing 60x ea   Minisquats 3x8 7# kb from chair  2x15 30 2x10 5# kb from chair 2x10 5# kb from chair 2x10 5# kb from chair 2x10 5# kb from chair 2x10 7# kb from chair 3x8 7# kb from chair   VG L7 4' DL, SL 3'  L7 4' DL  L7 4' L7 5' DL, Sl 2' np resume L7 3' DL, SL 3' L7 4' DL, SL 3' L7 4' DL, SL 3' L7 4' DL, SL 3'   Step ups onto bosu Fwd/lat 40x   fwd/lat   20 ea fwd/lat   20 ea Fwd/lat 30x Fwd/lat 30x Fwd/lat 40x Fwd/lat 40x Fwd/lat 40x   Assessment  37'           Ther Ex             SL Heel Raise 2x20    2x10 2x15 2x15 2x15 2x15 2x15   Seated HR/TR 2x20  Std 3x10  stand   30 ea 30x 30x 30x 2x20 2x20 2x20   Biodex    L6 x5          [place] Step Outs      3# Fwd 10x  Lat 5x 3# back 10x  Lat 5x np resume     Bike 7' lv 2 8' Lv 2 7' lv1  7' lvl 1 7' lv 2 7' lv 2 7' lv 2 7' lv2 7' lv2 7' lv2   Ther Activity             Step Ups    np resume          Side Stepping   np resume          1EQ   np resume          Gait Training             Walking                           Modalities

## 2021-09-08 ENCOUNTER — VBI (OUTPATIENT)
Dept: ADMINISTRATIVE | Facility: OTHER | Age: 71
End: 2021-09-08

## 2021-09-08 NOTE — TELEPHONE ENCOUNTER
09/08/21 10:09 AM     See documentation in the [facility]       Bernardine Earnest

## 2021-09-23 ENCOUNTER — RA CDI HCC (OUTPATIENT)
Dept: OTHER | Facility: HOSPITAL | Age: 71
End: 2021-09-23

## 2021-09-23 NOTE — PROGRESS NOTES
Janny [facility]  coding opportunities       Chart reviewed, no opportunity found: CHART REVIEWED, NO OPPORTUNITY FOUND                        Patients insurance company: [facility] [address]  (Medicare Advantage and Commercial)

## 2021-09-29 ENCOUNTER — OFFICE VISIT (OUTPATIENT)
Dept: FAMILY MEDICINE CLINIC | Facility: CLINIC | Age: 71
End: 2021-09-29
Payer: COMMERCIAL

## 2021-09-29 VITALS
OXYGEN SATURATION: 97 % | HEIGHT: 65 IN | TEMPERATURE: 96.5 F | WEIGHT: 225 LBS | DIASTOLIC BLOOD PRESSURE: 78 MMHG | SYSTOLIC BLOOD PRESSURE: 120 MMHG | BODY MASS INDEX: 37.49 KG/M2 | HEART RATE: 69 BPM

## 2021-09-29 DIAGNOSIS — F41.9 ANXIETY: ICD-10-CM

## 2021-09-29 DIAGNOSIS — I10 ESSENTIAL HYPERTENSION: Primary | ICD-10-CM

## 2021-09-29 DIAGNOSIS — E66.01 OBESITY, MORBID (HCC): ICD-10-CM

## 2021-09-29 DIAGNOSIS — F32.0 CURRENT MILD EPISODE OF MAJOR DEPRESSIVE DISORDER WITHOUT PRIOR EPISODE (HCC): ICD-10-CM

## 2021-09-29 PROCEDURE — 99214 OFFICE O/P EST MOD 30 MIN: CPT | Performed by: FAMILY MEDICINE

## 2021-09-29 RX ORDER — ESCITALOPRAM OXALATE 10 MG/1
10 TABLET ORAL DAILY
Qty: 90 TABLET | Refills: 3 | Status: SHIPPED | OUTPATIENT
Start: 2021-09-29 | End: 2022-04-20 | Stop reason: SDUPTHER

## 2021-10-31 LAB
BASOPHILS # BLD AUTO: 31 CELLS/UL (ref 0–200)
BASOPHILS NFR BLD AUTO: 0.5 %
EOSINOPHIL # BLD AUTO: 159 CELLS/UL (ref 15–500)
EOSINOPHIL NFR BLD AUTO: 2.6 %
ERYTHROCYTE [DISTWIDTH] IN BLOOD BY AUTOMATED COUNT: 13 % (ref 11–15)
HCT VFR BLD AUTO: 49.4 % (ref 38.5–50)
HGB BLD-MCNC: 16.8 G/DL (ref 13.2–17.1)
LYMPHOCYTES # BLD AUTO: 1348 CELLS/UL (ref 850–3900)
LYMPHOCYTES NFR BLD AUTO: 22.1 %
MCH RBC QN AUTO: 32.6 PG (ref 27–33)
MCHC RBC AUTO-ENTMCNC: 34 G/DL (ref 32–36)
MCV RBC AUTO: 95.9 FL (ref 80–100)
MONOCYTES # BLD AUTO: 695 CELLS/UL (ref 200–950)
MONOCYTES NFR BLD AUTO: 11.4 %
NEUTROPHILS # BLD AUTO: 3867 CELLS/UL (ref 1500–7800)
NEUTROPHILS NFR BLD AUTO: 63.4 %
PLATELET # BLD AUTO: 90 THOUSAND/UL (ref 140–400)
PMV BLD REES-ECKER: 11.5 FL (ref 7.5–12.5)
RBC # BLD AUTO: 5.15 MILLION/UL (ref 4.2–5.8)
SERVICE CMNT-IMP: ABNORMAL
WBC # BLD AUTO: 6.1 THOUSAND/UL (ref 3.8–10.8)

## 2021-11-12 ENCOUNTER — OFFICE VISIT (OUTPATIENT)
Dept: HEMATOLOGY ONCOLOGY | Facility: CLINIC | Age: 71
End: 2021-11-12
Payer: COMMERCIAL

## 2021-11-12 VITALS
RESPIRATION RATE: 18 BRPM | SYSTOLIC BLOOD PRESSURE: 128 MMHG | WEIGHT: 226 LBS | DIASTOLIC BLOOD PRESSURE: 80 MMHG | HEART RATE: 75 BPM | OXYGEN SATURATION: 98 % | BODY MASS INDEX: 37.65 KG/M2 | HEIGHT: 65 IN | TEMPERATURE: 98.6 F

## 2021-11-12 DIAGNOSIS — D69.6 THROMBOCYTOPENIA (HCC): Primary | ICD-10-CM

## 2021-11-12 PROCEDURE — 99213 OFFICE O/P EST LOW 20 MIN: CPT | Performed by: PHYSICIAN ASSISTANT

## 2021-11-12 PROCEDURE — 1036F TOBACCO NON-USER: CPT | Performed by: PHYSICIAN ASSISTANT

## 2021-11-12 PROCEDURE — 3079F DIAST BP 80-89 MM HG: CPT | Performed by: PHYSICIAN ASSISTANT

## 2021-11-12 PROCEDURE — 3008F BODY MASS INDEX DOCD: CPT | Performed by: PHYSICIAN ASSISTANT

## 2021-11-12 PROCEDURE — 3074F SYST BP LT 130 MM HG: CPT | Performed by: PHYSICIAN ASSISTANT

## 2021-11-27 ENCOUNTER — IMMUNIZATIONS (OUTPATIENT)
Dept: FAMILY MEDICINE CLINIC | Facility: HOSPITAL | Age: 71
End: 2021-11-27

## 2021-11-27 DIAGNOSIS — Z23 ENCOUNTER FOR IMMUNIZATION: Primary | ICD-10-CM

## 2021-11-27 PROCEDURE — 0001A COVID-19 PFIZER VACC 0.3 ML: CPT

## 2021-11-27 PROCEDURE — 91300 COVID-19 PFIZER VACC 0.3 ML: CPT

## 2021-12-02 ENCOUNTER — VBI (OUTPATIENT)
Dept: ADMINISTRATIVE | Facility: OTHER | Age: 71
End: 2021-12-02

## 2021-12-15 ENCOUNTER — OFFICE VISIT (OUTPATIENT)
Dept: FAMILY MEDICINE CLINIC | Facility: CLINIC | Age: 71
End: 2021-12-15
Payer: COMMERCIAL

## 2021-12-15 VITALS
OXYGEN SATURATION: 95 % | SYSTOLIC BLOOD PRESSURE: 130 MMHG | TEMPERATURE: 95 F | HEIGHT: 65 IN | WEIGHT: 227 LBS | BODY MASS INDEX: 37.82 KG/M2 | DIASTOLIC BLOOD PRESSURE: 76 MMHG | HEART RATE: 84 BPM

## 2021-12-15 DIAGNOSIS — R12 HEART BURN: ICD-10-CM

## 2021-12-15 DIAGNOSIS — F41.9 ANXIETY: ICD-10-CM

## 2021-12-15 DIAGNOSIS — E55.9 VITAMIN D DEFICIENCY: ICD-10-CM

## 2021-12-15 DIAGNOSIS — I10 ESSENTIAL HYPERTENSION: ICD-10-CM

## 2021-12-15 DIAGNOSIS — Z12.5 PROSTATE CANCER SCREENING: Primary | ICD-10-CM

## 2021-12-15 PROCEDURE — 1160F RVW MEDS BY RX/DR IN RCRD: CPT | Performed by: FAMILY MEDICINE

## 2021-12-15 PROCEDURE — 3008F BODY MASS INDEX DOCD: CPT | Performed by: FAMILY MEDICINE

## 2021-12-15 PROCEDURE — 3075F SYST BP GE 130 - 139MM HG: CPT | Performed by: FAMILY MEDICINE

## 2021-12-15 PROCEDURE — 1036F TOBACCO NON-USER: CPT | Performed by: FAMILY MEDICINE

## 2021-12-15 PROCEDURE — 3725F SCREEN DEPRESSION PERFORMED: CPT | Performed by: FAMILY MEDICINE

## 2021-12-15 PROCEDURE — 99214 OFFICE O/P EST MOD 30 MIN: CPT | Performed by: FAMILY MEDICINE

## 2021-12-15 PROCEDURE — 3078F DIAST BP <80 MM HG: CPT | Performed by: FAMILY MEDICINE

## 2021-12-15 RX ORDER — LOSARTAN POTASSIUM 100 MG/1
100 TABLET ORAL DAILY
Qty: 90 TABLET | Refills: 1 | Status: SHIPPED | OUTPATIENT
Start: 2021-12-15 | End: 2022-04-20 | Stop reason: SDUPTHER

## 2021-12-15 RX ORDER — FAMOTIDINE 40 MG/1
40 TABLET, FILM COATED ORAL DAILY
Qty: 90 TABLET | Refills: 1 | Status: SHIPPED | OUTPATIENT
Start: 2021-12-15 | End: 2022-04-20 | Stop reason: SDUPTHER

## 2021-12-15 RX ORDER — ALPRAZOLAM 0.25 MG/1
0.25 TABLET ORAL 4 TIMES DAILY
Qty: 120 TABLET | Refills: 5 | Status: SHIPPED | OUTPATIENT
Start: 2021-12-15 | End: 2022-04-20 | Stop reason: SDUPTHER

## 2021-12-15 RX ORDER — HYDROCHLOROTHIAZIDE 25 MG/1
25 TABLET ORAL DAILY
Qty: 90 TABLET | Refills: 1 | Status: SHIPPED | OUTPATIENT
Start: 2021-12-15 | End: 2022-04-20 | Stop reason: SDUPTHER

## 2021-12-15 RX ORDER — LORAZEPAM 1 MG/1
1 TABLET ORAL 3 TIMES DAILY
Qty: 90 TABLET | Refills: 5 | Status: SHIPPED | OUTPATIENT
Start: 2021-12-15 | End: 2022-04-20 | Stop reason: SDUPTHER

## 2021-12-27 ENCOUNTER — VBI (OUTPATIENT)
Dept: ADMINISTRATIVE | Facility: OTHER | Age: 71
End: 2021-12-27

## 2022-04-13 ENCOUNTER — RA CDI HCC (OUTPATIENT)
Dept: OTHER | Facility: HOSPITAL | Age: 72
End: 2022-04-13

## 2022-04-13 NOTE — PROGRESS NOTES
Janny [facility]  coding opportunities       Chart reviewed, no opportunity found:   [address]        Patients Insurance     Medicare Insurance: [employer] Advantage

## 2022-04-16 LAB
BASOPHILS # BLD AUTO: 0 CELLS/UL (ref 0–200)
BASOPHILS NFR BLD AUTO: 0 %
EOSINOPHIL # BLD AUTO: 118 CELLS/UL (ref 15–500)
EOSINOPHIL NFR BLD AUTO: 2 %
ERYTHROCYTE [DISTWIDTH] IN BLOOD BY AUTOMATED COUNT: 12.6 % (ref 11–15)
HCT VFR BLD AUTO: 49.9 % (ref 38.5–50)
HGB BLD-MCNC: 17.9 G/DL (ref 13.2–17.1)
LYMPHOCYTES # BLD MANUAL: 1192 CELLS/UL (ref 850–3900)
LYMPHOCYTES NFR BLD AUTO: 20.2 %
MCH RBC QN AUTO: 33.4 PG (ref 27–33)
MCHC RBC AUTO-ENTMCNC: 35.9 G/DL (ref 32–36)
MCV RBC AUTO: 93.1 FL (ref 80–100)
MONOCYTES # BLD AUTO: 301 CELLS/UL (ref 200–950)
MONOCYTES NFR BLD AUTO: 5.1 %
NEUTROPHILS # BLD AUTO: 4289 CELLS/UL (ref 1500–7800)
NEUTROPHILS NFR BLD AUTO: 72.7 %
PLATELET # BLD AUTO: 97 THOUSAND/UL (ref 140–400)
PMV BLD REES-ECKER: 11.7 FL (ref 7.5–12.5)
RBC # BLD AUTO: 5.36 MILLION/UL (ref 4.2–5.8)
SERVICE CMNT-IMP: ABNORMAL
SL AMB PLATELET ESTIMATION: ABNORMAL
WBC # BLD AUTO: 5.9 THOUSAND/UL (ref 3.8–10.8)

## 2022-04-20 ENCOUNTER — OFFICE VISIT (OUTPATIENT)
Dept: FAMILY MEDICINE CLINIC | Facility: CLINIC | Age: 72
End: 2022-04-20
Payer: COMMERCIAL

## 2022-04-20 VITALS
SYSTOLIC BLOOD PRESSURE: 126 MMHG | OXYGEN SATURATION: 97 % | WEIGHT: 226 LBS | HEART RATE: 81 BPM | DIASTOLIC BLOOD PRESSURE: 78 MMHG | TEMPERATURE: 97.5 F | BODY MASS INDEX: 37.65 KG/M2 | HEIGHT: 65 IN

## 2022-04-20 DIAGNOSIS — E83.52 HYPERCALCEMIA: ICD-10-CM

## 2022-04-20 DIAGNOSIS — F32.0 CURRENT MILD EPISODE OF MAJOR DEPRESSIVE DISORDER WITHOUT PRIOR EPISODE (HCC): ICD-10-CM

## 2022-04-20 DIAGNOSIS — F41.9 ANXIETY: ICD-10-CM

## 2022-04-20 DIAGNOSIS — I10 ESSENTIAL HYPERTENSION: ICD-10-CM

## 2022-04-20 DIAGNOSIS — Z00.00 MEDICARE ANNUAL WELLNESS VISIT, SUBSEQUENT: Primary | ICD-10-CM

## 2022-04-20 DIAGNOSIS — D69.6 THROMBOCYTOPENIA (HCC): ICD-10-CM

## 2022-04-20 DIAGNOSIS — E21.3 HYPERPARATHYROIDISM (HCC): ICD-10-CM

## 2022-04-20 DIAGNOSIS — E66.01 OBESITY, MORBID (HCC): ICD-10-CM

## 2022-04-20 DIAGNOSIS — R12 HEART BURN: ICD-10-CM

## 2022-04-20 PROCEDURE — 1036F TOBACCO NON-USER: CPT | Performed by: FAMILY MEDICINE

## 2022-04-20 PROCEDURE — 3074F SYST BP LT 130 MM HG: CPT | Performed by: FAMILY MEDICINE

## 2022-04-20 PROCEDURE — G0439 PPPS, SUBSEQ VISIT: HCPCS | Performed by: FAMILY MEDICINE

## 2022-04-20 PROCEDURE — 1125F AMNT PAIN NOTED PAIN PRSNT: CPT | Performed by: FAMILY MEDICINE

## 2022-04-20 PROCEDURE — 1170F FXNL STATUS ASSESSED: CPT | Performed by: FAMILY MEDICINE

## 2022-04-20 PROCEDURE — 3288F FALL RISK ASSESSMENT DOCD: CPT | Performed by: FAMILY MEDICINE

## 2022-04-20 PROCEDURE — 3078F DIAST BP <80 MM HG: CPT | Performed by: FAMILY MEDICINE

## 2022-04-20 PROCEDURE — 1160F RVW MEDS BY RX/DR IN RCRD: CPT | Performed by: FAMILY MEDICINE

## 2022-04-20 PROCEDURE — 1003F LEVEL OF ACTIVITY ASSESS: CPT | Performed by: FAMILY MEDICINE

## 2022-04-20 PROCEDURE — 99215 OFFICE O/P EST HI 40 MIN: CPT | Performed by: FAMILY MEDICINE

## 2022-04-20 PROCEDURE — 3008F BODY MASS INDEX DOCD: CPT | Performed by: FAMILY MEDICINE

## 2022-04-20 PROCEDURE — 1100F PTFALLS ASSESS-DOCD GE2>/YR: CPT | Performed by: FAMILY MEDICINE

## 2022-04-20 PROCEDURE — 3725F SCREEN DEPRESSION PERFORMED: CPT | Performed by: FAMILY MEDICINE

## 2022-04-20 RX ORDER — ATENOLOL 100 MG/1
100 TABLET ORAL 2 TIMES DAILY
Qty: 180 TABLET | Refills: 3 | Status: SHIPPED | OUTPATIENT
Start: 2022-04-20

## 2022-04-20 RX ORDER — POTASSIUM CHLORIDE 750 MG/1
10 TABLET, FILM COATED, EXTENDED RELEASE ORAL DAILY
Qty: 90 TABLET | Refills: 3 | Status: SHIPPED | OUTPATIENT
Start: 2022-04-20 | End: 2022-10-17

## 2022-04-20 RX ORDER — LORAZEPAM 1 MG/1
1 TABLET ORAL 3 TIMES DAILY
Qty: 90 TABLET | Refills: 5 | Status: SHIPPED | OUTPATIENT
Start: 2022-04-20 | End: 2022-10-17

## 2022-04-20 RX ORDER — FAMOTIDINE 40 MG/1
40 TABLET, FILM COATED ORAL DAILY
Qty: 90 TABLET | Refills: 1 | Status: SHIPPED | OUTPATIENT
Start: 2022-04-20

## 2022-04-20 RX ORDER — ALPRAZOLAM 0.25 MG/1
0.25 TABLET ORAL 4 TIMES DAILY
Qty: 120 TABLET | Refills: 5 | Status: SHIPPED | OUTPATIENT
Start: 2022-04-20 | End: 2022-10-17

## 2022-04-20 RX ORDER — HYDROCHLOROTHIAZIDE 25 MG/1
25 TABLET ORAL DAILY
Qty: 90 TABLET | Refills: 1 | Status: SHIPPED | OUTPATIENT
Start: 2022-04-20

## 2022-04-20 RX ORDER — LOSARTAN POTASSIUM 100 MG/1
100 TABLET ORAL DAILY
Qty: 90 TABLET | Refills: 1 | Status: SHIPPED | OUTPATIENT
Start: 2022-04-20

## 2022-04-20 RX ORDER — ESCITALOPRAM OXALATE 10 MG/1
10 TABLET ORAL DAILY
Qty: 90 TABLET | Refills: 3 | Status: SHIPPED | OUTPATIENT
Start: 2022-04-20 | End: 2022-10-17

## 2022-04-20 NOTE — PROGRESS NOTES
Chief Complaint   Patient presents with   [facility] Wellness Visit     Assessment/Plan:  Will call with the labs results  Discussed needed physical activity  Daily walksing  BMI Counseling: Body mass index is 37 61 kg/m²  The BMI is above normal  Nutrition recommendations include reducing portion sizes, consuming healthier snacks, moderation in carbohydrate intake and increasing intake of lean protein  PHQ-2/9 Depression Screening    Little interest or pleasure in doing things: 0 - not at all  Feeling down, depressed, or hopeless: 0 - not at all  Trouble falling or staying asleep, or sleeping too much: 0 - not at all  Feeling tired or having little energy: 2 - more than half the days  Poor appetite or overeatin - not at all  Feeling bad about yourself - or that you are a failure or have let yourself or your family down: 0 - not at all  Trouble concentrating on things, such as reading the newspaper or watching television: 0 - not at all  Moving or speaking so slowly that other people could have noticed  Or the opposite - being so fidgety or restless that you have been moving around a lot more than usual: 0 - not at all  Thoughts that you would be better off dead, or of hurting yourself in some way: 0 - not at all  PHQ-9 Score: 2   PHQ-9 Interpretation: No or Minimal depression           Diagnoses and all orders for this visit:    Medicare annual wellness visit, subsequent    Anxiety  -     ALPRAZolam (XANAX) 0 25 mg tablet; Take 1 tablet (0 25 mg total) by mouth 4 (four) times a day  -     LORazepam (ATIVAN) 1 mg tablet; Take 1 tablet (1 mg total) by mouth 3 (three) times a day    Essential hypertension  -     atenolol (TENORMIN) 100 mg tablet; Take 1 tablet (100 mg total) by mouth 2 (two) times a day  -     hydrochlorothiazide (HYDRODIURIL) 25 mg tablet; Take 1 tablet (25 mg total) by mouth daily  -     losartan (COZAAR) 100 MG tablet;  Take 1 tablet (100 mg total) by mouth daily    Current mild episode of major depressive disorder without prior episode (HCC)  -     escitalopram (LEXAPRO) 10 mg tablet; Take 1 tablet (10 mg total) by mouth daily    Heart burn  -     famotidine (PEPCID) 40 MG tablet; Take 1 tablet (40 mg total) by mouth daily    Hypercalcemia  -     potassium chloride (Klor-Con 10) 10 mEq tablet; Take 1 tablet (10 mEq total) by mouth daily    Hyperparathyroidism (HCC)    Obesity, morbid (HCC)    Thrombocytopenia (HCC)          Subjective:      Patient ID: Mayra Loco  is a 66 y o  male  Labs and refill  Got labs this am at [facility]   S/P ankle fracture - slipped on a wet rock  The following portions of the patient's history were reviewed and updated as appropriate: allergies, current medications, past medical history, past social history, past surgical history and problem list   I have spent 40 minutes with Patient  today in which greater than 50% of this time was spent in counseling/coordination of care regarding Diagnostic results, Risks and benefits of tx options, Intructions for management, Patient and family education, Importance of tx compliance, Risk factor reductions and Impressions  Review of Systems   Constitutional: Negative  HENT: Negative  Eyes: Negative  Respiratory: Negative  Cardiovascular: Negative  Gastrointestinal: Negative  Genitourinary: Negative  Musculoskeletal: Negative  Skin: Negative  Neurological: Negative  Psychiatric/Behavioral: Negative            Objective:      /78 (BP Location: Left arm, Patient Position: Sitting, Cuff Size: Large)   Pulse 81   Temp 97 5 °F (36 4 °C) (Temporal)   Ht 5' 5" (1 651 m)   Wt 103 kg (226 lb)   SpO2 97%   BMI 37 61 kg/m²     Current Outpatient Medications:     ALPRAZolam (XANAX) 0 25 mg tablet, Take 1 tablet (0 25 mg total) by mouth 4 (four) times a day, Disp: 120 tablet, Rfl: 5    Ascorbic Acid (VITAMIN C) 100 MG CHEW, Chew 1 tablet daily, Disp: , Rfl:     atenolol (TENORMIN) 100 mg tablet, TAKE 1 TABLET BY MOUTH TWICE A DAY, Disp: 180 tablet, Rfl: 3    calcium citrate-vitamin d (CALCIUM CITRATE CHEWY BITE) 500-500 MG-UNIT CHEW chewable tablet, Chew, Disp: , Rfl:     Cholecalciferol (VITAMIN D3) 1000 units CAPS, Take by mouth, Disp: , Rfl:     doxycycline (DORYX) 150 MG EC tablet, Take 150 mg by mouth daily, Disp: , Rfl: 6    econazole nitrate 1 % cream, Apply topically daily, Disp: , Rfl:     escitalopram (LEXAPRO) 10 mg tablet, Take 1 tablet (10 mg total) by mouth daily, Disp: 90 tablet, Rfl: 3    famotidine (PEPCID) 40 MG tablet, Take 1 tablet (40 mg total) by mouth daily, Disp: 90 tablet, Rfl: 1    hydrochlorothiazide (HYDRODIURIL) 25 mg tablet, Take 1 tablet (25 mg total) by mouth daily, Disp: 90 tablet, Rfl: 1    hydrocortisone 2 5 % cream, Apply topically, Disp: , Rfl:     ketoconazole (NIZORAL) 2 % cream, APPLY TO SKIN TWICE DAILY AS DIRECTED, Disp: , Rfl: 2    LORazepam (ATIVAN) 1 mg tablet, Take 1 tablet (1 mg total) by mouth 3 (three) times a day, Disp: 90 tablet, Rfl: 5    losartan (COZAAR) 100 MG tablet, Take 1 tablet (100 mg total) by mouth daily, Disp: 90 tablet, Rfl: 1    Magnesium 250 MG TABS, Take by mouth, Disp: , Rfl:     Multiple Vitamin (MULTIVITAMINS PO), Take 1 tablet by mouth daily, Disp: , Rfl:     ondansetron (ZOFRAN) 4 mg tablet, Take 1 tablet (4 mg total) by mouth every 8 (eight) hours as needed for nausea or vomiting, Disp: 20 tablet, Rfl: 0    potassium chloride (Klor-Con 10) 10 mEq tablet, Take 1 tablet (10 mEq total) by mouth daily, Disp: 90 tablet, Rfl: 3    ILEVRO 0 3 % SUSP, INSTILL 1 DROP INTO RIGHT EYE EVERY DAY STARTING DAY OF SURGERY FOR 28 DAYS (Patient not taking: Reported on 12/15/2021), Disp: , Rfl: 1    metroNIDAZOLE (METROGEL) 1 % gel, Apply topically (Patient not taking: Reported on 4/20/2022 ), Disp: , Rfl:   Allergies   Allergen Reactions    Morphine Hallucinations     Past Surgical History:   Procedure Laterality Date  APPENDECTOMY      1998    LYMPHADENECTOMY      PARATHYROIDECTOMY      [place] OPEN TX TRIMALLEOLAR ANKLE FX W/O FIX PST LIP Right 5/17/2021    Procedure: OPEN REDUCTION W/ INTERNAL FIXATION (ORIF) ANKLE;  Surgeon: Huan Canales MD;  Location:  MAIN OR;  Service: Orthopedics    TOOTH EXTRACTION              Physical Exam  Constitutional:       Appearance: He is well-developed  HENT:      Head: Normocephalic and atraumatic  Right Ear: External ear normal       Left Ear: External ear normal       Nose: Nose normal    Eyes:      Conjunctiva/sclera: Conjunctivae normal       Pupils: Pupils are equal, round, and reactive to light  Cardiovascular:      Rate and Rhythm: Normal rate and regular rhythm  Heart sounds: Normal heart sounds  Pulmonary:      Effort: Pulmonary effort is normal       Breath sounds: Normal breath sounds  Abdominal:      General: Bowel sounds are normal       Palpations: Abdomen is soft  Musculoskeletal:      Cervical back: Normal range of motion and neck supple  Skin:     General: Skin is warm and dry  Neurological:      Mental Status: He is alert and oriented to person, place, and time  Deep Tendon Reflexes: Reflexes are normal and symmetric  Psychiatric:         Behavior: Behavior normal          Thought Content:  Thought content normal          Judgment: Judgment normal

## 2022-04-20 NOTE — PROGRESS NOTES
Assessment and Plan:     Problem List Items Addressed This Visit     Essential hypertension    Relevant Medications    atenolol (TENORMIN) 100 mg tablet    hydrochlorothiazide (HYDRODIURIL) 25 mg tablet    losartan (COZAAR) 100 MG tablet    Anxiety    Relevant Medications    ALPRAZolam (XANAX) 0 25 mg tablet    LORazepam (ATIVAN) 1 mg tablet    Thrombocytopenia (HCC)    Obesity, morbid (HCC)    Hyperparathyroidism ([address] )      Other Visit Diagnoses     Medicare annual wellness visit, subsequent    -  Primary    Current mild episode of major depressive disorder without prior episode (HCC)        Relevant Medications    ALPRAZolam (XANAX) 0 25 mg tablet    escitalopram (LEXAPRO) 10 mg tablet    LORazepam (ATIVAN) 1 mg tablet    Heart burn        Relevant Medications    famotidine (PEPCID) 40 MG tablet    Hypercalcemia        Relevant Medications    potassium chloride (Klor-Con 10) 10 mEq tablet        BMI Counseling: Body mass index is 37 61 kg/m²  The BMI is above normal  Nutrition recommendations include decreasing portion sizes, moderation in carbohydrate intake and increasing intake of lean protein  Exercise recommendations include exercising 3-5 times per week  No pharmacotherapy was ordered  Rationale for BMI follow-up plan is due to patient being overweight or obese  Falls Plan of Care: balance, strength, and gait training instructions were provided  Preventive health issues were discussed with patient, and age appropriate screening tests were ordered as noted in patient's After Visit Summary  Personalized health advice and appropriate referrals for health education or preventive services given if needed, as noted in patient's After Visit Summary  History of Present Illness:     Patient presents for Welcome to Medicare visit  Patient Care Team:  John Munguia DO as PCP - [address] [place] [address],      Review of Systems:     Review of Systems   Constitutional: Negative  HENT: Negative  Eyes: Negative  Respiratory: Negative  Cardiovascular: Negative  Gastrointestinal: Negative  Genitourinary: Negative  Musculoskeletal: Negative  Skin: Negative  Neurological: Negative  Psychiatric/Behavioral: Negative         Problem List:     Patient Active Problem List   Diagnosis    Essential hypertension    Anxiety    Recurrent major depressive disorder (HCC)    Thrombocytopenia (HCC)    Erythrocytosis    Splenomegaly    Hyperlipidemia    Obesity, morbid ([facility] )    Closed trimalleolar fracture of right ankle    Hyperparathyroidism [facility])      Past Medical and Surgical History:     Past Medical History:   Diagnosis Date    Cat-scratch disease     Cellulitis     Erysipelas     last assessed-10/14/2013    Parathyroid adenoma     Rosacea     Thrombocytopenia ([facility] )     last assessed-1/16/2017    Tick bites     last assessed-10/14/2013     Past Surgical History:   Procedure Laterality Date    APPENDECTOMY      1998    LYMPHADENECTOMY      PARATHYROIDECTOMY      CT OPEN TX TRIMALLEOLAR ANKLE FX W/O FIX PST LIP Right 5/17/2021    Procedure: OPEN REDUCTION W/ INTERNAL FIXATION (ORIF) ANKLE;  Surgeon: Tamiko Tamez MD;  Location:  MAIN OR;  Service: Orthopedics    TOOTH EXTRACTION        Family History:     Family History   Problem Relation Age of Onset    Aortic aneurysm Mother     Hypertension Mother     Heart attack Father         acute MI    Breast cancer Paternal [de-identified]     Breast cancer Paternal Aunt     Diabetes Paternal Uncle       Social History:     Social History     Socioeconomic History    Marital status: /Civil Union     Spouse name: None    Number of children: None    Years of education: None    Highest education level: None   Occupational History    None   Tobacco Use    Smoking status: Former Smoker    Smokeless tobacco: Never Used   Vaping Use    Vaping Use: Every day    Start date: 2/12/2018    Substances: Nicotine   Substance and Sexual Activity    Alcohol use: No    Drug use: Never    Sexual activity: None   Other Topics Concern    None   Social History Narrative    Advance directive in chart     Social Determinants of Health     Financial Resource Strain: Not on file   Food Insecurity: Not on file   Transportation Needs: Not on file   Physical Activity: Not on file   Stress: Not on file   Social Connections: Not on file   Intimate Partner Violence: Not on file   Housing Stability: Not on file      Medications and Allergies:     Current Outpatient Medications   Medication Sig Dispense Refill    ALPRAZolam (XANAX) 0 25 mg tablet Take 1 tablet (0 25 mg total) by mouth 4 (four) times a day 120 tablet 5    Ascorbic Acid (VITAMIN C) 100 MG CHEW Chew 1 tablet daily      atenolol (TENORMIN) 100 mg tablet Take 1 tablet (100 mg total) by mouth 2 (two) times a day 180 tablet 3    calcium citrate-vitamin d (CALCIUM CITRATE CHEWY BITE) 500-500 MG-UNIT CHEW chewable tablet Chew      Cholecalciferol (VITAMIN D3) 1000 units CAPS Take by mouth      doxycycline (DORYX) 150 MG EC tablet Take 150 mg by mouth daily  6    econazole nitrate 1 % cream Apply topically daily      escitalopram (LEXAPRO) 10 mg tablet Take 1 tablet (10 mg total) by mouth daily 90 tablet 3    famotidine (PEPCID) 40 MG tablet Take 1 tablet (40 mg total) by mouth daily 90 tablet 1    hydrochlorothiazide (HYDRODIURIL) 25 mg tablet Take 1 tablet (25 mg total) by mouth daily 90 tablet 1    hydrocortisone 2 5 % cream Apply topically      ketoconazole (NIZORAL) 2 % cream APPLY TO SKIN TWICE DAILY AS DIRECTED  2    LORazepam (ATIVAN) 1 mg tablet Take 1 tablet (1 mg total) by mouth 3 (three) times a day 90 tablet 5    losartan (COZAAR) 100 MG tablet Take 1 tablet (100 mg total) by mouth daily 90 tablet 1    Magnesium 250 MG TABS Take by mouth      Multiple Vitamin (MULTIVITAMINS PO) Take 1 tablet by mouth daily      ondansetron (ZOFRAN) 4 mg tablet Take 1 tablet (4 mg total) by mouth every 8 (eight) hours as needed for nausea or vomiting 20 tablet 0    potassium chloride (Klor-Con 10) 10 mEq tablet Take 1 tablet (10 mEq total) by mouth daily 90 tablet 3    ILEVRO 0 3 % SUSP INSTILL 1 DROP INTO RIGHT EYE EVERY DAY STARTING DAY OF SURGERY FOR 28 DAYS (Patient not taking: Reported on 12/15/2021)  1    metroNIDAZOLE (METROGEL) 1 % gel Apply topically (Patient not taking: Reported on 4/20/2022 )       No current facility-administered medications for this visit  Allergies   Allergen Reactions    Morphine Hallucinations      Immunizations:     Immunization History   Administered Date(s) Administered    COVID-19 PFIZER VACCINE 0 3 ML IM 04/30/2021, 05/21/2021, 11/27/2021    H1N1, All Formulations 01/09/2010    INFLUENZA 10/22/2007, 10/17/2008, 10/16/2009, 10/22/2010, 09/16/2016, 09/30/2018    Influenza Quadrivalent, 6-35 Months IM 09/16/2016    Influenza Split High Dose Preservative Free IM 10/26/2015, 10/09/2016, 09/30/2018, 09/24/2019    Pneumococcal Polysaccharide PPV23 10/22/2007, 12/23/2013, 09/30/2018    TD (adult) Preservative Free 08/14/2018    Td (adult), adsorbed 06/09/2008, 08/14/2018    Zoster 12/01/2009, 08/03/2017      Health Maintenance:         Topic Date Due    Colorectal Cancer Screening  07/01/2019    Hepatitis C Screening  Completed         Topic Date Due    DTaP,Tdap,and Td Vaccines (1 - Tdap) 08/15/2018      Medicare Screening Tests and Risk Assessments:     Kala Syed is here for his Subsequent Wellness visit  Health Risk Assessment:   Patient rates overall health as very good  Patient feels that their physical health rating is slightly better  Patient is very satisfied with their life  Eyesight was rated as same  Hearing was rated as same  Patient feels that their emotional and mental health rating is same  Patients states they are never, rarely angry  Patient states they are often unusually tired/fatigued   Pain experienced in the last 7 days has been none  Patient states that he has experienced weight loss or gain in last 6 months  Depression Screening:   PHQ-9 Score: 2      Fall Risk Screening: In the past year, patient has experienced: no history of falling in past year      Home Safety:  Patient does not have trouble with stairs inside or outside of their home  Patient has working smoke alarms and has no working carbon monoxide detector  Home safety hazards include: none  Nutrition:   Current diet is Low Carb  Medications:   Patient is currently taking over-the-counter supplements  OTC medications include: see medication list  Patient is able to manage medications  Activities of Daily Living (ADLs)/Instrumental Activities of Daily Living (IADLs):   Walk and transfer into and out of bed and chair?: Yes  Dress and groom yourself?: Yes    Bathe or shower yourself?: Yes    Feed yourself? Yes  Do your laundry/housekeeping?: Yes  Manage your money, pay your bills and track your expenses?: Yes  Make your own meals?: Yes    Do your own shopping?: Yes    Previous Hospitalizations:   Any hospitalizations or ED visits within the last 12 months?: Yes      Advance Care Planning:   Living will: Yes    Durable POA for healthcare:  Yes    Advanced directive: Yes    Advanced directive counseling given: Yes    Five wishes given: No    Patient declined ACP directive: No    End of Life Decisions reviewed with patient: Yes    Provider agrees with end of life decisions: Yes      Cognitive Screening:   Provider or family/friend/caregiver concerned regarding cognition?: No    PREVENTIVE SCREENINGS      Cardiovascular Screening:    General: Screening Not Indicated and History Lipid Disorder      Diabetes Screening:     General: Screening Not Indicated      Colorectal Cancer Screening:     General: Screening Current      Prostate Cancer Screening:    General: Screening Current      Osteoporosis Screening:    General: Screening Not Indicated      Abdominal Aortic Aneurysm (AAA) Screening:    Risk factors include: age between 73-67 yo and tobacco use        General: Screening Not Indicated      Lung Cancer Screening:     General: Screening Not Indicated      Hepatitis C Screening:    General: Screening Current    Screening, Brief Intervention, and Referral to Treatment (SBIRT)    Screening      AUDIT-C Screenin) How often did you have a drink containing alcohol in the past year? never  2) How many drinks did you have on a typical day when you were drinking in the past year? 0  3) How often did you have 6 or more drinks on one occasion in the past year? never    AUDIT-C Score: 0  Interpretation: Score 0-3 (male): Negative screen for alcohol misuse    Single Item Drug Screening:  How often have you used an illegal drug (including marijuana) or a prescription medication for non-medical reasons in the past year? never    Single Item Drug Screen Score: 0  Interpretation: Negative screen for possible drug use disorder    No exam data present     Physical Exam:     /78 (BP Location: Left arm, Patient Position: Sitting, Cuff Size: Large)   Pulse 81   Temp 97 5 °F (36 4 °C) (Temporal)   Ht 5' 5" (1 651 m)   Wt 103 kg (226 lb)   SpO2 97%   BMI 37 61 kg/m²     Physical Exam  Vitals and nursing note reviewed  Constitutional:       Appearance: He is well-developed  HENT:      Head: Normocephalic and atraumatic  Right Ear: Tympanic membrane, ear canal and external ear normal       Left Ear: Tympanic membrane, ear canal and external ear normal       Mouth/Throat:      Mouth: Mucous membranes are moist       Pharynx: Oropharynx is clear  Eyes:      Conjunctiva/sclera: Conjunctivae normal    Cardiovascular:      Rate and Rhythm: Normal rate and regular rhythm  Heart sounds: No murmur heard  Pulmonary:      Effort: Pulmonary effort is normal  No respiratory distress  Breath sounds: Normal breath sounds     Abdominal:      General: Bowel sounds are normal       Palpations: Abdomen is soft  Tenderness: There is no abdominal tenderness  Musculoskeletal:      Cervical back: Neck supple  Skin:     General: Skin is warm and dry  Neurological:      General: No focal deficit present  Mental Status: He is alert and oriented to person, place, and time  Psychiatric:         Mood and Affect: Mood normal          Behavior: Behavior normal          Thought Content:  Thought content normal          Judgment: Judgment normal           Kary Lundborg, DO

## 2022-04-21 LAB
25(OH)D3 SERPL-MCNC: 42 NG/ML (ref 30–100)
ALBUMIN SERPL-MCNC: 4.4 G/DL (ref 3.6–5.1)
ALBUMIN/GLOB SERPL: 2 (CALC) (ref 1–2.5)
ALP SERPL-CCNC: 55 U/L (ref 35–144)
ALT SERPL-CCNC: 25 U/L (ref 9–46)
AST SERPL-CCNC: 22 U/L (ref 10–35)
BASOPHILS # BLD AUTO: 28 CELLS/UL (ref 0–200)
BASOPHILS NFR BLD AUTO: 0.5 %
BILIRUB DIRECT SERPL-MCNC: 0.3 MG/DL
BILIRUB INDIRECT SERPL-MCNC: 2.1 MG/DL (CALC) (ref 0.2–1.2)
BILIRUB SERPL-MCNC: 2.4 MG/DL (ref 0.2–1.2)
BUN SERPL-MCNC: 30 MG/DL (ref 7–25)
BUN/CREAT SERPL: 30 (CALC) (ref 6–22)
CALCIUM SERPL-MCNC: 10 MG/DL (ref 8.6–10.3)
CHLORIDE SERPL-SCNC: 99 MMOL/L (ref 98–110)
CHOLEST SERPL-MCNC: 182 MG/DL
CHOLEST/HDLC SERPL: 5.4 (CALC)
CO2 SERPL-SCNC: 34 MMOL/L (ref 20–32)
CREAT SERPL-MCNC: 0.99 MG/DL (ref 0.7–1.18)
EOSINOPHIL # BLD AUTO: 121 CELLS/UL (ref 15–500)
EOSINOPHIL NFR BLD AUTO: 2.2 %
ERYTHROCYTE [DISTWIDTH] IN BLOOD BY AUTOMATED COUNT: 12.6 % (ref 11–15)
GLOBULIN SER CALC-MCNC: 2.2 G/DL (CALC) (ref 1.9–3.7)
GLUCOSE SERPL-MCNC: 116 MG/DL (ref 65–99)
HCT VFR BLD AUTO: 49.7 % (ref 38.5–50)
HDLC SERPL-MCNC: 34 MG/DL
HGB BLD-MCNC: 17.2 G/DL (ref 13.2–17.1)
LYMPHOCYTES # BLD AUTO: 1216 CELLS/UL (ref 850–3900)
LYMPHOCYTES NFR BLD AUTO: 22.1 %
MCH RBC QN AUTO: 32.3 PG (ref 27–33)
MCHC RBC AUTO-ENTMCNC: 34.6 G/DL (ref 32–36)
MCV RBC AUTO: 93.4 FL (ref 80–100)
MONOCYTES # BLD AUTO: 677 CELLS/UL (ref 200–950)
MONOCYTES NFR BLD AUTO: 12.3 %
NEUTROPHILS # BLD AUTO: 3460 CELLS/UL (ref 1500–7800)
NEUTROPHILS NFR BLD AUTO: 62.9 %
NONHDLC SERPL-MCNC: 148 MG/DL (CALC)
PLATELET # BLD AUTO: 91 THOUSAND/UL (ref 140–400)
PMV BLD REES-ECKER: 11.7 FL (ref 7.5–12.5)
POTASSIUM SERPL-SCNC: 3.6 MMOL/L (ref 3.5–5.3)
PROT SERPL-MCNC: 6.6 G/DL (ref 6.1–8.1)
PSA SERPL-MCNC: 0.79 NG/ML
RBC # BLD AUTO: 5.32 MILLION/UL (ref 4.2–5.8)
SERVICE CMNT-IMP: ABNORMAL
SL AMB EGFR AFRICAN AMERICAN: 87 ML/MIN/1.73M2
SL AMB EGFR NON AFRICAN AMERICAN: 75 ML/MIN/1.73M2
SL AMB PLATELET ESTIMATION: ABNORMAL
SODIUM SERPL-SCNC: 141 MMOL/L (ref 135–146)
TRIGL SERPL-MCNC: 644 MG/DL
WBC # BLD AUTO: 5.5 THOUSAND/UL (ref 3.8–10.8)

## 2022-04-30 ENCOUNTER — IMMUNIZATIONS (OUTPATIENT)
Dept: FAMILY MEDICINE CLINIC | Facility: HOSPITAL | Age: 72
End: 2022-04-30

## 2022-04-30 PROCEDURE — 91305 COVID-19 PFIZER VACC TRIS-SUCROSE GRAY CAP 0.3 ML: CPT

## 2022-04-30 PROCEDURE — 0054A COVID-19 PFIZER VACC TRIS-SUCROSE GRAY CAP 0.3 ML: CPT

## 2022-05-09 ENCOUNTER — OFFICE VISIT (OUTPATIENT)
Dept: HEMATOLOGY ONCOLOGY | Facility: CLINIC | Age: 72
End: 2022-05-09
Payer: COMMERCIAL

## 2022-05-09 VITALS
TEMPERATURE: 97.3 F | OXYGEN SATURATION: 96 % | RESPIRATION RATE: 18 BRPM | BODY MASS INDEX: 37.15 KG/M2 | WEIGHT: 223 LBS | SYSTOLIC BLOOD PRESSURE: 128 MMHG | HEART RATE: 77 BPM | HEIGHT: 65 IN | DIASTOLIC BLOOD PRESSURE: 78 MMHG

## 2022-05-09 DIAGNOSIS — D69.6 THROMBOCYTOPENIA (HCC): Primary | ICD-10-CM

## 2022-05-09 PROCEDURE — 3078F DIAST BP <80 MM HG: CPT | Performed by: PHYSICIAN ASSISTANT

## 2022-05-09 PROCEDURE — 1036F TOBACCO NON-USER: CPT | Performed by: PHYSICIAN ASSISTANT

## 2022-05-09 PROCEDURE — 3008F BODY MASS INDEX DOCD: CPT | Performed by: PHYSICIAN ASSISTANT

## 2022-05-09 PROCEDURE — 99213 OFFICE O/P EST LOW 20 MIN: CPT | Performed by: PHYSICIAN ASSISTANT

## 2022-05-09 PROCEDURE — 1160F RVW MEDS BY RX/DR IN RCRD: CPT | Performed by: PHYSICIAN ASSISTANT

## 2022-05-09 PROCEDURE — 3074F SYST BP LT 130 MM HG: CPT | Performed by: PHYSICIAN ASSISTANT

## 2022-05-09 NOTE — PROGRESS NOTES
Hematology/Oncology Outpatient Follow-up  Juju Davis  66 y o  male 1950 [id number]    Date:  5/9/2022      Assessment and Plan:  1  Thrombocytopenia ([facility] [address] )  66year old female presents for follow up for history of thrombocytopenia  This is chronic/stable; platelets 96,630  Continue monitoring is favored  Follow up in 1 year  - CBC and differential; Future      HPI:  64-year-old male presents for follow-up regarding mild thrombocytopenia  This has been fluctuating between approximately 80,000 - 120,000 since June 2018  Likely patient has chronic ITP     Spleen was normal size 11 9 cm      JAK2 mutation negative  Interval history: no new complaints     ROS: Review of Systems   Constitutional: Negative for activity change, appetite change, chills, fatigue, fever and unexpected weight change  Respiratory: Negative for cough and shortness of breath  Cardiovascular: Negative for chest pain, palpitations and leg swelling  Gastrointestinal: Negative for abdominal pain, constipation, diarrhea, nausea and vomiting  Genitourinary: Negative for difficulty urinating, dysuria and hematuria  Musculoskeletal: Negative for arthralgias  Skin: Negative  Neurological: Negative for dizziness, weakness, light-headedness, numbness and headaches  Hematological: Negative  Psychiatric/Behavioral: Negative          Past Medical History:   Diagnosis Date    Cat-scratch disease     Cellulitis     Erysipelas     last assessed-10/14/2013    Parathyroid adenoma     Rosacea     Thrombocytopenia ([facility] )     last assessed-1/16/2017    Tick bites     last assessed-10/14/2013       Past Surgical History:   Procedure Laterality Date    APPENDECTOMY      1998    LYMPHADENECTOMY      PARATHYROIDECTOMY      [place] OPEN TX TRIMALLEOLAR ANKLE FX W/O FIX PST LIP Right 5/17/2021    Procedure: OPEN REDUCTION W/ INTERNAL FIXATION (ORIF) ANKLE;  Surgeon: Delmi Mueller MD;  Location:  MAIN OR;  Service: Orthopedics    TOOTH EXTRACTION         Social History     Socioeconomic History    Marital status: /Civil Union     Spouse name: None    Number of children: None    Years of education: None    Highest education level: None   Occupational History    None   Tobacco Use    Smoking status: Former Smoker    Smokeless tobacco: Never Used   Vaping Use    Vaping Use: Every day    Start date: 2/12/2018    Substances: Nicotine   Substance and Sexual Activity    Alcohol use: No    Drug use: Never    Sexual activity: None   Other Topics Concern    None   Social History Narrative    Advance directive in chart     Social Determinants of Health     Financial Resource Strain: Not on file   Food Insecurity: Not on file   Transportation Needs: Not on file   Physical Activity: Not on file   Stress: Not on file   Social Connections: Not on file   Intimate Partner Violence: Not on file   Housing Stability: Not on file       Family History   Problem Relation Age of Onset    Aortic aneurysm Mother     Hypertension Mother     Heart attack Father         acute MI    Breast cancer Paternal Grandmother     Breast cancer Paternal Aunt     Diabetes Paternal Uncle        Allergies   Allergen Reactions    Morphine Hallucinations         Current Outpatient Medications:     ALPRAZolam (XANAX) 0 25 mg tablet, Take 1 tablet (0 25 mg total) by mouth 4 (four) times a day, Disp: 120 tablet, Rfl: 5    Ascorbic Acid (VITAMIN C) 100 MG CHEW, Chew 1 tablet daily, Disp: , Rfl:     atenolol (TENORMIN) 100 mg tablet, Take 1 tablet (100 mg total) by mouth 2 (two) times a day, Disp: 180 tablet, Rfl: 3    calcium citrate-vitamin d (CALCIUM CITRATE CHEWY BITE) 500-500 MG-UNIT CHEW chewable tablet, Chew, Disp: , Rfl:     Cholecalciferol (VITAMIN D3) 1000 units CAPS, Take by mouth, Disp: , Rfl:     doxycycline (DORYX) 150 MG EC tablet, Take 150 mg by mouth daily, Disp: , Rfl: 6    econazole nitrate 1 % cream, Apply topically daily, Disp: , Rfl:     escitalopram (LEXAPRO) 10 mg tablet, Take 1 tablet (10 mg total) by mouth daily, Disp: 90 tablet, Rfl: 3    famotidine (PEPCID) 40 MG tablet, Take 1 tablet (40 mg total) by mouth daily, Disp: 90 tablet, Rfl: 1    hydrochlorothiazide (HYDRODIURIL) 25 mg tablet, Take 1 tablet (25 mg total) by mouth daily, Disp: 90 tablet, Rfl: 1    hydrocortisone 2 5 % cream, Apply topically, Disp: , Rfl:     ketoconazole (NIZORAL) 2 % cream, APPLY TO SKIN TWICE DAILY AS DIRECTED, Disp: , Rfl: 2    LORazepam (ATIVAN) 1 mg tablet, Take 1 tablet (1 mg total) by mouth 3 (three) times a day, Disp: 90 tablet, Rfl: 5    losartan (COZAAR) 100 MG tablet, Take 1 tablet (100 mg total) by mouth daily, Disp: 90 tablet, Rfl: 1    Magnesium 250 MG TABS, Take by mouth, Disp: , Rfl:     Multiple Vitamin (MULTIVITAMINS PO), Take 1 tablet by mouth daily, Disp: , Rfl:     ondansetron (ZOFRAN) 4 mg tablet, Take 1 tablet (4 mg total) by mouth every 8 (eight) hours as needed for nausea or vomiting, Disp: 20 tablet, Rfl: 0    potassium chloride (Klor-Con 10) 10 mEq tablet, Take 1 tablet (10 mEq total) by mouth daily, Disp: 90 tablet, Rfl: 3    ILEVRO 0 3 % SUSP, INSTILL 1 DROP INTO RIGHT EYE EVERY DAY STARTING DAY OF SURGERY FOR 28 DAYS (Patient not taking: Reported on 12/15/2021), Disp: , Rfl: 1    metroNIDAZOLE (METROGEL) 1 % gel, Apply topically (Patient not taking: Reported on 4/20/2022 ), Disp: , Rfl:       Physical Exam:  /78 (BP Location: Right arm, Patient Position: Sitting, Cuff Size: Adult)   Pulse 77   Temp (!) 97 3 °F (36 3 °C)   Resp 18   Ht 5' 5" (1 651 m)   Wt 101 kg (223 lb)   SpO2 96%   BMI 37 11 kg/m²     Physical Exam  Vitals reviewed  Constitutional:       General: He is not in acute distress  Appearance: He is well-developed  HENT:      Head: Normocephalic and atraumatic  Eyes:      General: No scleral icterus       Conjunctiva/sclera: Conjunctivae normal    Cardiovascular: Rate and Rhythm: Normal rate and regular rhythm  Heart sounds: Normal heart sounds  No murmur heard  Pulmonary:      Effort: Pulmonary effort is normal  No respiratory distress  Breath sounds: Normal breath sounds  Abdominal:      Palpations: Abdomen is soft  Tenderness: There is no abdominal tenderness  Musculoskeletal:         General: No tenderness  Normal range of motion  Cervical back: Normal range of motion and neck supple  Right lower leg: No edema  Left lower leg: No edema  Lymphadenopathy:      Cervical: No cervical adenopathy  Skin:     General: Skin is warm and dry  Neurological:      Mental Status: He is alert and oriented to person, place, and time  Cranial Nerves: No cranial nerve deficit  Psychiatric:         Mood and Affect: Mood normal          Behavior: Behavior normal        Labs:  Lab Results   Component Value Date    WBC 5 5 04/20/2022    HGB 17 2 (H) 04/20/2022    HCT 49 7 04/20/2022    MCV 93 4 04/20/2022    PLT 91 (L) 04/20/2022     Lab Results   Component Value Date     06/10/2017    K 3 6 04/20/2022    CL 99 04/20/2022    CO2 34 (H) 04/20/2022    ANIONGAP 6 04/16/2013    BUN 30 (H) 04/20/2022    CREATININE 0 99 04/20/2022    CALCIUM 10 0 04/20/2022    AST 22 04/20/2022    ALT 25 04/20/2022    ALKPHOS 55 04/20/2022    PROT 6 3 12/23/2016    BILITOT 1 2 12/23/2016       Patient voiced understanding and agreement in the above discussion  Aware to contact our office with questions/symptoms in the interim  This note has been generated by voice recognition software system  Therefore, there may be spelling, grammar, and or syntax errors  Please contact if questions arise

## 2022-08-23 LAB
BASOPHILS # BLD AUTO: 41 CELLS/UL (ref 0–200)
BASOPHILS NFR BLD AUTO: 0.7 %
EOSINOPHIL # BLD AUTO: 130 CELLS/UL (ref 15–500)
EOSINOPHIL NFR BLD AUTO: 2.2 %
ERYTHROCYTE [DISTWIDTH] IN BLOOD BY AUTOMATED COUNT: 12.2 % (ref 11–15)
HCT VFR BLD AUTO: 48.8 % (ref 38.5–50)
HGB BLD-MCNC: 17.1 G/DL (ref 13.2–17.1)
LYMPHOCYTES # BLD AUTO: 1121 CELLS/UL (ref 850–3900)
LYMPHOCYTES NFR BLD AUTO: 19 %
MCH RBC QN AUTO: 33.1 PG (ref 27–33)
MCHC RBC AUTO-ENTMCNC: 35 G/DL (ref 32–36)
MCV RBC AUTO: 94.4 FL (ref 80–100)
MONOCYTES # BLD AUTO: 620 CELLS/UL (ref 200–950)
MONOCYTES NFR BLD AUTO: 10.5 %
NEUTROPHILS # BLD AUTO: 3988 CELLS/UL (ref 1500–7800)
NEUTROPHILS NFR BLD AUTO: 67.6 %
PLATELET # BLD AUTO: 68 THOUSAND/UL (ref 140–400)
PMV BLD REES-ECKER: 11.1 FL (ref 7.5–12.5)
RBC # BLD AUTO: 5.17 MILLION/UL (ref 4.2–5.8)
SERVICE CMNT-IMP: ABNORMAL
WBC # BLD AUTO: 5.9 THOUSAND/UL (ref 3.8–10.8)

## 2022-08-24 ENCOUNTER — OFFICE VISIT (OUTPATIENT)
Dept: FAMILY MEDICINE CLINIC | Facility: CLINIC | Age: 72
End: 2022-08-24
Payer: COMMERCIAL

## 2022-08-24 VITALS
WEIGHT: 219 LBS | DIASTOLIC BLOOD PRESSURE: 80 MMHG | TEMPERATURE: 98.4 F | OXYGEN SATURATION: 96 % | HEIGHT: 65 IN | HEART RATE: 82 BPM | SYSTOLIC BLOOD PRESSURE: 130 MMHG | BODY MASS INDEX: 36.49 KG/M2

## 2022-08-24 DIAGNOSIS — E78.1 HYPERTRIGLYCERIDEMIA: ICD-10-CM

## 2022-08-24 DIAGNOSIS — F33.0 MILD EPISODE OF RECURRENT MAJOR DEPRESSIVE DISORDER (HCC): ICD-10-CM

## 2022-08-24 DIAGNOSIS — F41.9 ANXIETY: ICD-10-CM

## 2022-08-24 DIAGNOSIS — D69.6 THROMBOCYTOPENIA (HCC): Primary | ICD-10-CM

## 2022-08-24 DIAGNOSIS — I10 ESSENTIAL HYPERTENSION: ICD-10-CM

## 2022-08-24 PROCEDURE — 99214 OFFICE O/P EST MOD 30 MIN: CPT | Performed by: FAMILY MEDICINE

## 2022-08-24 PROCEDURE — 3079F DIAST BP 80-89 MM HG: CPT | Performed by: FAMILY MEDICINE

## 2022-08-24 PROCEDURE — 3075F SYST BP GE 130 - 139MM HG: CPT | Performed by: FAMILY MEDICINE

## 2022-08-24 PROCEDURE — 1160F RVW MEDS BY RX/DR IN RCRD: CPT | Performed by: FAMILY MEDICINE

## 2022-08-24 RX ORDER — LORAZEPAM 1 MG/1
1 TABLET ORAL 3 TIMES DAILY
Qty: 90 TABLET | Refills: 5 | Status: SHIPPED | OUTPATIENT
Start: 2022-08-24 | End: 2023-02-20

## 2022-08-24 RX ORDER — CEPHALEXIN 500 MG/1
500 CAPSULE ORAL 2 TIMES DAILY
COMMUNITY
Start: 2022-08-15

## 2022-08-24 RX ORDER — ALPRAZOLAM 0.25 MG/1
0.25 TABLET ORAL 4 TIMES DAILY
Qty: 120 TABLET | Refills: 5 | Status: SHIPPED | OUTPATIENT
Start: 2022-08-24 | End: 2023-02-20

## 2022-08-24 RX ORDER — LOSARTAN POTASSIUM 100 MG/1
100 TABLET ORAL DAILY
Qty: 90 TABLET | Refills: 1 | Status: SHIPPED | OUTPATIENT
Start: 2022-08-24

## 2022-08-24 NOTE — PROGRESS NOTES
Chief Complaint   Patient presents with    Follow-up    Hypertension     Assessment/Plan:  Avoid processed foods- want the triglycerides lower  Diagnoses and all orders for this visit:    Thrombocytopenia ([facility] [address] )    Anxiety  -     ALPRAZolam (XANAX) 0 25 mg tablet; Take 1 tablet (0 25 mg total) by mouth 4 (four) times a day  -     LORazepam (ATIVAN) 1 mg tablet; Take 1 tablet (1 mg total) by mouth 3 (three) times a day    Essential hypertension  -     losartan (COZAAR) 100 MG tablet; Take 1 tablet (100 mg total) by mouth daily    Hypertriglyceridemia    Mild episode of recurrent major depressive disorder ([facility] )    Other orders  -     cephalexin (KEFLEX) 500 mg capsule; Take 500 mg by mouth 2 (two) times a day (Patient not taking: Reported on 8/24/2022)          Subjective:      Patient ID: Maninder Capellan  is a 74 y o  male  Review labs, refill med's  SH: Swimming 3 days a week, sleeping better, feeling better  Foot and ankle are great  Platelets low, follows with hematology  Anxiety, BP, reflux and depression controlled  SH: Neg OH, + vapes - nicotine  The following portions of the patient's history were reviewed and updated as appropriate: allergies, current medications, past medical history, past social history, past surgical history and problem list     Review of Systems   Constitutional: Negative  HENT: Negative  Eyes: Negative  Respiratory: Negative  Cardiovascular: Negative  Gastrointestinal: Negative  Genitourinary: Negative  Musculoskeletal: Negative  Skin: Negative  Neurological: Negative  Psychiatric/Behavioral: Negative            Objective:      /80 (BP Location: Left arm, Patient Position: Sitting, Cuff Size: Large)   Pulse 82   Temp 98 4 °F (36 9 °C) (Tympanic)   Ht 5' 5" (1 651 m)   Wt 99 3 kg (219 lb)   SpO2 96%   BMI 36 44 kg/m²       Current Outpatient Medications:     ALPRAZolam (XANAX) 0 25 mg tablet, Take 1 tablet (0 25 mg total) by mouth 4 (four) times a day, Disp: 120 tablet, Rfl: 5    Ascorbic Acid (VITAMIN C) 100 MG CHEW, Chew 1 tablet daily, Disp: , Rfl:     atenolol (TENORMIN) 100 mg tablet, Take 1 tablet (100 mg total) by mouth 2 (two) times a day, Disp: 180 tablet, Rfl: 3    calcium citrate-vitamin d 500-500 MG-UNIT CHEW chewable tablet, Chew, Disp: , Rfl:     Cholecalciferol (VITAMIN D3) 1000 units CAPS, Take by mouth, Disp: , Rfl:     doxycycline (DORYX) 150 MG EC tablet, Take 150 mg by mouth daily, Disp: , Rfl: 6    econazole nitrate 1 % cream, Apply topically daily, Disp: , Rfl:     escitalopram (LEXAPRO) 10 mg tablet, Take 1 tablet (10 mg total) by mouth daily, Disp: 90 tablet, Rfl: 3    famotidine (PEPCID) 40 MG tablet, Take 1 tablet (40 mg total) by mouth daily, Disp: 90 tablet, Rfl: 1    hydrochlorothiazide (HYDRODIURIL) 25 mg tablet, Take 1 tablet (25 mg total) by mouth daily, Disp: 90 tablet, Rfl: 1    hydrocortisone 2 5 % cream, Apply topically, Disp: , Rfl:     ketoconazole (NIZORAL) 2 % cream, APPLY TO SKIN TWICE DAILY AS DIRECTED, Disp: , Rfl: 2    LORazepam (ATIVAN) 1 mg tablet, Take 1 tablet (1 mg total) by mouth 3 (three) times a day, Disp: 90 tablet, Rfl: 5    losartan (COZAAR) 100 MG tablet, Take 1 tablet (100 mg total) by mouth daily, Disp: 90 tablet, Rfl: 1    Magnesium 250 MG TABS, Take by mouth, Disp: , Rfl:     metroNIDAZOLE (METROGEL) 1 % gel, Apply topically, Disp: , Rfl:     Multiple Vitamin (MULTIVITAMINS PO), Take 1 tablet by mouth daily, Disp: , Rfl:     ondansetron (ZOFRAN) 4 mg tablet, Take 1 tablet (4 mg total) by mouth every 8 (eight) hours as needed for nausea or vomiting, Disp: 20 tablet, Rfl: 0    potassium chloride (Klor-Con 10) 10 mEq tablet, Take 1 tablet (10 mEq total) by mouth daily, Disp: 90 tablet, Rfl: 3    cephalexin (KEFLEX) 500 mg capsule, Take 500 mg by mouth 2 (two) times a day (Patient not taking: Reported on 8/24/2022), Disp: , Rfl:     ILEVRO 0 3 % SUSP, INSTILL 1 DROP INTO RIGHT EYE EVERY DAY STARTING DAY OF SURGERY FOR 28 DAYS (Patient not taking: Reported on 12/15/2021), Disp: , Rfl: 1  Allergies   Allergen Reactions    Morphine Hallucinations     Past Surgical History:   Procedure Laterality Date    APPENDECTOMY      1998    LYMPHADENECTOMY      PARATHYROIDECTOMY      [place] OPEN TX TRIMALLEOLAR ANKLE FX W/O FIX PST LIP Right 5/17/2021    Procedure: OPEN REDUCTION W/ INTERNAL FIXATION (ORIF) ANKLE;  Surgeon: Jose Rees MD;  Location:  MAIN OR;  Service: Orthopedics    TOOTH EXTRACTION            Physical Exam  Constitutional:       Appearance: He is well-developed  HENT:      Head: Normocephalic and atraumatic  Right Ear: Tympanic membrane, ear canal and external ear normal       Left Ear: Tympanic membrane, ear canal and external ear normal       Nose: Nose normal       Mouth/Throat:      Mouth: Mucous membranes are moist       Pharynx: Oropharynx is clear  Eyes:      Conjunctiva/sclera: Conjunctivae normal       Pupils: Pupils are equal, round, and reactive to light  Cardiovascular:      Rate and Rhythm: Normal rate and regular rhythm  Pulses: Normal pulses  Heart sounds: Normal heart sounds  Pulmonary:      Effort: Pulmonary effort is normal       Breath sounds: Normal breath sounds  Abdominal:      General: Abdomen is flat  Bowel sounds are normal       Palpations: Abdomen is soft  Musculoskeletal:      Cervical back: Normal range of motion and neck supple  Skin:     General: Skin is warm and dry  Neurological:      Mental Status: He is alert and oriented to person, place, and time  Deep Tendon Reflexes: Reflexes are normal and symmetric  Psychiatric:         Mood and Affect: Mood normal          Behavior: Behavior normal          Thought Content:  Thought content normal          Judgment: Judgment normal

## 2022-08-25 PROBLEM — F33.0 MILD EPISODE OF RECURRENT MAJOR DEPRESSIVE DISORDER (HCC): Status: ACTIVE | Noted: 2022-08-25

## 2022-09-23 ENCOUNTER — RA CDI HCC (OUTPATIENT)
Dept: OTHER | Facility: HOSPITAL | Age: 72
End: 2022-09-23

## 2022-09-23 NOTE — PROGRESS NOTES
[facility]  coding opportunities       Chart reviewed, no opportunity found:   [address]        Patients Insurance     Medicare Insurance: [facility]

## 2022-11-16 ENCOUNTER — VBI (OUTPATIENT)
Dept: ADMINISTRATIVE | Facility: OTHER | Age: 72
End: 2022-11-16

## 2022-12-14 ENCOUNTER — OFFICE VISIT (OUTPATIENT)
Dept: FAMILY MEDICINE CLINIC | Facility: CLINIC | Age: 72
End: 2022-12-14

## 2022-12-14 VITALS
OXYGEN SATURATION: 95 % | HEART RATE: 81 BPM | HEIGHT: 65 IN | BODY MASS INDEX: 36.32 KG/M2 | WEIGHT: 218 LBS | SYSTOLIC BLOOD PRESSURE: 134 MMHG | TEMPERATURE: 97.5 F | DIASTOLIC BLOOD PRESSURE: 80 MMHG

## 2022-12-14 DIAGNOSIS — F41.9 ANXIETY: ICD-10-CM

## 2022-12-14 DIAGNOSIS — R16.1 SPLENOMEGALY: ICD-10-CM

## 2022-12-14 DIAGNOSIS — F33.0 MILD EPISODE OF RECURRENT MAJOR DEPRESSIVE DISORDER (HCC): ICD-10-CM

## 2022-12-14 DIAGNOSIS — I10 ESSENTIAL HYPERTENSION: ICD-10-CM

## 2022-12-14 DIAGNOSIS — D69.6 THROMBOCYTOPENIA (HCC): Primary | ICD-10-CM

## 2022-12-14 RX ORDER — ALPRAZOLAM 0.25 MG/1
0.25 TABLET ORAL 4 TIMES DAILY
Qty: 120 TABLET | Refills: 5 | Status: SHIPPED | OUTPATIENT
Start: 2022-12-14 | End: 2023-06-12

## 2022-12-14 RX ORDER — METRONIDAZOLE 7.5 MG/G
GEL TOPICAL
COMMUNITY
Start: 2022-11-17

## 2022-12-14 RX ORDER — LOSARTAN POTASSIUM 100 MG/1
100 TABLET ORAL DAILY
Qty: 90 TABLET | Refills: 3 | Status: SHIPPED | OUTPATIENT
Start: 2022-12-14

## 2022-12-14 RX ORDER — LORAZEPAM 1 MG/1
1 TABLET ORAL 3 TIMES DAILY
Qty: 90 TABLET | Refills: 5 | Status: SHIPPED | OUTPATIENT
Start: 2022-12-14 | End: 2023-06-12

## 2022-12-14 NOTE — PROGRESS NOTES
Name: Rodney Pandya  : 1950      MRN: [id number]  Encounter Provider: Ambar Guevara DO  Encounter Date: 2022   Encounter department: [facility]  of abdomen prior to Heme visit  Chief Complaint   Patient presents with   • Follow-up   • Hypertension   • Hyperparathyroidism       BMI Counseling: Body mass index is 36 28 kg/m²  The BMI is above normal  Nutrition recommendations include reducing portion sizes  Subjective     Labs and Refill  Has appt with hematology scheduled  I have spent 40 minutes with Patient  today in which greater than 50% of this time was spent in counseling/coordination of care regarding Diagnostic results, Risks and benefits of tx options, Intructions for management, Patient and family education, Risk factor reductions and Impressions  Review of Systems   Constitutional: Negative  HENT: Negative  Eyes: Negative  Respiratory: Negative  Cardiovascular: Negative  Gastrointestinal: Negative  Genitourinary: Negative  Musculoskeletal: Negative  Skin: Negative  Neurological: Negative  Psychiatric/Behavioral: Negative          Past Medical History:   Diagnosis Date   • Cat-scratch disease    • Cellulitis    • Erysipelas     last assessed-10/14/2013   • Parathyroid adenoma    • Rosacea    • Thrombocytopenia ([facility] [address] )     last assessed-2017   • Tick bites     last assessed-10/14/2013     Past Surgical History:   Procedure Laterality Date   • APPENDECTOMY         • LYMPHADENECTOMY     • PARATHYROIDECTOMY     • [place] OPEN TX TRIMALLEOLAR ANKLE FX W/O FIX PST LIP Right 2021    Procedure: OPEN REDUCTION W/ INTERNAL FIXATION (ORIF) ANKLE;  Surgeon: Richard Romano MD;  Location:  MAIN OR;  Service: Orthopedics   • TOOTH EXTRACTION       Family History   Problem Relation Age of Onset   • Aortic aneurysm Mother    • Hypertension Mother    • Heart attack Father         acute MI   • Breast cancer Paternal Grandmother    • Breast cancer Paternal Aunt    • Diabetes Paternal Uncle      Social History     Socioeconomic History   • Marital status: /Civil Union     Spouse name: None   • Number of children: None   • Years of education: None   • Highest education level: None   Occupational History   • None   Tobacco Use   • Smoking status: Former   • Smokeless tobacco: Never   Vaping Use   • Vaping Use: Every day   • Start date: 2/12/2018   • Substances: Nicotine   Substance and Sexual Activity   • Alcohol use: No   • Drug use: Never   • Sexual activity: None   Other Topics Concern   • None   Social History Narrative    Advance directive in chart     Social Determinants of Health     Financial Resource Strain: Not on file   Food Insecurity: Not on file   Transportation Needs: Not on file   Physical Activity: Not on file   Stress: Not on file   Social Connections: Not on file   Intimate Partner Violence: Not on file   Housing Stability: Not on file     Current Outpatient Medications on File Prior to Visit   Medication Sig   • Ascorbic Acid (VITAMIN C) 100 MG CHEW Chew 1 tablet daily   • atenolol (TENORMIN) 100 mg tablet Take 1 tablet (100 mg total) by mouth 2 (two) times a day   • calcium citrate-vitamin d 500-500 MG-UNIT CHEW chewable tablet Chew   • Cholecalciferol (VITAMIN D3) 1000 units CAPS Take by mouth   • doxycycline (DORYX) 150 MG EC tablet Take 150 mg by mouth daily   • econazole nitrate 1 % cream Apply topically daily   • escitalopram (LEXAPRO) 10 mg tablet Take 1 tablet (10 mg total) by mouth daily   • famotidine (PEPCID) 40 MG tablet Take 1 tablet (40 mg total) by mouth daily   • hydrocortisone 2 5 % cream Apply topically   • ketoconazole (NIZORAL) 2 % cream APPLY TO SKIN TWICE DAILY AS DIRECTED   • Magnesium 250 MG TABS Take by mouth   • metroNIDAZOLE (METROGEL) 0 75 % gel APPLY TO SKIN EVERY DAY   • metroNIDAZOLE (METROGEL) 1 % gel Apply topically   • Multiple Vitamin (MULTIVITAMINS PO) Take 1 tablet by mouth daily   • ondansetron (ZOFRAN) 4 mg tablet Take 1 tablet (4 mg total) by mouth every 8 (eight) hours as needed for nausea or vomiting   • potassium chloride (Klor-Con 10) 10 mEq tablet Take 1 tablet (10 mEq total) by mouth daily   • cephalexin (KEFLEX) 500 mg capsule Take 500 mg by mouth 2 (two) times a day (Patient not taking: Reported on 12/14/2022)   • hydrochlorothiazide (HYDRODIURIL) 25 mg tablet Take 1 tablet (25 mg total) by mouth daily (Patient not taking: Reported on 12/14/2022)   • ILEVRO 0 3 % SUSP INSTILL 1 DROP INTO RIGHT EYE EVERY DAY STARTING DAY OF SURGERY FOR 28 DAYS (Patient not taking: Reported on 12/14/2022)     Allergies   Allergen Reactions   • Morphine Hallucinations     Immunization History   Administered Date(s) Administered   • COVID-19 PFIZER VACCINE 0 3 ML IM 04/30/2021, 05/21/2021, 11/27/2021   • COVID-19 Pfizer vac (Ronald-sucrose, gray cap) 12 yr+ IM 04/30/2022   • H1N1, All Formulations 01/09/2010   • INFLUENZA 10/22/2007, 10/17/2008, 10/16/2009, 10/22/2010, 09/16/2016, 09/30/2018, 09/05/2020, 10/10/2021   • Influenza Quadrivalent, 6-35 Months IM 09/16/2016   • Influenza Split High Dose Preservative Free IM 10/26/2015, 10/09/2016, 09/30/2018, 09/24/2019   • Pneumococcal Polysaccharide PPV23 10/22/2007, 12/23/2013, 09/30/2018   • TD (adult) Preservative Free 08/14/2018   • Td (adult), adsorbed 06/09/2008, 08/14/2018   • Zoster 12/01/2009, 08/03/2017       Objective     /80 (BP Location: Left arm, Patient Position: Sitting, Cuff Size: Standard)   Pulse 81   Temp 97 5 °F (36 4 °C) (Temporal)   Ht 5' 5" (1 651 m)   Wt 98 9 kg (218 lb)   SpO2 95%   BMI 36 28 kg/m²     Physical Exam  Constitutional:       Appearance: He is well-developed  HENT:      Head: Normocephalic and atraumatic        Right Ear: External ear normal       Left Ear: External ear normal       Nose: Nose normal       Mouth/Throat:      Mouth: Mucous membranes are moist  Pharynx: Oropharynx is clear  Eyes:      Conjunctiva/sclera: Conjunctivae normal       Pupils: Pupils are equal, round, and reactive to light  Cardiovascular:      Rate and Rhythm: Normal rate and regular rhythm  Heart sounds: Normal heart sounds  Pulmonary:      Effort: Pulmonary effort is normal       Breath sounds: Normal breath sounds  Abdominal:      General: Bowel sounds are normal       Palpations: Abdomen is soft  Musculoskeletal:      Cervical back: Normal range of motion and neck supple  Skin:     General: Skin is warm and dry  Neurological:      Mental Status: He is alert and oriented to person, place, and time  Deep Tendon Reflexes: Reflexes are normal and symmetric  Psychiatric:         Behavior: Behavior normal          Thought Content:  Thought content normal          Judgment: Judgment normal        Sridevi Montes DO

## 2022-12-19 PROBLEM — F43.12 CHRONIC POST-TRAUMATIC STRESS DISORDER (PTSD): Status: ACTIVE | Noted: 2022-12-19

## 2023-03-25 DIAGNOSIS — I10 ESSENTIAL HYPERTENSION: ICD-10-CM

## 2023-03-25 NOTE — TELEPHONE ENCOUNTER
Medication Refill Request     Name HCTZ  Dose/Frequency 25mg, take 1 tab QD  Quantity 90  Verified pharmacy   [x]  Verified ordering Provider   [x]  Does patient have enough for the next 3 days?  Yes [x] No []

## 2023-03-27 RX ORDER — HYDROCHLOROTHIAZIDE 25 MG/1
25 TABLET ORAL DAILY
Qty: 90 TABLET | Refills: 0 | Status: SHIPPED | OUTPATIENT
Start: 2023-03-27

## 2023-04-06 ENCOUNTER — HOSPITAL ENCOUNTER (OUTPATIENT)
Dept: ULTRASOUND IMAGING | Facility: HOSPITAL | Age: 73
Discharge: HOME | End: 2023-04-06

## 2023-04-06 DIAGNOSIS — D69.6 THROMBOCYTOPENIA (HCC): ICD-10-CM

## 2023-04-06 DIAGNOSIS — R16.1 SPLENOMEGALY: ICD-10-CM

## 2023-04-07 ENCOUNTER — TELEPHONE (OUTPATIENT)
Dept: FAMILY MEDICINE CLINIC | Facility: CLINIC | Age: 73
End: 2023-04-07

## 2023-04-07 DIAGNOSIS — F41.9 ANXIETY: ICD-10-CM

## 2023-04-07 LAB
BASOPHILS # BLD AUTO: 31 CELLS/UL (ref 0–200)
BASOPHILS NFR BLD AUTO: 0.7 %
EOSINOPHIL # BLD AUTO: 110 CELLS/UL (ref 15–500)
EOSINOPHIL NFR BLD AUTO: 2.5 %
ERYTHROCYTE [DISTWIDTH] IN BLOOD BY AUTOMATED COUNT: 12.5 % (ref 11–15)
HCT VFR BLD AUTO: 49.6 % (ref 38.5–50)
HGB BLD-MCNC: 17.4 G/DL (ref 13.2–17.1)
LYMPHOCYTES # BLD AUTO: 1021 CELLS/UL (ref 850–3900)
LYMPHOCYTES NFR BLD AUTO: 23.2 %
MCH RBC QN AUTO: 33 PG (ref 27–33)
MCHC RBC AUTO-ENTMCNC: 35.1 G/DL (ref 32–36)
MCV RBC AUTO: 94.1 FL (ref 80–100)
MONOCYTES # BLD AUTO: 449 CELLS/UL (ref 200–950)
MONOCYTES NFR BLD AUTO: 10.2 %
NEUTROPHILS # BLD AUTO: 2790 CELLS/UL (ref 1500–7800)
NEUTROPHILS NFR BLD AUTO: 63.4 %
PLATELET # BLD AUTO: 91 THOUSAND/UL (ref 140–400)
PMV BLD REES-ECKER: 11 FL (ref 7.5–12.5)
RBC # BLD AUTO: 5.27 MILLION/UL (ref 4.2–5.8)
SERVICE CMNT-IMP: ABNORMAL
SL AMB PLATELET ESTIMATION: ABNORMAL
WBC # BLD AUTO: 4.4 THOUSAND/UL (ref 3.8–10.8)

## 2023-04-07 RX ORDER — ALPRAZOLAM 0.25 MG/1
0.25 TABLET ORAL 4 TIMES DAILY
Qty: 120 TABLET | Refills: 2 | Status: SHIPPED | OUTPATIENT
Start: 2023-04-07 | End: 2023-07-06

## 2023-04-07 NOTE — TELEPHONE ENCOUNTER
Patient called to say [facility] pharmacy sent him a message that they are unable to supply the Alprazolam and they will contact the providers office

## 2023-04-07 NOTE — TELEPHONE ENCOUNTER
Called [facility] pharmacy, confirmed they do have the Alprazolam in stock  Called patient back and let him know Rx was sent to [facility] pharmacy, he stated he understood and will  the Rx

## 2023-05-18 ENCOUNTER — OFFICE VISIT (OUTPATIENT)
Dept: HEMATOLOGY ONCOLOGY | Facility: CLINIC | Age: 73
End: 2023-05-18

## 2023-05-18 VITALS
SYSTOLIC BLOOD PRESSURE: 118 MMHG | OXYGEN SATURATION: 93 % | DIASTOLIC BLOOD PRESSURE: 62 MMHG | HEIGHT: 65 IN | BODY MASS INDEX: 37.32 KG/M2 | HEART RATE: 80 BPM | RESPIRATION RATE: 17 BRPM | WEIGHT: 224 LBS

## 2023-05-18 DIAGNOSIS — D69.6 THROMBOCYTOPENIA (HCC): Primary | ICD-10-CM

## 2023-05-18 NOTE — PROGRESS NOTES
Hematology/Oncology Outpatient Follow-up  Alvin Alaniz  74 y o  male 1950 [id number]    Date:  5/18/2023      Assessment and Plan:  1  Thrombocytopenia [facility])  75-year-old male presents for follow-up visit regarding history of chronic thrombocytopenia  Levels have been stable, most recently 91,000  He can continue monitoring with PCP  Follow-up as needed  HPI:  75-year-old male presents for follow-up regarding mild thrombocytopenia   This has been fluctuating between approximately 80,000 - 120,000 since June 2018   Likely patient has chronic ITP     Spleen was normal size 11 9 cm      JAK2 mutation negative    ROS: Review of Systems   Constitutional: Positive for fatigue  Negative for appetite change, chills, fever and unexpected weight change  Respiratory: Negative for cough and shortness of breath  Cardiovascular: Negative for chest pain, palpitations and leg swelling  Gastrointestinal: Negative for abdominal pain, constipation, diarrhea, nausea and vomiting  Genitourinary: Negative for difficulty urinating, dysuria and hematuria  Musculoskeletal: Negative for arthralgias  Skin: Negative  Neurological: Negative for dizziness, weakness, light-headedness, numbness and headaches  Hematological: Negative  Psychiatric/Behavioral: Negative          Past Medical History:   Diagnosis Date   • Cat-scratch disease    • Cellulitis    • Erysipelas     last assessed-10/14/2013   • Parathyroid adenoma    • Rosacea    • Thrombocytopenia ([facility] [address] )     last assessed-1/16/2017   • Tick bites     last assessed-10/14/2013       Past Surgical History:   Procedure Laterality Date   • APPENDECTOMY      1998   • LYMPHADENECTOMY     • PARATHYROIDECTOMY     • NJ OPEN TX TRIMALLEOLAR ANKLE FX W/O FIXJ PST LIP Right 5/17/2021    Procedure: OPEN REDUCTION W/ INTERNAL FIXATION (ORIF) ANKLE;  Surgeon: Kyler Escamilla MD;  Location:  MAIN OR;  Service: Orthopedics   • TOOTH EXTRACTION         Social History Socioeconomic History   • Marital status: /Civil Union     Spouse name: None   • Number of children: None   • Years of education: None   • Highest education level: None   Occupational History   • None   Tobacco Use   • Smoking status: Former   • Smokeless tobacco: Never   Vaping Use   • Vaping Use: Every day   • Start date: 2/12/2018   • Substances: Nicotine   Substance and Sexual Activity   • Alcohol use: No   • Drug use: Never   • Sexual activity: None   Other Topics Concern   • None   Social History Narrative    Advance directive in chart     Social Determinants of Health     Financial Resource Strain: Not on file   Food Insecurity: Not on file   Transportation Needs: Not on file   Physical Activity: Not on file   Stress: Not on file   Social Connections: Not on file   Intimate Partner Violence: Not on file   Housing Stability: Not on file       Family History   Problem Relation Age of Onset   • Aortic aneurysm Mother    • Hypertension Mother    • Heart attack Father         acute MI   • Breast cancer Paternal Grandmother    • Breast cancer Paternal Aunt    • Diabetes Paternal Uncle        Allergies   Allergen Reactions   • Morphine Hallucinations         Current Outpatient Medications:   •  ALPRAZolam (XANAX) 0 25 mg tablet, Take 1 tablet (0 25 mg total) by mouth 4 (four) times a day, Disp: 120 tablet, Rfl: 5  •  Ascorbic Acid (VITAMIN C) 100 MG CHEW, Chew 1 tablet daily, Disp: , Rfl:   •  atenolol (TENORMIN) 100 mg tablet, Take 1 tablet (100 mg total) by mouth 2 (two) times a day, Disp: 180 tablet, Rfl: 3  •  calcium citrate-vitamin d 500-500 MG-UNIT CHEW chewable tablet, Chew, Disp: , Rfl:   •  Cholecalciferol (VITAMIN D3) 1000 units CAPS, Take by mouth, Disp: , Rfl:   •  econazole nitrate 1 % cream, Apply topically daily, Disp: , Rfl:   •  escitalopram (LEXAPRO) 10 mg tablet, Take 1 tablet (10 mg total) by mouth daily, Disp: 90 tablet, Rfl: 3  •  famotidine (PEPCID) 40 MG tablet, Take 1 tablet (40 "mg total) by mouth daily, Disp: 90 tablet, Rfl: 3  •  hydrochlorothiazide (HYDRODIURIL) 25 mg tablet, Take 1 tablet (25 mg total) by mouth daily, Disp: 90 tablet, Rfl: 3  •  hydrocortisone 2 5 % cream, Apply topically, Disp: , Rfl:   •  ketoconazole (NIZORAL) 2 % cream, APPLY TO SKIN TWICE DAILY AS DIRECTED, Disp: , Rfl: 2  •  LORazepam (ATIVAN) 1 mg tablet, Take 1 tablet (1 mg total) by mouth 3 (three) times a day, Disp: 90 tablet, Rfl: 5  •  losartan (COZAAR) 100 MG tablet, Take 1 tablet (100 mg total) by mouth daily, Disp: 90 tablet, Rfl: 3  •  Magnesium 250 MG TABS, Take by mouth, Disp: , Rfl:   •  metroNIDAZOLE (METROGEL) 0 75 % gel, APPLY TO SKIN EVERY DAY, Disp: , Rfl:   •  metroNIDAZOLE (METROGEL) 1 % gel, Apply topically, Disp: , Rfl:   •  Multiple Vitamin (MULTIVITAMINS PO), Take 1 tablet by mouth daily, Disp: , Rfl:   •  potassium chloride (Klor-Con 10) 10 mEq tablet, Take 1 tablet (10 mEq total) by mouth daily, Disp: 90 tablet, Rfl: 3  •  cephalexin (KEFLEX) 500 mg capsule, Take 500 mg by mouth 2 (two) times a day (Patient not taking: Reported on 12/14/2022), Disp: , Rfl:   •  doxycycline (DORYX) 150 MG EC tablet, Take 150 mg by mouth daily (Patient not taking: Reported on 4/12/2023), Disp: , Rfl: 6  •  ILEVRO 0 3 % SUSP, INSTILL 1 DROP INTO RIGHT EYE EVERY DAY STARTING DAY OF SURGERY FOR 28 DAYS (Patient not taking: Reported on 12/14/2022), Disp: , Rfl: 1  •  ondansetron (ZOFRAN) 4 mg tablet, Take 1 tablet (4 mg total) by mouth every 8 (eight) hours as needed for nausea or vomiting (Patient not taking: Reported on 4/12/2023), Disp: 20 tablet, Rfl: 0      Physical Exam:  /62 (BP Location: Left arm, Patient Position: Sitting, Cuff Size: Adult)   Pulse 80   Resp 17   Ht 5' 5\" (1 651 m)   Wt 102 kg (224 lb)   SpO2 93%   BMI 37 28 kg/m²     Physical Exam  Vitals reviewed  Constitutional:       General: He is not in acute distress  Appearance: He is well-developed  He is not ill-appearing   " HENT:      Head: Normocephalic and atraumatic  Eyes:      General: No scleral icterus  Conjunctiva/sclera: Conjunctivae normal    Cardiovascular:      Rate and Rhythm: Normal rate and regular rhythm  Heart sounds: Normal heart sounds  No murmur heard  Pulmonary:      Effort: Pulmonary effort is normal  No respiratory distress  Breath sounds: Normal breath sounds  Abdominal:      Palpations: Abdomen is soft  Tenderness: There is no abdominal tenderness  Musculoskeletal:         General: No tenderness  Normal range of motion  Cervical back: Normal range of motion and neck supple  Right lower leg: No edema  Left lower leg: No edema  Lymphadenopathy:      Cervical: No cervical adenopathy  Skin:     General: Skin is warm and dry  Neurological:      Mental Status: He is alert and oriented to person, place, and time  Cranial Nerves: No cranial nerve deficit  Psychiatric:         Mood and Affect: Mood normal          Behavior: Behavior normal          Labs:  Lab Results   Component Value Date    WBC 4 4 04/06/2023    HGB 17 4 (H) 04/06/2023    HCT 49 6 04/06/2023    MCV 94 1 04/06/2023    PLT 91 (L) 04/06/2023        Patient voiced understanding and agreement in the above discussion  Aware to contact our office with questions/symptoms in the interim  This note has been generated by voice recognition software system  Therefore, there may be spelling, grammar, and or syntax errors  Please contact if questions arise

## 2023-06-30 LAB
25(OH)D3 SERPL-MCNC: 56 NG/ML (ref 30–100)
ALBUMIN SERPL-MCNC: 4.4 G/DL (ref 3.6–5.1)
ALBUMIN/GLOB SERPL: 1.9 (CALC) (ref 1–2.5)
ALP SERPL-CCNC: 56 U/L (ref 35–144)
ALT SERPL-CCNC: 26 U/L (ref 9–46)
AST SERPL-CCNC: 22 U/L (ref 10–35)
BILIRUB DIRECT SERPL-MCNC: 0.2 MG/DL
BILIRUB INDIRECT SERPL-MCNC: 1.5 MG/DL (CALC) (ref 0.2–1.2)
BILIRUB SERPL-MCNC: 1.7 MG/DL (ref 0.2–1.2)
BUN SERPL-MCNC: 25 MG/DL (ref 7–25)
BUN/CREAT SERPL: ABNORMAL (CALC) (ref 6–22)
CALCIUM SERPL-MCNC: 9.9 MG/DL (ref 8.6–10.3)
CHLORIDE SERPL-SCNC: 102 MMOL/L (ref 98–110)
CHOLEST SERPL-MCNC: 174 MG/DL
CHOLEST/HDLC SERPL: 4.6 (CALC)
CO2 SERPL-SCNC: 32 MMOL/L (ref 20–32)
CREAT SERPL-MCNC: 1 MG/DL (ref 0.7–1.28)
GFR/BSA.PRED SERPLBLD CYS-BASED-ARV: 78 ML/MIN/1.73M2
GLOBULIN SER CALC-MCNC: 2.3 G/DL (CALC) (ref 1.9–3.7)
GLUCOSE SERPL-MCNC: 108 MG/DL (ref 65–99)
HDLC SERPL-MCNC: 38 MG/DL
NONHDLC SERPL-MCNC: 136 MG/DL (CALC)
POTASSIUM SERPL-SCNC: 4.3 MMOL/L (ref 3.5–5.3)
PROT SERPL-MCNC: 6.7 G/DL (ref 6.1–8.1)
PSA SERPL-MCNC: 0.83 NG/ML
SODIUM SERPL-SCNC: 141 MMOL/L (ref 135–146)
TRIGL SERPL-MCNC: 468 MG/DL

## 2023-07-05 ENCOUNTER — TELEPHONE (OUTPATIENT)
Dept: FAMILY MEDICINE CLINIC | Facility: CLINIC | Age: 73
End: 2023-07-05

## 2023-07-05 DIAGNOSIS — E78.1 HYPERTRIGLYCERIDEMIA: Primary | ICD-10-CM

## 2023-07-05 NOTE — TELEPHONE ENCOUNTER
----- Message from Edd Hernandez DO sent at 7/5/2023 11:39 AM EDT -----  Triglycerides elevated, referred to [facility], thanks, referral printed.  ----- Message -----  From: Miranda Salazar Results In  Sent: 6/30/2023   7:15 PM EDT  To: Edd Hernandez DO

## 2023-08-10 ENCOUNTER — RA CDI HCC (OUTPATIENT)
Dept: OTHER | Facility: HOSPITAL | Age: 73
End: 2023-08-10

## 2023-08-10 NOTE — PROGRESS NOTES
[address] [facility] coding opportunities       Chart reviewed, no opportunity found: [address]        Patients Insurance     Medicare Insurance: [employer]age

## 2023-08-16 ENCOUNTER — TELEPHONE (OUTPATIENT)
Dept: OTHER | Facility: OTHER | Age: 73
End: 2023-08-16

## 2023-08-16 ENCOUNTER — OFFICE VISIT (OUTPATIENT)
Dept: FAMILY MEDICINE CLINIC | Facility: CLINIC | Age: 73
End: 2023-08-16
Payer: COMMERCIAL

## 2023-08-16 VITALS
DIASTOLIC BLOOD PRESSURE: 78 MMHG | TEMPERATURE: 97.7 F | HEART RATE: 85 BPM | OXYGEN SATURATION: 97 % | SYSTOLIC BLOOD PRESSURE: 126 MMHG | BODY MASS INDEX: 38.29 KG/M2 | WEIGHT: 229.8 LBS | HEIGHT: 65 IN

## 2023-08-16 DIAGNOSIS — D69.6 THROMBOCYTOPENIA (HCC): ICD-10-CM

## 2023-08-16 DIAGNOSIS — Z00.00 MEDICARE ANNUAL WELLNESS VISIT, SUBSEQUENT: Primary | ICD-10-CM

## 2023-08-16 DIAGNOSIS — F33.0 MILD EPISODE OF RECURRENT MAJOR DEPRESSIVE DISORDER (HCC): ICD-10-CM

## 2023-08-16 DIAGNOSIS — I10 ESSENTIAL HYPERTENSION: ICD-10-CM

## 2023-08-16 DIAGNOSIS — F41.9 ANXIETY: ICD-10-CM

## 2023-08-16 DIAGNOSIS — F43.12 CHRONIC POST-TRAUMATIC STRESS DISORDER (PTSD): ICD-10-CM

## 2023-08-16 DIAGNOSIS — E66.01 OBESITY, MORBID (HCC): ICD-10-CM

## 2023-08-16 PROCEDURE — G0444 DEPRESSION SCREEN ANNUAL: HCPCS | Performed by: FAMILY MEDICINE

## 2023-08-16 PROCEDURE — G0439 PPPS, SUBSEQ VISIT: HCPCS | Performed by: FAMILY MEDICINE

## 2023-08-16 PROCEDURE — 99214 OFFICE O/P EST MOD 30 MIN: CPT | Performed by: FAMILY MEDICINE

## 2023-08-16 RX ORDER — LORAZEPAM 1 MG/1
1 TABLET ORAL 3 TIMES DAILY
Qty: 90 TABLET | Refills: 5 | Status: SHIPPED | OUTPATIENT
Start: 2023-08-16 | End: 2023-08-17 | Stop reason: SDUPTHER

## 2023-08-16 RX ORDER — ALPRAZOLAM 0.25 MG/1
0.25 TABLET ORAL 4 TIMES DAILY
Qty: 120 TABLET | Refills: 5 | Status: SHIPPED | OUTPATIENT
Start: 2023-08-16 | End: 2023-08-17 | Stop reason: SDUPTHER

## 2023-08-16 NOTE — TELEPHONE ENCOUNTER
Patient called today to request [facility] [place], [place].

## 2023-08-16 NOTE — PATIENT INSTRUCTIONS
Medicare Preventive Visit Patient Instructions  Thank you for completing your Welcome to Medicare Visit or Medicare Annual Wellness Visit today. Your next wellness visit will be due in one year (8/16/2024). The screening/preventive services that you may require over the next 5-10 years are detailed below. Some tests may not apply to you based off risk factors and/or age. Screening tests ordered at today's visit but not completed yet may show as past due. Also, please note that scanned in results may not display below. Preventive Screenings:  Service Recommendations Previous Testing/Comments   Colorectal Cancer Screening  · Colonoscopy    · Fecal Occult Blood Test (FOBT)/Fecal Immunochemical Test (FIT)  · Fecal DNA/Cologuard Test  · Flexible Sigmoidoscopy Age: 43-73 years old   Colonoscopy: every 10 years (May be performed more frequently if at higher risk)  OR  FOBT/FIT: every 1 year  OR  Cologuard: every 3 years  OR  Sigmoidoscopy: every 5 years  Screening may be recommended earlier than age 39 if at higher risk for colorectal cancer. Also, an individualized decision between you and your healthcare provider will decide whether screening between the ages of 77-80 would be appropriate.  Colonoscopy: 07/20/2023  FOBT/FIT: Not on file  Cologuard: 07/20/2023  Sigmoidoscopy: Not on file          Prostate Cancer Screening Individualized decision between patient and health care provider in men between ages of 53-66   Medicare will cover every 12 months beginning on the day after your 50th birthday PSA: 0.83 ng/mL           Hepatitis C Screening Once for adults born between 1945 and 1965  More frequently in patients at high risk for Hepatitis C Hep C Antibody: 04/22/2020        Diabetes Screening 1-2 times per year if you're at risk for diabetes or have pre-diabetes Fasting glucose: No results in last 5 years (No results in last 5 years)  A1C: No results in last 5 years (No results in last 5 years)      Cholesterol Screening Once every 5 years if you don't have a lipid disorder. May order more often based on risk factors. Lipid panel: 06/30/2023         Other Preventive Screenings Covered by Medicare:  1. Abdominal Aortic Aneurysm (AAA) Screening: covered once if your at risk. You're considered to be at risk if you have a family history of AAA or a male between the age of 70-76 who smoking at least 100 cigarettes in your lifetime. 2. Lung Cancer Screening: covers low dose CT scan once per year if you meet all of the following conditions: (1) Age 48-67; (2) No signs or symptoms of lung cancer; (3) Current smoker or have quit smoking within the last 15 years; (4) You have a tobacco smoking history of at least 20 pack years (packs per day x number of years you smoked); (5) You get a written order from a healthcare provider. 3. Glaucoma Screening: covered annually if you're considered high risk: (1) You have diabetes OR (2) Family history of glaucoma OR (3)  aged 48 and older OR (3)  American aged 72 and older  3. Osteoporosis Screening: covered every 2 years if you meet one of the following conditions: (1) Have a vertebral abnormality; (2) On glucocorticoid therapy for more than 3 months; (3) Have primary hyperparathyroidism; (4) On osteoporosis medications and need to assess response to drug therapy. 5. HIV Screening: covered annually if you're between the age of 14-79. Also covered annually if you are younger than 13 and older than 72 with risk factors for HIV infection. For pregnant patients, it is covered up to 3 times per pregnancy.     Immunizations:  Immunization Recommendations   Influenza Vaccine Annual influenza vaccination during flu season is recommended for all persons aged >= 6 months who do not have contraindications   Pneumococcal Vaccine   * Pneumococcal conjugate vaccine = PCV13 (Prevnar 13), PCV15 (Vaxneuvance), PCV20 (Prevnar 20)  * Pneumococcal polysaccharide vaccine = PPSV23 (Pneumovax) Adults 2364 years old: 1-3 doses may be recommended based on certain risk factors  Adults 72 years old: 1-2 doses may be recommended based off what pneumonia vaccine you previously received   Hepatitis B Vaccine 3 dose series if at intermediate or high risk (ex: diabetes, end stage renal disease, liver disease)   Tetanus (Td) Vaccine - COST NOT COVERED BY MEDICARE PART B Following completion of primary series, a booster dose should be given every 10 years to maintain immunity against tetanus. Td may also be given as tetanus wound prophylaxis. Tdap Vaccine - COST NOT COVERED BY MEDICARE PART B Recommended at least once for all adults. For pregnant patients, recommended with each pregnancy. Shingles Vaccine (Shingrix) - COST NOT COVERED BY MEDICARE PART B  2 shot series recommended in those aged 48 and above     Health Maintenance Due:      Topic Date Due   • Colorectal Cancer Screening  07/20/2026   • Hepatitis C Screening  Completed     Immunizations Due:      Topic Date Due   • Pneumococcal Vaccine: 65+ Years (2 - PCV) 09/30/2019   • COVID-19 Vaccine (5 - Pfizer series) 06/25/2022   • Influenza Vaccine (1) 09/01/2023     Advance Directives   What are advance directives? Advance directives are legal documents that state your wishes and plans for medical care. These plans are made ahead of time in case you lose your ability to make decisions for yourself. Advance directives can apply to any medical decision, such as the treatments you want, and if you want to donate organs. What are the types of advance directives? There are many types of advance directives, and each state has rules about how to use them. You may choose a combination of any of the following:  · Living will: This is a written record of the treatment you want. You can also choose which treatments you do not want, which to limit, and which to stop at a certain time. This includes surgery, medicine, IV fluid, and tube feedings. · Durable power of  for [facility]): This is a written record that states who you want to make healthcare choices for you when you are unable to make them for yourself. This person, called a proxy, is usually a family member or a friend. You may choose more than 1 proxy. · Do not resuscitate (DNR) order:  A DNR order is used in case your heart stops beating or you stop breathing. It is a request not to have certain forms of treatment, such as CPR. A DNR order may be included in other types of advance directives. · Medical directive: This covers the care that you want if you are in a coma, near death, or unable to make decisions for yourself. You can list the treatments you want for each condition. Treatment may include pain medicine, surgery, blood transfusions, dialysis, IV or tube feedings, and a ventilator (breathing machine). · Values history: This document has questions about your views, beliefs, and how you feel and think about life. This information can help others choose the care that you would choose. Why are advance directives important? An advance directive helps you control your care. Although spoken wishes may be used, it is better to have your wishes written down. Spoken wishes can be misunderstood, or not followed. Treatments may be given even if you do not want them. An advance directive may make it easier for your family to make difficult choices about your care. Weight Management   Why it is important to manage your weight:  Being overweight increases your risk of health conditions such as heart disease, high blood pressure, type 2 diabetes, and certain types of cancer. It can also increase your risk for osteoarthritis, sleep apnea, and other respiratory problems. Aim for a slow, steady weight loss. Even a small amount of weight loss can lower your risk of health problems.   How to lose weight safely:  A safe and healthy way to lose weight is to eat fewer calories and get regular exercise. You can lose up about 1 pound a week by decreasing the number of calories you eat by 500 calories each day. Healthy meal plan for weight management:  A healthy meal plan includes a variety of foods, contains fewer calories, and helps you stay healthy. A healthy meal plan includes the following:  · Eat whole-grain foods more often. A healthy meal plan should contain fiber. Fiber is the part of grains, fruits, and vegetables that is not broken down by your body. Whole-grain foods are healthy and provide extra fiber in your diet. Some examples of whole-grain foods are whole-wheat breads and pastas, oatmeal, brown rice, and bulgur. · Eat a variety of vegetables every day. Include dark, leafy greens such as spinach, kale, gamaliel greens, and mustard greens. Eat yellow and orange vegetables such as carrots, sweet potatoes, and winter squash. · Eat a variety of fruits every day. Choose fresh or canned fruit (canned in its own juice or light syrup) instead of juice. Fruit juice has very little or no fiber. · Eat low-fat dairy foods. Drink fat-free (skim) milk or 1% milk. Eat fat-free yogurt and low-fat cottage cheese. Try low-fat cheeses such as mozzarella and other reduced-fat cheeses. · Choose meat and other protein foods that are low in fat. Choose beans or other legumes such as split peas or lentils. Choose fish, skinless poultry (chicken or turkey), or lean cuts of red meat (beef or pork). Before you cook meat or poultry, cut off any visible fat. · Use less fat and oil. Try baking foods instead of frying them. Add less fat, such as margarine, sour cream, regular salad dressing and mayonnaise to foods. Eat fewer high-fat foods. Some examples of high-fat foods include french fries, doughnuts, ice cream, and cakes. · Eat fewer sweets. Limit foods and drinks that are high in sugar. This includes candy, cookies, regular soda, and sweetened drinks.   Exercise:  Exercise at least 30 minutes per day on most days of the week. Some examples of exercise include walking, biking, dancing, and swimming. You can also fit in more physical activity by taking the stairs instead of the elevator or parking farther away from stores. Ask your healthcare provider about the best exercise plan for you. © Copyright [employer] 2018 Information is for End User's use only and may not be sold, redistributed or otherwise used for commercial purposes.  All illustrations and images included in CareNotes® are the copyrighted property of [employer] or  [employer]

## 2023-08-16 NOTE — PROGRESS NOTES
Assessment and Plan:     Problem List Items Addressed This Visit     Essential hypertension    Anxiety    Relevant Medications    ALPRAZolam (XANAX) 0.25 mg tablet    LORazepam (ATIVAN) 1 mg tablet    Recurrent major depressive disorder (HCC)    Relevant Medications    ALPRAZolam (XANAX) 0.25 mg tablet    LORazepam (ATIVAN) 1 mg tablet    Thrombocytopenia (HCC)    Obesity, morbid (HCC)    Chronic post-traumatic stress disorder (PTSD)    Relevant Medications    ALPRAZolam (XANAX) 0.25 mg tablet    LORazepam (ATIVAN) 1 mg tablet   Other Visit Diagnoses     Medicare annual wellness visit, subsequent    -  Primary           Preventive health issues were discussed with patient, and age appropriate screening tests were ordered as noted in patient's After Visit Summary. Personalized health advice and appropriate referrals for health education or preventive services given if needed, as noted in patient's After Visit Summary. History of Present Illness:     Patient presents for a Medicare Wellness Visit    Medicare PE. Review labs, refill med's. Patient Care Team:  Thalia Schwartz DO as PCP - [address] [place] [address]     Review of Systems:     Review of Systems   Constitutional: Negative. HENT: Negative. Eyes: Negative. Respiratory: Negative. Cardiovascular: Negative. Gastrointestinal: Negative. Genitourinary: Negative. Musculoskeletal: Negative. Skin: Negative. Neurological: Negative. Psychiatric/Behavioral: Negative.          Problem List:     Patient Active Problem List   Diagnosis   • Essential hypertension   • Anxiety   • Recurrent major depressive disorder (HCC)   • Thrombocytopenia (HCC)   • Erythrocytosis   • Splenomegaly   • Hyperlipidemia   • Obesity, morbid (HCC)   • Closed trimalleolar fracture of right ankle   • Hyperparathyroidism ([address])   • Mild episode of recurrent major depressive disorder (HCC)   • Chronic post-traumatic stress disorder (PTSD)      Past Medical and Surgical History:     Past Medical History:   Diagnosis Date   • Cat-scratch disease    • Cellulitis    • Erysipelas     last assessed-10/14/2013   • Parathyroid adenoma    • Rosacea    • Thrombocytopenia ([address])     last assessed-1/16/2017   • Tick bites     last assessed-10/14/2013     Past Surgical History:   Procedure Laterality Date   • APPENDECTOMY      1998   • LYMPHADENECTOMY     • PARATHYROIDECTOMY     • MI OPEN TX TRIMALLEOLAR ANKLE FX W/O FIXJ PST LIP Right 5/17/2021    Procedure: OPEN REDUCTION W/ INTERNAL FIXATION (ORIF) ANKLE;  Surgeon: Ansley Chen MD;  Location:  MAIN OR;  Service: Orthopedics   • TOOTH EXTRACTION        Family History:     Family History   Problem Relation Age of Onset   • Aortic aneurysm Mother    • Hypertension Mother    • Heart attack Father         acute MI   • Breast cancer Paternal Grandmother    • Breast cancer Paternal Aunt    • Diabetes Paternal Uncle       Social History:     Social History     Socioeconomic History   • Marital status: /Civil Union     Spouse name: None   • Number of children: None   • Years of education: None   • Highest education level: None   Occupational History   • None   Tobacco Use   • Smoking status: Former   • Smokeless tobacco: Never   Vaping Use   • Vaping Use: Every day   • Start date: 2/12/2018   • Substances: Nicotine   Substance and Sexual Activity   • Alcohol use: No   • Drug use: Never   • Sexual activity: None   Other Topics Concern   • None   Social History Narrative    Advance directive in chart     Social Determinants of Health     Financial Resource Strain: Low Risk  (8/16/2023)    Overall Financial Resource Strain (CARDIA)    • Difficulty of Paying Living Expenses: Not hard at all   Food Insecurity: Not on file   Transportation Needs: No Transportation Needs (8/16/2023)    PRAPARE - Transportation    • Lack of Transportation (Medical): No    • Lack of Transportation (Non-Medical):  No   Physical Activity: Not on file Stress: Not on file   Social Connections: Not on file   Intimate Partner Violence: Not on file   Housing Stability: Not on file      Medications and Allergies:     Current Outpatient Medications   Medication Sig Dispense Refill   • ALPRAZolam (XANAX) 0.25 mg tablet Take 1 tablet (0.25 mg total) by mouth 4 (four) times a day 120 tablet 5   • Ascorbic Acid (VITAMIN C) 100 MG CHEW Chew 1 tablet daily     • atenolol (TENORMIN) 100 mg tablet Take 1 tablet (100 mg total) by mouth 2 (two) times a day 180 tablet 3   • calcium citrate-vitamin d 500-500 MG-UNIT CHEW chewable tablet Chew     • Cholecalciferol (VITAMIN D3) 1000 units CAPS Take by mouth     • econazole nitrate 1 % cream Apply topically daily     • escitalopram (LEXAPRO) 10 mg tablet Take 1 tablet (10 mg total) by mouth daily 90 tablet 3   • famotidine (PEPCID) 40 MG tablet Take 1 tablet (40 mg total) by mouth daily 90 tablet 3   • hydrochlorothiazide (HYDRODIURIL) 25 mg tablet Take 1 tablet (25 mg total) by mouth daily 90 tablet 3   • hydrocortisone 2.5 % cream Apply topically     • ketoconazole (NIZORAL) 2 % cream APPLY TO SKIN TWICE DAILY AS DIRECTED  2   • LORazepam (ATIVAN) 1 mg tablet Take 1 tablet (1 mg total) by mouth 3 (three) times a day 90 tablet 5   • losartan (COZAAR) 100 MG tablet Take 1 tablet (100 mg total) by mouth daily 90 tablet 3   • Magnesium 250 MG TABS Take by mouth     • metroNIDAZOLE (METROGEL) 0.75 % gel APPLY TO SKIN EVERY DAY     • metroNIDAZOLE (METROGEL) 1 % gel Apply topically     • Multiple Vitamin (MULTIVITAMINS PO) Take 1 tablet by mouth daily     • potassium chloride (Klor-Con 10) 10 mEq tablet Take 1 tablet (10 mEq total) by mouth daily 90 tablet 3   • cephalexin (KEFLEX) 500 mg capsule Take 500 mg by mouth 2 (two) times a day (Patient not taking: Reported on 8/16/2023)     • doxycycline (DORYX) 150 MG EC tablet Take 150 mg by mouth daily (Patient not taking: Reported on 4/12/2023)  6   • ILEVRO 0.3 % SUSP   1   • ondansetron (ZOFRAN) 4 mg tablet Take 1 tablet (4 mg total) by mouth every 8 (eight) hours as needed for nausea or vomiting (Patient not taking: Reported on 8/16/2023) 20 tablet 0     No current facility-administered medications for this visit. Allergies   Allergen Reactions   • Morphine Hallucinations      Immunizations:     Immunization History   Administered Date(s) Administered   • COVID-19 PFIZER VACCINE 0.3 ML IM 04/30/2021, 05/21/2021, 11/27/2021   • COVID-19 Pfizer vac (Ronald-sucrose, gray cap) 12 yr+ IM 04/30/2022   • H1N1, All Formulations 01/09/2010   • INFLUENZA 10/22/2007, 10/17/2008, 10/16/2009, 10/22/2010, 09/16/2016, 09/30/2018, 09/05/2020, 10/10/2021   • Influenza Quadrivalent, 6-35 Months IM 09/16/2016   • Influenza Split High Dose Preservative Free IM 10/26/2015, 10/09/2016, 09/30/2018, 09/24/2019   • Pneumococcal Polysaccharide PPV23 10/22/2007, 12/23/2013, 09/30/2018   • TD (adult) Preservative Free 08/14/2018   • Td (adult), adsorbed 06/09/2008, 08/14/2018   • Zoster 12/01/2009, 08/03/2017      Health Maintenance:         Topic Date Due   • Colorectal Cancer Screening  07/20/2026   • Hepatitis C Screening  Completed         Topic Date Due   • Pneumococcal Vaccine: 65+ Years (2 - PCV) 09/30/2019   • COVID-19 Vaccine (5 - Pfizer series) 06/25/2022   • Influenza Vaccine (1) 09/01/2023      Medicare Screening Tests and Risk Assessments:     Greg Mckinney is here for his Subsequent Wellness visit. Health Risk Assessment:   Patient rates overall health as very good. Patient feels that their physical health rating is slightly better. Patient is very satisfied with their life. Eyesight was rated as same. Hearing was rated as same. Patient feels that their emotional and mental health rating is slightly better. Patients states they are never, rarely angry. Patient states they are often unusually tired/fatigued. Pain experienced in the last 7 days has been none.  Patient states that he has experienced no weight loss or gain in last 6 months. Depression Screening:   PHQ-9 Score: 0      Fall Risk Screening: In the past year, patient has experienced: no history of falling in past year      Home Safety:  Patient does not have trouble with stairs inside or outside of their home. Patient has working smoke alarms and has working carbon monoxide detector. Home safety hazards include: none. Nutrition:   Current diet is Regular and Frequent junk food. Medications:   Patient is currently taking over-the-counter supplements. OTC medications include: see medication list. Patient is able to manage medications. Activities of Daily Living (ADLs)/Instrumental Activities of Daily Living (IADLs):   Walk and transfer into and out of bed and chair?: Yes  Dress and groom yourself?: Yes    Bathe or shower yourself?: Yes    Feed yourself? Yes  Do your laundry/housekeeping?: Yes  Manage your money, pay your bills and track your expenses?: Yes  Make your own meals?: Yes    Do your own shopping?: Yes    Previous Hospitalizations:   Any hospitalizations or ED visits within the last 12 months?: No      Advance Care Planning:   Living will: Yes    Durable POA for healthcare:  Yes    Advanced directive: Yes      Cognitive Screening:   Provider or family/friend/caregiver concerned regarding cognition?: No    PREVENTIVE SCREENINGS      Cardiovascular Screening:    General: Screening Not Indicated and History Lipid Disorder      Diabetes Screening:     General: Screening Current      Colorectal Cancer Screening:     General: Screening Not Indicated      Prostate Cancer Screening:    General: Screening Not Indicated      Osteoporosis Screening:    General: Screening Not Indicated      Abdominal Aortic Aneurysm (AAA) Screening:    Risk factors include: age between 70-77 yo and tobacco use        Lung Cancer Screening:     General: Screening Not Indicated      Hepatitis C Screening:    General: Screening Current    Screening, Brief Intervention, and Referral to Treatment (SBIRT)    Screening      AUDIT-C Screenin) How often did you have a drink containing alcohol in the past year? never  2) How many drinks did you have on a typical day when you were drinking in the past year? 0  3) How often did you have 6 or more drinks on one occasion in the past year? never    AUDIT-C Score: 0  Interpretation: Score 0-3 (male): Negative screen for alcohol misuse    Single Item Drug Screening:  How often have you used an illegal drug (including marijuana) or a prescription medication for non-medical reasons in the past year? never    Single Item Drug Screen Score: 0  Interpretation: Negative screen for possible drug use disorder    No results found. Physical Exam:     /78 (BP Location: Left arm, Patient Position: Sitting, Cuff Size: Standard)   Pulse 85   Temp 97.7 °F (36.5 °C) (Temporal)   Ht 5' 5" (1.651 m)   Wt 104 kg (229 lb 12.8 oz)   SpO2 97%   BMI 38.24 kg/m²     Physical Exam  Vitals and nursing note reviewed. Constitutional:       General: He is not in acute distress. Appearance: He is well-developed. HENT:      Head: Normocephalic and atraumatic. Right Ear: External ear normal.      Left Ear: External ear normal.      Nose: Nose normal.      Mouth/Throat:      Mouth: Mucous membranes are moist.      Pharynx: Oropharynx is clear. Eyes:      Conjunctiva/sclera: Conjunctivae normal.      Pupils: Pupils are equal, round, and reactive to light. Cardiovascular:      Rate and Rhythm: Normal rate and regular rhythm. Heart sounds: No murmur heard. Pulmonary:      Effort: Pulmonary effort is normal. No respiratory distress. Breath sounds: Normal breath sounds. Abdominal:      General: Abdomen is flat. Bowel sounds are normal.      Palpations: Abdomen is soft. Tenderness: There is no abdominal tenderness. Musculoskeletal:         General: No swelling. Cervical back: Neck supple.    Skin:     General: Skin is warm and dry. Capillary Refill: Capillary refill takes less than 2 seconds. Neurological:      Mental Status: He is alert and oriented to person, place, and time. Psychiatric:         Mood and Affect: Mood normal.         Behavior: Behavior normal.         Thought Content:  Thought content normal.         Judgment: Judgment normal.          Blair Conte,

## 2023-08-16 NOTE — PROGRESS NOTES
Name: Chris Herrera : 1950      MRN: [id number]  Encounter Provider: Florencia Clayton DO  Encounter Date: 2023   Encounter department: [address] [place] [address]     Chief Complaint   Patient presents with   • Follow-up     Review blood work/cologuard and ultrasound of abdomen    • Medicare Wellness Visit   • Hypertension     BMI Counseling: Body mass index is 38.24 kg/m². The BMI is above normal. Nutrition recommendations include reducing portion sizes, consuming healthier snacks, moderation in carbohydrate intake and increasing intake of lean protein. PHQ-2/9 Depression Screening    Little interest or pleasure in doing things: 0 - not at all  Feeling down, depressed, or hopeless: 0 - not at all  Trouble falling or staying asleep, or sleeping too much: 0 - not at all  Feeling tired or having little energy: 0 - not at all  Poor appetite or overeatin - not at all  Feeling bad about yourself - or that you are a failure or have let yourself or your family down: 0 - not at all  Trouble concentrating on things, such as reading the newspaper or watching television: 0 - not at all  Moving or speaking so slowly that other people could have noticed. Or the opposite - being so fidgety or restless that you have been moving around a lot more than usual: 0 - not at all  Thoughts that you would be better off dead, or of hurting yourself in some way: 0 - not at all  PHQ-9 Score: 0   PHQ-9 Interpretation: No or Minimal depression          1. Medicare annual wellness visit, subsequent    2. Essential hypertension    3. Thrombocytopenia ([address])    4. Mild episode of recurrent major depressive disorder ([address])    5. Chronic post-traumatic stress disorder (PTSD)    6. Anxiety  -     ALPRAZolam (XANAX) 0.25 mg tablet; Take 1 tablet (0.25 mg total) by mouth 4 (four) times a day  -     LORazepam (ATIVAN) 1 mg tablet; Take 1 tablet (1 mg total) by mouth 3 (three) times a day    7.  Obesity, morbid ([address])           Subjective     Visit start 3:15. Review labs, refill Med's[facility] I have spent a total time of 30 minutes on 08/16/23 in caring for this patient including Diagnostic results, Risks and benefits of tx options, Instructions for management, Patient and family education, Risk factor reductions and Impressions. Review of Systems   Constitutional: Negative. HENT: Negative. Eyes: Negative. Respiratory: Negative. Cardiovascular: Negative. Gastrointestinal: Negative. Genitourinary: Negative. Musculoskeletal: Negative. Skin: Negative. Neurological: Negative. Psychiatric/Behavioral: Negative.         Past Medical History:   Diagnosis Date   • Cat-scratch disease    • Cellulitis    • Erysipelas     last assessed-10/14/2013   • Parathyroid adenoma    • Rosacea    • Thrombocytopenia ([address])     last assessed-1/16/2017   • Tick bites     last assessed-10/14/2013     Past Surgical History:   Procedure Laterality Date   • APPENDECTOMY      1998   • LYMPHADENECTOMY     • PARATHYROIDECTOMY     • MD OPEN TX TRIMALLEOLAR ANKLE FX W/O FIXJ PST LIP Right 5/17/2021    Procedure: OPEN REDUCTION W/ INTERNAL FIXATION (ORIF) ANKLE;  Surgeon: Isaac Vo MD;  Location:  MAIN OR;  Service: Orthopedics   • TOOTH EXTRACTION       Family History   Problem Relation Age of Onset   • Aortic aneurysm Mother    • Hypertension Mother    • Heart attack Father         acute MI   • Breast cancer Paternal Grandmother    • Breast cancer Paternal Aunt    • Diabetes Paternal Uncle      Social History     Socioeconomic History   • Marital status: /Civil Union     Spouse name: None   • Number of children: None   • Years of education: None   • Highest education level: None   Occupational History   • None   Tobacco Use   • Smoking status: Former   • Smokeless tobacco: Never   Vaping Use   • Vaping Use: Every day   • Start date: 2/12/2018   • Substances: Nicotine   Substance and Sexual Activity   • Alcohol use: No   • Drug use: Never   • Sexual activity: None   Other Topics Concern   • None   Social History Narrative    Advance directive in chart     Social Determinants of Health     Financial Resource Strain: Low Risk  (8/16/2023)    Overall Financial Resource Strain (CARDIA)    • Difficulty of Paying Living Expenses: Not hard at all   Food Insecurity: Not on file   Transportation Needs: No Transportation Needs (8/16/2023)    PRAPARE - Transportation    • Lack of Transportation (Medical): No    • Lack of Transportation (Non-Medical):  No   Physical Activity: Not on file   Stress: Not on file   Social Connections: Not on file   Intimate Partner Violence: Not on file   Housing Stability: Not on file     Current Outpatient Medications on File Prior to Visit   Medication Sig   • Ascorbic Acid (VITAMIN C) 100 MG CHEW Chew 1 tablet daily   • atenolol (TENORMIN) 100 mg tablet Take 1 tablet (100 mg total) by mouth 2 (two) times a day   • calcium citrate-vitamin d 500-500 MG-UNIT CHEW chewable tablet Chew   • Cholecalciferol (VITAMIN D3) 1000 units CAPS Take by mouth   • econazole nitrate 1 % cream Apply topically daily   • escitalopram (LEXAPRO) 10 mg tablet Take 1 tablet (10 mg total) by mouth daily   • famotidine (PEPCID) 40 MG tablet Take 1 tablet (40 mg total) by mouth daily   • hydrochlorothiazide (HYDRODIURIL) 25 mg tablet Take 1 tablet (25 mg total) by mouth daily   • hydrocortisone 2.5 % cream Apply topically   • ketoconazole (NIZORAL) 2 % cream APPLY TO SKIN TWICE DAILY AS DIRECTED   • losartan (COZAAR) 100 MG tablet Take 1 tablet (100 mg total) by mouth daily   • Magnesium 250 MG TABS Take by mouth   • metroNIDAZOLE (METROGEL) 0.75 % gel APPLY TO SKIN EVERY DAY   • metroNIDAZOLE (METROGEL) 1 % gel Apply topically   • Multiple Vitamin (MULTIVITAMINS PO) Take 1 tablet by mouth daily   • potassium chloride (Klor-Con 10) 10 mEq tablet Take 1 tablet (10 mEq total) by mouth daily   • [DISCONTINUED] ALPRAZolam (XANAX) 0.25 mg tablet Take 1 tablet (0.25 mg total) by mouth 4 (four) times a day   • [DISCONTINUED] LORazepam (ATIVAN) 1 mg tablet Take 1 tablet (1 mg total) by mouth 3 (three) times a day   • cephalexin (KEFLEX) 500 mg capsule Take 500 mg by mouth 2 (two) times a day (Patient not taking: Reported on 8/16/2023)   • doxycycline (DORYX) 150 MG EC tablet Take 150 mg by mouth daily (Patient not taking: Reported on 4/12/2023)   • ILEVRO 0.3 % SUSP    • ondansetron (ZOFRAN) 4 mg tablet Take 1 tablet (4 mg total) by mouth every 8 (eight) hours as needed for nausea or vomiting (Patient not taking: Reported on 8/16/2023)     Allergies   Allergen Reactions   • Morphine Hallucinations     Immunization History   Administered Date(s) Administered   • COVID-19 PFIZER VACCINE 0.3 ML IM 04/30/2021, 05/21/2021, 11/27/2021   • COVID-19 Pfizer vac (Ronald-sucrose, gray cap) 12 yr+ IM 04/30/2022   • H1N1, All Formulations 01/09/2010   • INFLUENZA 10/22/2007, 10/17/2008, 10/16/2009, 10/22/2010, 09/16/2016, 09/30/2018, 09/05/2020, 10/10/2021   • Influenza Quadrivalent, 6-35 Months IM 09/16/2016   • Influenza Split High Dose Preservative Free IM 10/26/2015, 10/09/2016, 09/30/2018, 09/24/2019   • Pneumococcal Polysaccharide PPV23 10/22/2007, 12/23/2013, 09/30/2018   • TD (adult) Preservative Free 08/14/2018   • Td (adult), adsorbed 06/09/2008, 08/14/2018   • Zoster 12/01/2009, 08/03/2017       Objective     /78 (BP Location: Left arm, Patient Position: Sitting, Cuff Size: Standard)   Pulse 85   Temp 97.7 °F (36.5 °C) (Temporal)   Ht 5' 5" (1.651 m)   Wt 104 kg (229 lb 12.8 oz)   SpO2 97%   BMI 38.24 kg/m²     Physical Exam  Constitutional:       Appearance: He is well-developed. HENT:      Head: Normocephalic and atraumatic.       Right Ear: Tympanic membrane, ear canal and external ear normal.      Left Ear: Tympanic membrane, ear canal and external ear normal.      Nose: Nose normal. Mouth/Throat:      Mouth: Mucous membranes are moist.      Pharynx: Oropharynx is clear. Eyes:      Conjunctiva/sclera: Conjunctivae normal.      Pupils: Pupils are equal, round, and reactive to light. Cardiovascular:      Rate and Rhythm: Normal rate and regular rhythm. Heart sounds: Normal heart sounds. Pulmonary:      Effort: Pulmonary effort is normal.      Breath sounds: Normal breath sounds. Abdominal:      General: Abdomen is flat. Bowel sounds are normal.      Palpations: Abdomen is soft. Musculoskeletal:      Cervical back: Normal range of motion and neck supple. Skin:     General: Skin is warm and dry. Neurological:      Mental Status: He is alert and oriented to person, place, and time. Deep Tendon Reflexes: Reflexes are normal and symmetric. Psychiatric:         Mood and Affect: Mood normal.         Behavior: Behavior normal.         Thought Content:  Thought content normal.         Judgment: Judgment normal.       Za Shore,

## 2023-08-17 DIAGNOSIS — F41.9 ANXIETY: ICD-10-CM

## 2023-08-18 RX ORDER — ALPRAZOLAM 0.25 MG/1
0.25 TABLET ORAL 4 TIMES DAILY
Qty: 120 TABLET | Refills: 5 | Status: SHIPPED | OUTPATIENT
Start: 2023-08-18 | End: 2023-11-16

## 2023-08-18 RX ORDER — LORAZEPAM 1 MG/1
1 TABLET ORAL 3 TIMES DAILY
Qty: 90 TABLET | Refills: 5 | Status: SHIPPED | OUTPATIENT
Start: 2023-08-18 | End: 2024-02-14

## 2023-12-13 ENCOUNTER — OFFICE VISIT (OUTPATIENT)
Dept: OBGYN CLINIC | Facility: CLINIC | Age: 73
End: 2023-12-13
Payer: COMMERCIAL

## 2023-12-13 ENCOUNTER — APPOINTMENT (OUTPATIENT)
Dept: RADIOLOGY | Facility: AMBULARY SURGERY CENTER | Age: 73
End: 2023-12-13
Attending: ORTHOPAEDIC SURGERY
Payer: COMMERCIAL

## 2023-12-13 ENCOUNTER — RA CDI HCC (OUTPATIENT)
Dept: OTHER | Facility: HOSPITAL | Age: 73
End: 2023-12-13

## 2023-12-13 VITALS — HEIGHT: 65 IN | WEIGHT: 229 LBS | BODY MASS INDEX: 38.15 KG/M2

## 2023-12-13 DIAGNOSIS — S93.421A SPRAIN OF DELTOID LIGAMENT OF RIGHT ANKLE, INITIAL ENCOUNTER: Primary | ICD-10-CM

## 2023-12-13 DIAGNOSIS — Z01.89 ENCOUNTER FOR LOWER EXTREMITY COMPARISON IMAGING STUDY: ICD-10-CM

## 2023-12-13 DIAGNOSIS — M25.571 PAIN, JOINT, ANKLE AND FOOT, RIGHT: ICD-10-CM

## 2023-12-13 DIAGNOSIS — R29.898 ANKLE WEAKNESS: ICD-10-CM

## 2023-12-13 PROCEDURE — 73600 X-RAY EXAM OF ANKLE: CPT

## 2023-12-13 PROCEDURE — 99213 OFFICE O/P EST LOW 20 MIN: CPT | Performed by: ORTHOPAEDIC SURGERY

## 2023-12-13 PROCEDURE — 73610 X-RAY EXAM OF ANKLE: CPT

## 2023-12-13 NOTE — PROGRESS NOTES
[address] [facility] coding opportunities       Chart reviewed, no opportunity found: [address]        Patients Insurance     Medicare Insurance: [employer] Advantage

## 2023-12-13 NOTE — PROGRESS NOTES
Hannah Mathews M.D. Attending, Orthopaedic Surgery  Foot and [facility] [facility]      ORTHOPAEDIC FOOT AND ANKLE CLINIC VISIT     Assessment:     Encounter Diagnoses   Name Primary? Pain, joint, ankle and foot, right     Encounter for lower extremity comparison imaging study     Sprain of deltoid ligament of right ankle, initial encounter Yes    Ankle weakness             Plan:   The patient verbalized understanding of exam findings and treatment plan. We engaged in the shared decision-making process and treatment options were discussed at length with the patient. Surgical and conservative management discussed today along with risks and benefits. Patient has a right deltoid ligament sprain and significant ankle weakness in all planes   He is 2.5 years s/p right lateral mal ORIF with syndesmosis repair  Xray demonstrates no acute osseous abnormalities, new fractures or hardware displacement  Begin physical therapy immediately  Activity modification, OTC pain meds, ice, and elevation for pain control   Compression socks for swelling control   Return in about 7 weeks (around 1/31/2024). History of Present Illness:   Chief Complaint:   Chief Complaint   Patient presents with    Right Ankle - Follow-up     Addy Donahue. is a 74 y.o. male who is being seen in follow-up for Right ankle. Patient states he noted new pain on 12/9/23 that has cause him difficulty ambulating. Denies a new trauma or injury. He is 2.5 years s/p right lateral mal ORIF with syndesmosis repair. When we last saw he we recommended WBAT. Residual pain is localized at medial malleolus with minimal radiating and described as sharp and severe.       Pain/symptom timing:  Worse during the day when active  Pain/symptom context:  Worse with activites and work  Pain/symptom modifying factors:  Rest makes better, activities make worse  Pain/symptom associated signs/symptoms: none    Prior treatment   NSAIDsYes Injections No   Bracing/Orthotics Yes    Physical Therapy Yes     Orthopedic Surgical History:   See below    Past Medical, Surgical and Social History:  Past Medical History:  has a past medical history of Cat-scratch disease, Cellulitis, Erysipelas, Parathyroid adenoma, Rosacea, Thrombocytopenia ([address]), and Tick bites. Problem List: does not have any pertinent problems on file. Past Surgical History:  has a past surgical history that includes Lymphadenectomy; Appendectomy; Parathyroidectomy; Tooth extraction; and pr open tx trimalleolar ankle fx w/o fixj pst lip (Right, 5/17/2021). Family History: family history includes Aortic aneurysm in his mother; Breast cancer in his paternal aunt and paternal grandmother; Diabetes in his paternal uncle; Heart attack in his father; Hypertension in his mother. Social History:  reports that he has quit smoking. He has never used smokeless tobacco. He reports that he does not drink alcohol and does not use drugs. Current Medications: has a current medication list which includes the following prescription(s): vitamin c, atenolol, calcium citrate-vitamin d, vitamin d3, econazole nitrate, famotidine, hydrochlorothiazide, hydrocortisone, ilevro, ketoconazole, lorazepam, losartan, magnesium, metronidazole, metronidazole, multiple vitamin, alprazolam, cephalexin, doxycycline, escitalopram, ondansetron, and potassium chloride. Allergies: is allergic to morphine.      Review of Systems:  General- denies fever/chills  HEENT- denies hearing loss or sore throat  Eyes- denies eye pain or visual disturbances, denies red eyes  Respiratory- denies cough or SOB  Cardio- denies chest pain or palpitations  GI- denies abdominal pain  Endocrine- denies urinary frequency  Urinary- denies pain with urination  Musculoskeletal- Negative except noted above  Skin- denies rashes or wounds  Neurological- denies dizziness or headache  Psychiatric- denies anxiety or difficulty concentrating    Physical Exam:   Ht 5' 5" (1.651 m)   Wt 104 kg (229 lb)   BMI 38.11 kg/m²   General/Constitutional: No apparent distress: well-nourished and well developed. Eyes: normal ocular motion  Lymphatic: No appreciable lymphadenopathy  Respiratory: Non-labored breathing  Vascular: No edema, swelling or tenderness, except as noted in detailed exam.  Integumentary: No impressive skin lesions present, except as noted in detailed exam.  Neuro: No ataxia or tremors noted  Psych: Normal mood and affect, oriented to person, place and time. Appropriate affect. Musculoskeletal: Normal, except as noted in detailed exam and in HPI. Examination    Right    Gait Normal and Antalgic   Musculoskeletal Tender to palpation at deltoid ligament    Skin Normal.  Well-healed incisions. Nails Normal    Range of Motion  15 degrees dorsiflexion, 30 degrees plantarflexion  Subtalar motion: normal    Stability Stable    Muscle Strength 4/5 tibialis anterior  4/5 gastrocnemius-soleus  4/5 posterior tibialis  4/5 peroneal/eversion strength  5/5 EHL  5/5 FHL    Neurologic Normal    Sensation  Intact to light touch throughout sural, saphenous, superficial peroneal, deep peroneal and medial/lateral plantar nerve distributions. Ludlow-Cielo 5.07 filament (10g) testing  deferred. Cardiovascular Brisk capillary refill < 2 seconds,intact DP and PT pulses    Special Tests None      Imaging Studies:   3 views of the Right ankle were obtained, reviewed and interpreted independently which demonstrate no acute osseous abnormalities. Reviewed by me personally. Scribe Attestation      I,:  Fab Rodriguez PA-C am acting as a scribe while in the presence of the attending physician.:       I,:  Devante Lopez MD personally performed the services described in this documentation    as scribed in my presence.:           Renetta Angulo.  Lachman, MD  Foot & Ankle Surgery   Department of [address] [place] [address]      I personally performed the service. Loree Marques.  Lachman, MD

## 2023-12-15 ENCOUNTER — TELEPHONE (OUTPATIENT)
Age: 73
End: 2023-12-15

## 2023-12-15 NOTE — TELEPHONE ENCOUNTER
Called and spoke w/Vashti at [facility] and informed her that she should call the pt's PCP and alert them as that is a very high BP and needs to be addressed by PCP.

## 2023-12-15 NOTE — TELEPHONE ENCOUNTER
Caller: Josias from [facility]    Doctor: Lachman     Reason for call:     Jovanny Rojas is calling about this patient, he was at the physical therapy and they are stating  His blood pressure reading was 192/116, he stated he did not take his blood pressure medication yesterday. She was concerned he may not be taking it. Just wanted to let the office know. .    Call back#: [phone], opt # 2

## 2023-12-15 NOTE — TELEPHONE ENCOUNTER
This message came to the wrong office. This would be something that should be communicated with the patients primary care office. Can you reach out to [facility] and let them know?

## 2023-12-20 ENCOUNTER — OFFICE VISIT (OUTPATIENT)
Dept: FAMILY MEDICINE CLINIC | Facility: CLINIC | Age: 73
End: 2023-12-20
Payer: COMMERCIAL

## 2023-12-20 VITALS
DIASTOLIC BLOOD PRESSURE: 86 MMHG | BODY MASS INDEX: 37.99 KG/M2 | OXYGEN SATURATION: 96 % | HEIGHT: 65 IN | TEMPERATURE: 98 F | WEIGHT: 228 LBS | RESPIRATION RATE: 18 BRPM | SYSTOLIC BLOOD PRESSURE: 132 MMHG | HEART RATE: 70 BPM

## 2023-12-20 DIAGNOSIS — I10 ESSENTIAL HYPERTENSION: Primary | ICD-10-CM

## 2023-12-20 DIAGNOSIS — F33.0 MILD EPISODE OF RECURRENT MAJOR DEPRESSIVE DISORDER (HCC): ICD-10-CM

## 2023-12-20 DIAGNOSIS — F41.9 ANXIETY: ICD-10-CM

## 2023-12-20 DIAGNOSIS — E21.3 HYPERPARATHYROIDISM (HCC): ICD-10-CM

## 2023-12-20 DIAGNOSIS — D69.6 THROMBOCYTOPENIA (HCC): ICD-10-CM

## 2023-12-20 DIAGNOSIS — E78.1 HYPERTRIGLYCERIDEMIA: ICD-10-CM

## 2023-12-20 PROCEDURE — 99214 OFFICE O/P EST MOD 30 MIN: CPT | Performed by: FAMILY MEDICINE

## 2023-12-20 RX ORDER — LORAZEPAM 1 MG/1
1 TABLET ORAL 3 TIMES DAILY
Qty: 90 TABLET | Refills: 5 | Status: SHIPPED | OUTPATIENT
Start: 2023-12-20 | End: 2024-06-17

## 2023-12-20 RX ORDER — FENOFIBRATE 145 MG/1
145 TABLET, COATED ORAL DAILY
Qty: 90 TABLET | Refills: 1 | Status: SHIPPED | OUTPATIENT
Start: 2023-12-20

## 2023-12-20 RX ORDER — ALPRAZOLAM 0.25 MG/1
0.25 TABLET ORAL 4 TIMES DAILY
Qty: 120 TABLET | Refills: 5 | Status: SHIPPED | OUTPATIENT
Start: 2023-12-20 | End: 2024-06-17

## 2023-12-20 NOTE — PROGRESS NOTES
Name: Mao Gonsales Jr.      : 1950      MRN: [id number]  Encounter Provider: Sekou Hackett DO  Encounter Date: 2023   Encounter department: [facility]    Assessment & Plan   BMI Counseling: Body mass index is 37.94 kg/m². The BMI is above normal. Nutrition recommendations include reducing portion sizes and moderation in carbohydrate intake.  Chief Complaint   Patient presents with    Hypertension    Anxiety    Depression      1. Essential hypertension  -     CBC and Platelet; Future    2. Anxiety  -     ALPRAZolam (XANAX) 0.25 mg tablet; Take 1 tablet (0.25 mg total) by mouth 4 (four) times a day  -     LORazepam (ATIVAN) 1 mg tablet; Take 1 tablet (1 mg total) by mouth 3 (three) times a day    3. Mild episode of recurrent major depressive disorder (HCC)    4. Hypertriglyceridemia  -     Cholesterol, total; Future  -     Basic metabolic panel; Future; Expected date: 2023  -     Hepatic function panel; Future  -     fenofibrate (TRICOR) 145 mg tablet; Take 1 tablet (145 mg total) by mouth daily    5. Hyperparathyroidism (HCC)  -     PTH, intact; Future    6. Thrombocytopenia (HCC)           Subjective     Labs and refills.  Sleeping Ok, anxiety controlled.      Review of Systems   Constitutional: Negative.    HENT: Negative.     Eyes: Negative.    Respiratory: Negative.     Cardiovascular: Negative.    Gastrointestinal: Negative.    Genitourinary: Negative.    Musculoskeletal: Negative.    Skin: Negative.    Neurological: Negative.    Psychiatric/Behavioral: Negative.         Past Medical History:   Diagnosis Date    Cat-scratch disease     Cellulitis     Erysipelas     last assessed-10/14/2013    Parathyroid adenoma     Rosacea     Thrombocytopenia (HCC)     last assessed-2017    Tick bites     last assessed-10/14/2013     Past Surgical History:   Procedure Laterality Date    APPENDECTOMY          LYMPHADENECTOMY      PARATHYROIDECTOMY      MT OPEN TX TRIMALLEOLAR  ANKLE FX W/O FIXJ PST LIP Right 5/17/2021    Procedure: OPEN REDUCTION W/ INTERNAL FIXATION (ORIF) ANKLE;  Surgeon: James R Lachman, MD;  Location:  MAIN OR;  Service: Orthopedics    TOOTH EXTRACTION       Family History   Problem Relation Age of Onset    Aortic aneurysm Mother     Hypertension Mother     Heart attack Father         acute MI    Breast cancer Paternal Grandmother     Breast cancer Paternal Aunt     Diabetes Paternal Uncle      Social History     Socioeconomic History    Marital status: /Civil Union     Spouse name: None    Number of children: None    Years of education: None    Highest education level: None   Occupational History    None   Tobacco Use    Smoking status: Former    Smokeless tobacco: Never   Vaping Use    Vaping status: Every Day    Start date: 2/12/2018    Substances: Nicotine   Substance and Sexual Activity    Alcohol use: No    Drug use: Never    Sexual activity: None   Other Topics Concern    None   Social History Narrative    Advance directive in chart     Social Determinants of Health     Financial Resource Strain: Low Risk  (8/16/2023)    Overall Financial Resource Strain (CARDIA)     Difficulty of Paying Living Expenses: Not hard at all   Food Insecurity: Not on file   Transportation Needs: No Transportation Needs (8/16/2023)    PRAPARE - Transportation     Lack of Transportation (Medical): No     Lack of Transportation (Non-Medical): No   Physical Activity: Not on file   Stress: Not on file   Social Connections: Not on file   Intimate Partner Violence: Not on file   Housing Stability: Not on file     Current Outpatient Medications on File Prior to Visit   Medication Sig    Ascorbic Acid (VITAMIN C) 100 MG CHEW Chew 1 tablet daily    atenolol (TENORMIN) 100 mg tablet Take 1 tablet (100 mg total) by mouth 2 (two) times a day    calcium citrate-vitamin d 500-500 MG-UNIT CHEW chewable tablet Chew    Cholecalciferol (VITAMIN D3) 1000 units CAPS Take by mouth    econazole  nitrate 1 % cream Apply topically daily    escitalopram (LEXAPRO) 10 mg tablet Take 1 tablet (10 mg total) by mouth daily    famotidine (PEPCID) 40 MG tablet Take 1 tablet (40 mg total) by mouth daily    hydrochlorothiazide (HYDRODIURIL) 25 mg tablet Take 1 tablet (25 mg total) by mouth daily    hydrocortisone 2.5 % cream Apply topically    ILEVRO 0.3 % SUSP     ketoconazole (NIZORAL) 2 % cream APPLY TO SKIN TWICE DAILY AS DIRECTED    losartan (COZAAR) 100 MG tablet Take 1 tablet (100 mg total) by mouth daily    Magnesium 250 MG TABS Take by mouth    metroNIDAZOLE (METROGEL) 0.75 % gel APPLY TO SKIN EVERY DAY    metroNIDAZOLE (METROGEL) 1 % gel Apply topically    Multiple Vitamin (MULTIVITAMINS PO) Take 1 tablet by mouth daily    potassium chloride (Klor-Con 10) 10 mEq tablet Take 1 tablet (10 mEq total) by mouth daily    [DISCONTINUED] LORazepam (ATIVAN) 1 mg tablet Take 1 tablet (1 mg total) by mouth 3 (three) times a day    ondansetron (ZOFRAN) 4 mg tablet Take 1 tablet (4 mg total) by mouth every 8 (eight) hours as needed for nausea or vomiting (Patient not taking: Reported on 8/16/2023)    [DISCONTINUED] ALPRAZolam (XANAX) 0.25 mg tablet Take 1 tablet (0.25 mg total) by mouth 4 (four) times a day    [DISCONTINUED] cephalexin (KEFLEX) 500 mg capsule Take 500 mg by mouth 2 (two) times a day (Patient not taking: Reported on 8/16/2023)    [DISCONTINUED] doxycycline (DORYX) 150 MG EC tablet Take 150 mg by mouth daily (Patient not taking: Reported on 4/12/2023)     Allergies   Allergen Reactions    Morphine Hallucinations     Immunization History   Administered Date(s) Administered    COVID-19 PFIZER VACCINE 0.3 ML IM 04/30/2021, 05/21/2021, 11/27/2021    COVID-19 Pfizer vac (Ronald-sucrose, gray cap) 12 yr+ IM 04/30/2022    H1N1, All Formulations 01/09/2010    INFLUENZA 10/22/2007, 10/17/2008, 10/16/2009, 10/22/2010, 09/16/2016, 09/30/2018, 09/05/2020, 10/10/2021    Influenza Quadrivalent, 6-35 Months IM 09/16/2016     "Influenza Split High Dose Preservative Free IM 10/26/2015, 10/09/2016, 09/30/2018, 09/24/2019    Pneumococcal Polysaccharide PPV23 10/22/2007, 12/23/2013, 09/30/2018    TD (adult) Preservative Free 08/14/2018    Td (adult), adsorbed 06/09/2008, 08/14/2018    Zoster 12/01/2009, 08/03/2017       Objective     /86 (BP Location: Left arm, Patient Position: Sitting, Cuff Size: Standard)   Pulse 70   Temp 98 °F (36.7 °C) (Oral)   Resp 18   Ht 5' 5\" (1.651 m)   Wt 103 kg (228 lb)   SpO2 96%   BMI 37.94 kg/m²     Physical Exam  Constitutional:       Appearance: He is well-developed.   HENT:      Head: Normocephalic and atraumatic.      Right Ear: Ear canal and external ear normal.      Left Ear: Ear canal and external ear normal.      Nose: Nose normal.      Mouth/Throat:      Mouth: Mucous membranes are moist.      Pharynx: Oropharynx is clear.   Eyes:      Conjunctiva/sclera: Conjunctivae normal.      Pupils: Pupils are equal, round, and reactive to light.   Cardiovascular:      Rate and Rhythm: Normal rate and regular rhythm.      Heart sounds: Normal heart sounds.   Pulmonary:      Effort: Pulmonary effort is normal.      Breath sounds: Normal breath sounds.   Abdominal:      General: Abdomen is flat. Bowel sounds are normal.      Palpations: Abdomen is soft.   Musculoskeletal:      Cervical back: Normal range of motion and neck supple.   Skin:     General: Skin is warm and dry.   Neurological:      Mental Status: He is alert and oriented to person, place, and time.      Deep Tendon Reflexes: Reflexes are normal and symmetric.   Psychiatric:         Mood and Affect: Mood normal.         Behavior: Behavior normal.         Thought Content: Thought content normal.         Judgment: Judgment normal.       Sekou Hackett, DO    "

## 2023-12-28 NOTE — PROGRESS NOTES
Assessment/Plan: Pt is here for 3 week follow up, and to review blood work  No problem-specific Assessment & Plan notes found for this encounter  1  Essential hypertension     2  Anxiety     3  Primary insomnia     4  Current mild episode of major depressive disorder without prior episode ([address] )     5  Hypertriglyceridemia            There are no diagnoses linked to this encounter  Subjective:      Patient ID: Juan Jose Hadley  is a 77 y o  male  Follow up review labs  Depression and anxiety [facility]  Changed diet to Atkins, starting to walk  The following portions of the patient's history were reviewed and updated as appropriate: allergies, current medications, past family history, past medical history, past social history, past surgical history and problem list     Review of Systems   Constitutional: Negative  HENT: Negative  Respiratory: Negative  Cardiovascular: Negative  Gastrointestinal: Negative  Genitourinary: Negative  Musculoskeletal: Negative  Skin: Negative  Neurological: Negative  Psychiatric/Behavioral: Negative  Negative for self-injury and suicidal ideas  The patient is not nervous/anxious  Objective:      /80 (BP Location: Left arm)   Pulse 81   Temp 98 5 °F (36 9 °C)   Ht 5' 5" (1 651 m)   Wt 102 kg (225 lb 12 8 oz)   SpO2 96%   BMI 37 58 kg/m²          Physical Exam   Constitutional: He is oriented to person, place, and time  He appears well-developed and well-nourished  HENT:   Head: Normocephalic and atraumatic  Right Ear: External ear normal    Left Ear: External ear normal    Cardiovascular: Normal rate, regular rhythm and normal heart sounds  Pulmonary/Chest: Effort normal and breath sounds normal    Abdominal: Soft  Bowel sounds are normal    Genitourinary: Prostate normal    Neurological: He is alert and oriented to person, place, and time  Skin: Skin is warm and dry  YES   Psychiatric: He has a normal mood and affect   His behavior is normal  Judgment and thought content normal

## 2024-01-31 ENCOUNTER — OFFICE VISIT (OUTPATIENT)
Dept: OBGYN CLINIC | Facility: CLINIC | Age: 74
End: 2024-01-31
Payer: COMMERCIAL

## 2024-01-31 VITALS — HEIGHT: 65 IN | WEIGHT: 228 LBS | BODY MASS INDEX: 37.99 KG/M2

## 2024-01-31 DIAGNOSIS — S93.421D SPRAIN OF DELTOID LIGAMENT OF RIGHT ANKLE, SUBSEQUENT ENCOUNTER: Primary | ICD-10-CM

## 2024-01-31 PROCEDURE — 1159F MED LIST DOCD IN RCRD: CPT | Performed by: ORTHOPAEDIC SURGERY

## 2024-01-31 PROCEDURE — 99213 OFFICE O/P EST LOW 20 MIN: CPT | Performed by: ORTHOPAEDIC SURGERY

## 2024-01-31 PROCEDURE — 1160F RVW MEDS BY RX/DR IN RCRD: CPT | Performed by: ORTHOPAEDIC SURGERY

## 2024-01-31 NOTE — PROGRESS NOTES
James R Lachman, M.D.  Attending, Orthopaedic Surgery  Foot and Ankle  [facility]      ORTHOPAEDIC FOOT AND ANKLE CLINIC VISIT     Assessment:     Encounter Diagnosis   Name Primary?    Sprain of deltoid ligament of right ankle, subsequent encounter Yes        Plan:   The patient verbalized understanding of exam findings and treatment plan. We engaged in the shared decision-making process and treatment options were discussed at length with the patient. Surgical and conservative management discussed today along with risks and benefits.  Patient has made improvements in his strength and pain since beginning treatment  Continue formal physical therapy and supplement with a home exercise program  Rest, ice and elevation prn for pain relief  Compression stocking for swelling control  Return if symptoms worsen or fail to improve.      History of Present Illness:   Chief Complaint:   Chief Complaint   Patient presents with    Right Ankle - Follow-up     Pt has been going to PT, still feeling a little sore.     Mao Gonsales JrMadina is a 73 y.o. male who is being seen in follow-up for Right ankle pain/weakness. When we last saw he we recommended PT/HEP.  Pain has improved. Residual pain is localized at medial ankle with minimal radiating and described as mild and aching     Pain/symptom timing:  Worse during the day when active  Pain/symptom context:  Worse with activites and work  Pain/symptom modifying factors:  Rest makes better, activities make worse  Pain/symptom associated signs/symptoms: none    Prior treatment   NSAIDsYes   Injections No   Bracing/Orthotics Yes    Physical Therapy Yes     Orthopedic Surgical History:   See below    Past Medical, Surgical and Social History:  Past Medical History:  has a past medical history of Cat-scratch disease, Cellulitis, Erysipelas, Parathyroid adenoma, Rosacea, Thrombocytopenia (HCC), and Tick bites.  Problem List: does not have any pertinent  "problems on file.  Past Surgical History:  has a past surgical history that includes Lymphadenectomy; Appendectomy; Parathyroidectomy; Tooth extraction; and pr open tx trimalleolar ankle fx w/o fixj pst lip (Right, 5/17/2021).  Family History: family history includes Aortic aneurysm in his mother; Breast cancer in his paternal aunt and paternal grandmother; Diabetes in his paternal uncle; Heart attack in his father; Hypertension in his mother.  Social History:  reports that he has quit smoking. He has never used smokeless tobacco. He reports that he does not drink alcohol and does not use drugs.  Current Medications: has a current medication list which includes the following prescription(s): alprazolam, vitamin c, atenolol, calcium citrate-vitamin d, vitamin d3, econazole nitrate, famotidine, fenofibrate, hydrochlorothiazide, hydrocortisone, ilevro, ketoconazole, lorazepam, losartan, magnesium, metronidazole, metronidazole, multiple vitamin, escitalopram, ondansetron, and potassium chloride.  Allergies: is allergic to morphine.     Review of Systems:  General- denies fever/chills  HEENT- denies hearing loss or sore throat  Eyes- denies eye pain or visual disturbances, denies red eyes  Respiratory- denies cough or SOB  Cardio- denies chest pain or palpitations  GI- denies abdominal pain  Endocrine- denies urinary frequency  Urinary- denies pain with urination  Musculoskeletal- Negative except noted above  Skin- denies rashes or wounds  Neurological- denies dizziness or headache  Psychiatric- denies anxiety or difficulty concentrating    Physical Exam:   Ht 5' 5\" (1.651 m)   Wt 103 kg (228 lb)   BMI 37.94 kg/m²   General/Constitutional: No apparent distress: well-nourished and well developed.  Eyes: normal ocular motion  Lymphatic: No appreciable lymphadenopathy  Respiratory: Non-labored breathing  Vascular: No edema, swelling or tenderness, except as noted in detailed exam.  Integumentary: No impressive skin " lesions present, except as noted in detailed exam.  Neuro: No ataxia or tremors noted  Psych: Normal mood and affect, oriented to person, place and time. Appropriate affect.  Musculoskeletal: Normal, except as noted in detailed exam and in HPI.    Examination    Right    Gait Normal   Musculoskeletal Non tender to palpation    Skin Normal.  Well-healed incisions.    Nails Normal    Range of Motion  15 degrees dorsiflexion, 30 degrees plantarflexion  Subtalar motion: normal    Stability Stable    Muscle Strength 5/5 tibialis anterior  5/5 gastrocnemius-soleus  5/5 posterior tibialis  5/5 peroneal/eversion strength  5/5 EHL  5/5 FHL    Neurologic Normal    Sensation Intact to light touch throughout sural, saphenous, superficial peroneal, deep peroneal and medial/lateral plantar nerve distributions.  [place]-Cielo 5.07 filament (10g) testing deferred.    Cardiovascular Brisk capillary refill < 2 seconds,intact DP and PT pulses    Special Tests None      Imaging Studies:   No new imaging    James R. Lachman, MD  Foot & Ankle Surgery   Department of Orthopaedic Surgery  [facility]      I personally performed the service.    James R. Lachman, MD    Scribe Attestation      I,:  Dexter Wayne am acting as a scribe while in the presence of the attending physician.:       I,:  James R Lachman, MD personally performed the services described in this documentation    as scribed in my presence.:

## 2024-02-16 ENCOUNTER — OFFICE VISIT (OUTPATIENT)
Dept: FAMILY MEDICINE CLINIC | Facility: CLINIC | Age: 74
End: 2024-02-16
Payer: COMMERCIAL

## 2024-02-16 VITALS
OXYGEN SATURATION: 98 % | DIASTOLIC BLOOD PRESSURE: 74 MMHG | WEIGHT: 224.6 LBS | SYSTOLIC BLOOD PRESSURE: 126 MMHG | HEIGHT: 65 IN | TEMPERATURE: 97.1 F | BODY MASS INDEX: 37.42 KG/M2 | HEART RATE: 79 BPM

## 2024-02-16 DIAGNOSIS — F33.0 MILD EPISODE OF RECURRENT MAJOR DEPRESSIVE DISORDER (HCC): ICD-10-CM

## 2024-02-16 DIAGNOSIS — R05.1 ACUTE COUGH: ICD-10-CM

## 2024-02-16 DIAGNOSIS — J40 BRONCHITIS: ICD-10-CM

## 2024-02-16 DIAGNOSIS — J06.9 VIRAL UPPER RESPIRATORY TRACT INFECTION: Primary | ICD-10-CM

## 2024-02-16 DIAGNOSIS — D69.6 THROMBOCYTOPENIA (HCC): ICD-10-CM

## 2024-02-16 PROCEDURE — 99213 OFFICE O/P EST LOW 20 MIN: CPT | Performed by: FAMILY MEDICINE

## 2024-02-16 RX ORDER — METHYLPREDNISOLONE 4 MG/1
TABLET ORAL
Qty: 21 EACH | Refills: 0 | Status: SHIPPED | OUTPATIENT
Start: 2024-02-16

## 2024-02-16 RX ORDER — AZITHROMYCIN 250 MG/1
TABLET, FILM COATED ORAL DAILY
Qty: 6 TABLET | Refills: 0 | Status: SHIPPED | OUTPATIENT
Start: 2024-02-16 | End: 2024-02-21

## 2024-02-16 NOTE — PROGRESS NOTES
Name: Mao Gonsales Jr.      : 1950      MRN: [id number]  Encounter Provider: Sekou Hackett DO  Encounter Date: 2024   Encounter department: [facility]    Chief Complaint   Patient presents with    Sore Throat    Cough     Dry     Nasal Congestion     Wetness in nasal passage        Assessment & Plan     1. Viral upper respiratory tract infection    2. Acute cough  -     methylPREDNISolone 4 MG tablet therapy pack; Use as directed on package    3. Bronchitis  -     azithromycin (Zithromax) 250 mg tablet; Take 2 tablets (500 mg total) by mouth daily for 1 day, THEN 1 tablet (250 mg total) daily for 4 days.    4. Mild episode of recurrent major depressive disorder (HCC)    5. Thrombocytopenia (HCC)           Subjective     Head congestion and cough HS, past couple days - same as wife.      Review of Systems   Constitutional:  Positive for chills.   HENT:  Positive for congestion.    Respiratory:  Positive for cough.    Gastrointestinal: Negative.    Neurological: Negative.    Psychiatric/Behavioral: Negative.         Past Medical History:   Diagnosis Date    Cat-scratch disease     Cellulitis     Erysipelas     last assessed-10/14/2013    Parathyroid adenoma     Rosacea     Thrombocytopenia (HCC)     last assessed-2017    Tick bites     last assessed-10/14/2013     Past Surgical History:   Procedure Laterality Date    APPENDECTOMY          LYMPHADENECTOMY      PARATHYROIDECTOMY      [place] OPEN TX TRIMALLEOLAR ANKLE FX W/O FIXJ PST LIP Right 2021    Procedure: OPEN REDUCTION W/ INTERNAL FIXATION (ORIF) ANKLE;  Surgeon: James R Lachman, MD;  Location:  MAIN OR;  Service: Orthopedics    TOOTH EXTRACTION       Family History   Problem Relation Age of Onset    Aortic aneurysm Mother     Hypertension Mother     Heart attack Father         acute MI    Breast cancer Paternal Grandmother     Breast cancer Paternal Aunt     Diabetes Paternal Uncle      Social History      Socioeconomic History    Marital status: /Civil Union     Spouse name: None    Number of children: None    Years of education: None    Highest education level: None   Occupational History    None   Tobacco Use    Smoking status: Former    Smokeless tobacco: Never   Vaping Use    Vaping status: Every Day    Start date: 2/12/2018    Substances: Nicotine   Substance and Sexual Activity    Alcohol use: No    Drug use: Never    Sexual activity: None   Other Topics Concern    None   Social History Narrative    Advance directive in chart     Social Determinants of Health     Financial Resource Strain: Low Risk  (8/16/2023)    Overall Financial Resource Strain (CARDIA)     Difficulty of Paying Living Expenses: Not hard at all   Food Insecurity: Not on file   Transportation Needs: No Transportation Needs (8/16/2023)    PRAPARE - Transportation     Lack of Transportation (Medical): No     Lack of Transportation (Non-Medical): No   Physical Activity: Not on file   Stress: Not on file   Social Connections: Not on file   Intimate Partner Violence: Not on file   Housing Stability: Not on file     Current Outpatient Medications on File Prior to Visit   Medication Sig    ALPRAZolam (XANAX) 0.25 mg tablet Take 1 tablet (0.25 mg total) by mouth 4 (four) times a day    Ascorbic Acid (VITAMIN C) 100 MG CHEW Chew 1 tablet daily    atenolol (TENORMIN) 100 mg tablet Take 1 tablet (100 mg total) by mouth 2 (two) times a day    calcium citrate-vitamin d 500-500 MG-UNIT CHEW chewable tablet Chew    Cholecalciferol (VITAMIN D3) 1000 units CAPS Take by mouth    econazole nitrate 1 % cream Apply topically daily    escitalopram (LEXAPRO) 10 mg tablet Take 1 tablet (10 mg total) by mouth daily    famotidine (PEPCID) 40 MG tablet Take 1 tablet (40 mg total) by mouth daily    fenofibrate (TRICOR) 145 mg tablet Take 1 tablet (145 mg total) by mouth daily    hydrochlorothiazide (HYDRODIURIL) 25 mg tablet Take 1 tablet (25 mg total) by  "mouth daily    hydrocortisone 2.5 % cream Apply topically    ILEVRO 0.3 % SUSP     ketoconazole (NIZORAL) 2 % cream APPLY TO SKIN TWICE DAILY AS DIRECTED    LORazepam (ATIVAN) 1 mg tablet Take 1 tablet (1 mg total) by mouth 3 (three) times a day    losartan (COZAAR) 100 MG tablet Take 1 tablet (100 mg total) by mouth daily    Magnesium 250 MG TABS Take by mouth    metroNIDAZOLE (METROGEL) 0.75 % gel APPLY TO SKIN EVERY DAY    metroNIDAZOLE (METROGEL) 1 % gel Apply topically    Multiple Vitamin (MULTIVITAMINS PO) Take 1 tablet by mouth daily    ondansetron (ZOFRAN) 4 mg tablet Take 1 tablet (4 mg total) by mouth every 8 (eight) hours as needed for nausea or vomiting    potassium chloride (Klor-Con 10) 10 mEq tablet Take 1 tablet (10 mEq total) by mouth daily     Allergies   Allergen Reactions    Morphine Hallucinations     Immunization History   Administered Date(s) Administered    COVID-19 PFIZER VACCINE 0.3 ML IM 04/30/2021, 05/21/2021, 11/27/2021    COVID-19 Pfizer vac (Ronald-sucrose, gray cap) 12 yr+ IM 04/30/2022    H1N1, All Formulations 01/09/2010    INFLUENZA 10/22/2007, 10/17/2008, 10/16/2009, 10/22/2010, 09/16/2016, 09/30/2018, 09/05/2020, 10/10/2021    Influenza Quadrivalent, 6-35 Months IM 09/16/2016    Influenza Split High Dose Preservative Free IM 10/26/2015, 10/09/2016, 09/30/2018, 09/24/2019    Pneumococcal Polysaccharide PPV23 10/22/2007, 12/23/2013, 09/30/2018    TD (adult) Preservative Free 08/14/2018    Td (adult), adsorbed 06/09/2008, 08/14/2018    Zoster 12/01/2009, 08/03/2017       Objective     /74 (BP Location: Left arm, Patient Position: Sitting, Cuff Size: Large)   Pulse 79   Temp (!) 97.1 °F (36.2 °C) (Temporal)   Ht 5' 5\" (1.651 m)   Wt 102 kg (224 lb 9.6 oz)   SpO2 98%   BMI 37.38 kg/m²     Physical Exam  Constitutional:       Appearance: He is well-developed.   HENT:      Head: Normocephalic and atraumatic.      Right Ear: Tympanic membrane, ear canal and external ear normal. "      Left Ear: Tympanic membrane, ear canal and external ear normal.      Nose: Nose normal.      Mouth/Throat:      Mouth: Mucous membranes are moist.      Pharynx: Oropharynx is clear.   Eyes:      Conjunctiva/sclera: Conjunctivae normal.      Pupils: Pupils are equal, round, and reactive to light.   Cardiovascular:      Rate and Rhythm: Normal rate and regular rhythm.      Heart sounds: Normal heart sounds.   Pulmonary:      Effort: Pulmonary effort is normal.      Comments: Mild large airway sounds ACW.  Musculoskeletal:      Cervical back: Normal range of motion and neck supple.   Skin:     General: Skin is warm and dry.   Neurological:      Mental Status: He is alert and oriented to person, place, and time.      Deep Tendon Reflexes: Reflexes are normal and symmetric.   Psychiatric:         Mood and Affect: Mood normal.         Behavior: Behavior normal.         Thought Content: Thought content normal.         Judgment: Judgment normal.       Sekou Hackett, DO

## 2024-02-27 ENCOUNTER — TELEPHONE (OUTPATIENT)
Dept: FAMILY MEDICINE CLINIC | Facility: CLINIC | Age: 74
End: 2024-02-27

## 2024-02-27 NOTE — TELEPHONE ENCOUNTER
Patient came to office stating he has physical therapy for his sprained ankle but needs a form signed by PCP if it is ok to use the Physical Fitness equipment and to be able to exercise. Will  form once completed.

## 2024-05-10 LAB
ALBUMIN SERPL-MCNC: 4.5 G/DL (ref 3.6–5.1)
ALBUMIN/GLOB SERPL: 2 (CALC) (ref 1–2.5)
ALP SERPL-CCNC: 43 U/L (ref 35–144)
ALT SERPL-CCNC: 34 U/L (ref 9–46)
AST SERPL-CCNC: 26 U/L (ref 10–35)
BASOPHILS # BLD AUTO: 39 CELLS/UL (ref 0–200)
BASOPHILS NFR BLD AUTO: 0.8 %
BILIRUB DIRECT SERPL-MCNC: 0.1 MG/DL
BILIRUB INDIRECT SERPL-MCNC: 0.7 MG/DL (CALC) (ref 0.2–1.2)
BILIRUB SERPL-MCNC: 0.8 MG/DL (ref 0.2–1.2)
BUN SERPL-MCNC: 30 MG/DL (ref 7–25)
BUN/CREAT SERPL: 29 (CALC) (ref 6–22)
CALCIUM SERPL-MCNC: 10.2 MG/DL (ref 8.6–10.3)
CALCIUM SERPL-MCNC: 10.2 MG/DL (ref 8.6–10.3)
CHLORIDE SERPL-SCNC: 103 MMOL/L (ref 98–110)
CHOLEST SERPL-MCNC: 178 MG/DL
CO2 SERPL-SCNC: 31 MMOL/L (ref 20–32)
CREAT SERPL-MCNC: 1.02 MG/DL (ref 0.7–1.28)
EOSINOPHIL # BLD AUTO: 132 CELLS/UL (ref 15–500)
EOSINOPHIL NFR BLD AUTO: 2.7 %
ERYTHROCYTE [DISTWIDTH] IN BLOOD BY AUTOMATED COUNT: 11.8 % (ref 11–15)
GFR/BSA.PRED SERPLBLD CYS-BASED-ARV: 77 ML/MIN/1.73M2
GLOBULIN SER CALC-MCNC: 2.3 G/DL (CALC) (ref 1.9–3.7)
GLUCOSE SERPL-MCNC: 99 MG/DL (ref 65–99)
HCT VFR BLD AUTO: 46.3 % (ref 38.5–50)
HGB BLD-MCNC: 16 G/DL (ref 13.2–17.1)
LYMPHOCYTES # BLD AUTO: 1235 CELLS/UL (ref 850–3900)
LYMPHOCYTES NFR BLD AUTO: 25.2 %
MCH RBC QN AUTO: 32.9 PG (ref 27–33)
MCHC RBC AUTO-ENTMCNC: 34.6 G/DL (ref 32–36)
MCV RBC AUTO: 95.3 FL (ref 80–100)
MONOCYTES # BLD AUTO: 613 CELLS/UL (ref 200–950)
MONOCYTES NFR BLD AUTO: 12.5 %
NEUTROPHILS # BLD AUTO: 2881 CELLS/UL (ref 1500–7800)
NEUTROPHILS NFR BLD AUTO: 58.8 %
PLATELET # BLD AUTO: 115 THOUSAND/UL (ref 140–400)
PMV BLD REES-ECKER: 11.1 FL (ref 7.5–12.5)
POTASSIUM SERPL-SCNC: 3.9 MMOL/L (ref 3.5–5.3)
PROT SERPL-MCNC: 6.8 G/DL (ref 6.1–8.1)
PTH-INTACT SERPL-MCNC: 12 PG/ML (ref 16–77)
RBC # BLD AUTO: 4.86 MILLION/UL (ref 4.2–5.8)
SL AMB PLATELET ESTIMATION: ABNORMAL
SODIUM SERPL-SCNC: 141 MMOL/L (ref 135–146)
WBC # BLD AUTO: 4.9 THOUSAND/UL (ref 3.8–10.8)

## 2024-05-29 ENCOUNTER — RA CDI HCC (OUTPATIENT)
Dept: OTHER | Facility: HOSPITAL | Age: 74
End: 2024-05-29

## 2024-06-05 ENCOUNTER — OFFICE VISIT (OUTPATIENT)
Dept: FAMILY MEDICINE CLINIC | Facility: CLINIC | Age: 74
End: 2024-06-05
Payer: COMMERCIAL

## 2024-06-05 VITALS
WEIGHT: 219 LBS | TEMPERATURE: 97.3 F | DIASTOLIC BLOOD PRESSURE: 70 MMHG | SYSTOLIC BLOOD PRESSURE: 122 MMHG | BODY MASS INDEX: 36.49 KG/M2 | HEART RATE: 75 BPM | OXYGEN SATURATION: 98 % | HEIGHT: 65 IN

## 2024-06-05 DIAGNOSIS — Z72.3 LACK OF PHYSICAL ACTIVITY: ICD-10-CM

## 2024-06-05 DIAGNOSIS — E66.01 OBESITY, MORBID (HCC): ICD-10-CM

## 2024-06-05 DIAGNOSIS — F32.0 CURRENT MILD EPISODE OF MAJOR DEPRESSIVE DISORDER WITHOUT PRIOR EPISODE (HCC): ICD-10-CM

## 2024-06-05 DIAGNOSIS — S82.851A CLOSED TRIMALLEOLAR FRACTURE OF RIGHT ANKLE, INITIAL ENCOUNTER: ICD-10-CM

## 2024-06-05 DIAGNOSIS — I10 ESSENTIAL HYPERTENSION: ICD-10-CM

## 2024-06-05 DIAGNOSIS — F41.9 ANXIETY: ICD-10-CM

## 2024-06-05 DIAGNOSIS — E21.3 HYPERPARATHYROIDISM (HCC): ICD-10-CM

## 2024-06-05 DIAGNOSIS — R53.83 TIREDNESS: Primary | ICD-10-CM

## 2024-06-05 DIAGNOSIS — E78.1 HYPERTRIGLYCERIDEMIA: ICD-10-CM

## 2024-06-05 PROCEDURE — G2211 COMPLEX E/M VISIT ADD ON: HCPCS | Performed by: FAMILY MEDICINE

## 2024-06-05 PROCEDURE — 99215 OFFICE O/P EST HI 40 MIN: CPT | Performed by: FAMILY MEDICINE

## 2024-06-05 RX ORDER — ALPRAZOLAM 0.25 MG/1
0.25 TABLET ORAL 4 TIMES DAILY
Qty: 120 TABLET | Refills: 5 | Status: SHIPPED | OUTPATIENT
Start: 2024-06-05 | End: 2024-12-02

## 2024-06-05 RX ORDER — FENOFIBRATE 145 MG/1
145 TABLET, COATED ORAL DAILY
Qty: 90 TABLET | Refills: 1 | Status: SHIPPED | OUTPATIENT
Start: 2024-06-05

## 2024-06-05 RX ORDER — HYDROCHLOROTHIAZIDE 25 MG/1
25 TABLET ORAL DAILY
Qty: 90 TABLET | Refills: 3 | Status: SHIPPED | OUTPATIENT
Start: 2024-06-05

## 2024-06-05 RX ORDER — ATENOLOL 100 MG/1
100 TABLET ORAL 2 TIMES DAILY
Qty: 180 TABLET | Refills: 3 | Status: SHIPPED | OUTPATIENT
Start: 2024-06-05

## 2024-06-05 RX ORDER — LOSARTAN POTASSIUM 100 MG/1
100 TABLET ORAL DAILY
Qty: 90 TABLET | Refills: 3 | Status: SHIPPED | OUTPATIENT
Start: 2024-06-05

## 2024-06-05 RX ORDER — ESCITALOPRAM OXALATE 10 MG/1
10 TABLET ORAL DAILY
Qty: 90 TABLET | Refills: 3 | Status: SHIPPED | OUTPATIENT
Start: 2024-06-05 | End: 2025-05-31

## 2024-06-05 NOTE — PROGRESS NOTES
Ambulatory Visit  Name: Mao Gonsales Jr.      : 1950      MRN: [id number]  Encounter Provider: Sekou Hackett DO  Encounter Date: 2024   Encounter department: [facility]    Chief Complaint   Patient presents with    Follow-up    Hypertension     BMI Counseling: Body mass index is 36.44 kg/m². The BMI is above normal. Nutrition recommendations include reducing portion sizes, consuming healthier snacks, and moderation in carbohydrate intake.    PHQ-2/9 Depression Screening    Little interest or pleasure in doing things: 0 - not at all  Feeling down, depressed, or hopeless: 0 - not at all  Trouble falling or staying asleep, or sleeping too much: 0 - not at all  Feeling tired or having little energy: 0 - not at all  Poor appetite or overeatin - not at all  Feeling bad about yourself - or that you are a failure or have let yourself or your family down: 0 - not at all  Trouble concentrating on things, such as reading the newspaper or watching television: 0 - not at all  Moving or speaking so slowly that other people could have noticed. Or the opposite - being so fidgety or restless that you have been moving around a lot more than usual: 0 - not at all  Thoughts that you would be better off dead, or of hurting yourself in some way: 0 - not at all  PHQ-9 Score: 0  PHQ-9 Interpretation: No or Minimal depression         Assessment & Plan     Sleep study.  Exercise program.      1. Tiredness  -     Ambulatory Referral to Sleep Medicine; Future  2. Anxiety  -     ALPRAZolam (XANAX) 0.25 mg tablet; Take 1 tablet (0.25 mg total) by mouth 4 (four) times a day  3. Essential hypertension  -     atenolol (TENORMIN) 100 mg tablet; Take 1 tablet (100 mg total) by mouth 2 (two) times a day  -     hydroCHLOROthiazide 25 mg tablet; Take 1 tablet (25 mg total) by mouth daily  -     losartan (COZAAR) 100 MG tablet; Take 1 tablet (100 mg total) by mouth daily  4. Current mild episode of major depressive  disorder without prior episode (HCC)  -     escitalopram (LEXAPRO) 10 mg tablet; Take 1 tablet (10 mg total) by mouth daily  5. Hypertriglyceridemia  -     fenofibrate (TRICOR) 145 mg tablet; Take 1 tablet (145 mg total) by mouth daily  -     Lipid panel; Future  6. Closed trimalleolar fracture of right ankle, initial encounter  7. Hyperparathyroidism (HCC)  8. Obesity, morbid (HCC)  9. Lack of physical activity         History of Present Illness     Labs, refill.  Wakes up tired.  Anxiety, depression controlled.  BP controlled.      Review of Systems   Constitutional:  Positive for fatigue.   HENT: Negative.     Eyes: Negative.    Respiratory: Negative.     Cardiovascular: Negative.    Gastrointestinal: Negative.    Genitourinary: Negative.    Musculoskeletal: Negative.    Skin: Negative.    Neurological: Negative.    Psychiatric/Behavioral: Negative.       Past Medical History:   Diagnosis Date    Cat-scratch disease     Cellulitis     Erysipelas     last assessed-10/14/2013    Parathyroid adenoma     Rosacea     Thrombocytopenia (HCC)     last assessed-1/16/2017    Tick bites     last assessed-10/14/2013     Past Surgical History:   Procedure Laterality Date    APPENDECTOMY      1998    LYMPHADENECTOMY      PARATHYROIDECTOMY      NE OPEN TX TRIMALLEOLAR ANKLE FX W/O FIXJ PST LIP Right 5/17/2021    Procedure: OPEN REDUCTION W/ INTERNAL FIXATION (ORIF) ANKLE;  Surgeon: James R Lachman, MD;  Location:  MAIN OR;  Service: Orthopedics    TOOTH EXTRACTION       Family History   Problem Relation Age of Onset    Aortic aneurysm Mother     Hypertension Mother     Heart attack Father         acute MI    Breast cancer Paternal Grandmother     Breast cancer Paternal Aunt     Diabetes Paternal Uncle      Social History     Tobacco Use    Smoking status: Former    Smokeless tobacco: Never   Vaping Use    Vaping status: Every Day    Start date: 2/12/2018    Substances: Nicotine   Substance and Sexual Activity    Alcohol use:  No    Drug use: Never    Sexual activity: Not on file     Current Outpatient Medications on File Prior to Visit   Medication Sig    Ascorbic Acid (VITAMIN C) 100 MG CHEW Chew 1 tablet daily    calcium citrate-vitamin d 500-500 MG-UNIT CHEW chewable tablet Chew    Cholecalciferol (VITAMIN D3) 1000 units CAPS Take by mouth    CRANBERRY PO Take by mouth    econazole nitrate 1 % cream Apply topically daily    famotidine (PEPCID) 40 MG tablet TAKE ONE TABLET BY MOUTH EVERY DAY    hydrocortisone 2.5 % cream Apply topically    ketoconazole (NIZORAL) 2 % cream APPLY TO SKIN TWICE DAILY AS DIRECTED    LORazepam (ATIVAN) 1 mg tablet Take 1 tablet (1 mg total) by mouth 3 (three) times a day    Magnesium 250 MG TABS Take by mouth    Multiple Vitamin (MULTIVITAMINS PO) Take 1 tablet by mouth daily    potassium chloride (Klor-Con) 10 mEq tablet TAKE ONE TABLET BY MOUTH EVERY DAY    ILEVRO 0.3 % SUSP  (Patient not taking: Reported on 6/5/2024)    methylPREDNISolone 4 MG tablet therapy pack Use as directed on package (Patient not taking: Reported on 6/5/2024)    metroNIDAZOLE (METROGEL) 0.75 % gel APPLY TO SKIN EVERY DAY (Patient not taking: Reported on 6/5/2024)    metroNIDAZOLE (METROGEL) 1 % gel Apply topically (Patient not taking: Reported on 6/5/2024)    ondansetron (ZOFRAN) 4 mg tablet Take 1 tablet (4 mg total) by mouth every 8 (eight) hours as needed for nausea or vomiting (Patient not taking: Reported on 6/5/2024)     Allergies   Allergen Reactions    Morphine Hallucinations     Immunization History   Administered Date(s) Administered    COVID-19 PFIZER VACCINE 0.3 ML IM 04/30/2021, 05/21/2021, 11/27/2021    COVID-19 Pfizer vac (Ronald-sucrose, gray cap) 12 yr+ IM 04/30/2022    H1N1, All Formulations 01/09/2010    INFLUENZA 10/22/2007, 10/17/2008, 10/16/2009, 10/22/2010, 09/16/2016, 09/30/2018, 09/05/2020, 10/10/2021    Influenza Quadrivalent, 6-35 Months IM 09/16/2016    Influenza Split High Dose Preservative Free IM  "10/26/2015, 10/09/2016, 09/30/2018, 09/24/2019    Pneumococcal Polysaccharide PPV23 10/22/2007, 12/23/2013, 09/30/2018    TD (adult) Preservative Free 08/14/2018    Td (adult), adsorbed 06/09/2008, 08/14/2018    Zoster 12/01/2009, 08/03/2017     Objective     /70 (BP Location: Left arm, Patient Position: Sitting, Cuff Size: Standard)   Pulse 75   Temp (!) 97.3 °F (36.3 °C) (Temporal)   Ht 5' 5\" (1.651 m)   Wt 99.3 kg (219 lb)   SpO2 98%   BMI 36.44 kg/m²     Physical Exam  Constitutional:       Appearance: He is well-developed.   HENT:      Head: Normocephalic and atraumatic.      Right Ear: Tympanic membrane, ear canal and external ear normal.      Left Ear: Tympanic membrane, ear canal and external ear normal.      Nose: Nose normal.      Mouth/Throat:      Pharynx: Oropharynx is clear.   Eyes:      Conjunctiva/sclera: Conjunctivae normal.      Pupils: Pupils are equal, round, and reactive to light.   Cardiovascular:      Rate and Rhythm: Normal rate and regular rhythm.      Heart sounds: Normal heart sounds.   Pulmonary:      Effort: Pulmonary effort is normal.      Breath sounds: Normal breath sounds.   Abdominal:      General: Abdomen is flat. Bowel sounds are normal.      Palpations: Abdomen is soft.   Musculoskeletal:      Cervical back: Normal range of motion and neck supple.   Skin:     General: Skin is warm and dry.   Neurological:      Mental Status: He is alert and oriented to person, place, and time.      Deep Tendon Reflexes: Reflexes are normal and symmetric.   Psychiatric:         Mood and Affect: Mood normal.         Behavior: Behavior normal.         Thought Content: Thought content normal.         Judgment: Judgment normal.       Administrative Statements         "

## 2024-06-07 LAB
CHOLEST SERPL-MCNC: 164 MG/DL
CHOLEST/HDLC SERPL: 4.4 (CALC)
HDLC SERPL-MCNC: 37 MG/DL
LDLC SERPL CALC-MCNC: 88 MG/DL (CALC)
NONHDLC SERPL-MCNC: 127 MG/DL (CALC)
TRIGL SERPL-MCNC: 327 MG/DL

## 2024-06-29 DIAGNOSIS — F41.9 ANXIETY: ICD-10-CM

## 2024-07-01 RX ORDER — LORAZEPAM 1 MG/1
1 TABLET ORAL 3 TIMES DAILY
Qty: 90 TABLET | Refills: 5 | Status: SHIPPED | OUTPATIENT
Start: 2024-07-01

## 2024-07-24 DIAGNOSIS — I10 ESSENTIAL HYPERTENSION: ICD-10-CM

## 2024-07-25 RX ORDER — LOSARTAN POTASSIUM 100 MG/1
100 TABLET ORAL DAILY
Qty: 100 TABLET | Refills: 1 | Status: SHIPPED | OUTPATIENT
Start: 2024-07-25

## 2024-11-25 ENCOUNTER — RA CDI HCC (OUTPATIENT)
Dept: OTHER | Facility: HOSPITAL | Age: 74
End: 2024-11-25

## 2024-12-03 ENCOUNTER — OFFICE VISIT (OUTPATIENT)
Dept: FAMILY MEDICINE CLINIC | Facility: CLINIC | Age: 74
End: 2024-12-03
Payer: COMMERCIAL

## 2024-12-03 VITALS
HEIGHT: 65 IN | RESPIRATION RATE: 18 BRPM | BODY MASS INDEX: 37.59 KG/M2 | DIASTOLIC BLOOD PRESSURE: 74 MMHG | TEMPERATURE: 97.7 F | HEART RATE: 78 BPM | OXYGEN SATURATION: 97 % | WEIGHT: 225.6 LBS | SYSTOLIC BLOOD PRESSURE: 132 MMHG

## 2024-12-03 DIAGNOSIS — F41.9 ANXIETY: ICD-10-CM

## 2024-12-03 DIAGNOSIS — R12 HEART BURN: ICD-10-CM

## 2024-12-03 DIAGNOSIS — Z00.00 MEDICARE ANNUAL WELLNESS VISIT, INITIAL: Primary | ICD-10-CM

## 2024-12-03 DIAGNOSIS — Z12.5 PROSTATE CANCER SCREENING: ICD-10-CM

## 2024-12-03 DIAGNOSIS — I10 ESSENTIAL HYPERTENSION: ICD-10-CM

## 2024-12-03 DIAGNOSIS — E55.9 VITAMIN D INSUFFICIENCY: ICD-10-CM

## 2024-12-03 DIAGNOSIS — E83.52 HYPERCALCEMIA: ICD-10-CM

## 2024-12-03 DIAGNOSIS — F43.12 CHRONIC POST-TRAUMATIC STRESS DISORDER (PTSD): ICD-10-CM

## 2024-12-03 DIAGNOSIS — Z72.3 LACK OF PHYSICAL ACTIVITY: ICD-10-CM

## 2024-12-03 DIAGNOSIS — F33.0 MILD EPISODE OF RECURRENT MAJOR DEPRESSIVE DISORDER (HCC): ICD-10-CM

## 2024-12-03 DIAGNOSIS — E78.1 HYPERTRIGLYCERIDEMIA: ICD-10-CM

## 2024-12-03 PROCEDURE — G0439 PPPS, SUBSEQ VISIT: HCPCS | Performed by: FAMILY MEDICINE

## 2024-12-03 PROCEDURE — 99214 OFFICE O/P EST MOD 30 MIN: CPT | Performed by: FAMILY MEDICINE

## 2024-12-03 RX ORDER — ALPRAZOLAM 0.25 MG/1
0.25 TABLET ORAL 4 TIMES DAILY
Qty: 120 TABLET | Refills: 5 | Status: SHIPPED | OUTPATIENT
Start: 2024-12-03 | End: 2025-06-01

## 2024-12-03 RX ORDER — LORAZEPAM 1 MG/1
1 TABLET ORAL 3 TIMES DAILY
Qty: 90 TABLET | Refills: 5 | Status: SHIPPED | OUTPATIENT
Start: 2024-12-03

## 2024-12-03 RX ORDER — POTASSIUM CHLORIDE 750 MG/1
10 TABLET, EXTENDED RELEASE ORAL DAILY
Qty: 90 TABLET | Refills: 3 | Status: SHIPPED | OUTPATIENT
Start: 2024-12-03

## 2024-12-03 RX ORDER — FENOFIBRATE 145 MG/1
145 TABLET, COATED ORAL DAILY
Qty: 90 TABLET | Refills: 1 | Status: SHIPPED | OUTPATIENT
Start: 2024-12-03

## 2024-12-03 RX ORDER — FAMOTIDINE 40 MG/1
40 TABLET, FILM COATED ORAL DAILY
Qty: 90 TABLET | Refills: 3 | Status: SHIPPED | OUTPATIENT
Start: 2024-12-03

## 2024-12-03 RX ORDER — LOSARTAN POTASSIUM 100 MG/1
100 TABLET ORAL DAILY
Qty: 100 TABLET | Refills: 1 | Status: SHIPPED | OUTPATIENT
Start: 2024-12-03

## 2024-12-03 NOTE — ASSESSMENT & PLAN NOTE
Orders:    losartan (COZAAR) 100 MG tablet; Take 1 tablet (100 mg total) by mouth daily    CBC and Platelet; Future    Basic metabolic panel; Future    Hepatic function panel; Future    Lipid panel; Future

## 2024-12-03 NOTE — PATIENT INSTRUCTIONS
Medicare Preventive Visit Patient Instructions  Thank you for completing your Welcome to Medicare Visit or Medicare Annual Wellness Visit today. Your next wellness visit will be due in one year (12/4/2025).  The screening/preventive services that you may require over the next 5-10 years are detailed below. Some tests may not apply to you based off risk factors and/or age. Screening tests ordered at today's visit but not completed yet may show as past due. Also, please note that scanned in results may not display below.  Preventive Screenings:  Service Recommendations Previous Testing/Comments   Colorectal Cancer Screening  Colonoscopy    Fecal Occult Blood Test (FOBT)/Fecal Immunochemical Test (FIT)  Fecal DNA/Cologuard Test  Flexible Sigmoidoscopy Age: 45-75 years old   Colonoscopy: every 10 years (May be performed more frequently if at higher risk)  OR  FOBT/FIT: every 1 year  OR  Cologuard: every 3 years  OR  Sigmoidoscopy: every 5 years  Screening may be recommended earlier than age 45 if at higher risk for colorectal cancer. Also, an individualized decision between you and your healthcare provider will decide whether screening between the ages of 76-85 would be appropriate. Colonoscopy: 07/01/2009  FOBT/FIT: Not on file  Cologuard: 07/20/2023  Sigmoidoscopy: Not on file    Screening Current     Prostate Cancer Screening Individualized decision between patient and health care provider in men between ages of 55-69   Medicare will cover every 12 months beginning on the day after your 50th birthday PSA: 0.83 ng/mL     Screening Not Indicated     Hepatitis C Screening Once for adults born between 1945 and 1965  More frequently in patients at high risk for Hepatitis C Hep C Antibody: 04/22/2020    Screening Current   Diabetes Screening 1-2 times per year if you're at risk for diabetes or have pre-diabetes Fasting glucose: No results in last 5 years (No results in last 5 years)  A1C: No results in last 5 years (No  results in last 5 years)  Screening Current   Cholesterol Screening Once every 5 years if you don't have a lipid disorder. May order more often based on risk factors. Lipid panel: 06/06/2024  Screening Not Indicated  History Lipid Disorder      Other Preventive Screenings Covered by Medicare:  Abdominal Aortic Aneurysm (AAA) Screening: covered once if your at risk. You're considered to be at risk if you have a family history of AAA or a male between the age of 65-75 who smoking at least 100 cigarettes in your lifetime.  Lung Cancer Screening: covers low dose CT scan once per year if you meet all of the following conditions: (1) Age 55-77; (2) No signs or symptoms of lung cancer; (3) Current smoker or have quit smoking within the last 15 years; (4) You have a tobacco smoking history of at least 20 pack years (packs per day x number of years you smoked); (5) You get a written order from a healthcare provider.  Glaucoma Screening: covered annually if you're considered high risk: (1) You have diabetes OR (2) Family history of glaucoma OR (3)  aged 50 and older OR (4)  American aged 65 and older  Osteoporosis Screening: covered every 2 years if you meet one of the following conditions: (1) Have a vertebral abnormality; (2) On glucocorticoid therapy for more than 3 months; (3) Have primary hyperparathyroidism; (4) On osteoporosis medications and need to assess response to drug therapy.  HIV Screening: covered annually if you're between the age of 15-65. Also covered annually if you are younger than 15 and older than 65 with risk factors for HIV infection. For pregnant patients, it is covered up to 3 times per pregnancy.    Immunizations:  Immunization Recommendations   Influenza Vaccine Annual influenza vaccination during flu season is recommended for all persons aged >= 6 months who do not have contraindications   Pneumococcal Vaccine   * Pneumococcal conjugate vaccine = PCV13 (Prevnar 13), PCV15  (Vaxneuvance), PCV20 (Prevnar 20)  * Pneumococcal polysaccharide vaccine = PPSV23 (Pneumovax) Adults 19-65 yo with certain risk factors or if 65+ yo  If never received any pneumonia vaccine: recommend Prevnar 20 (PCV20)  Give PCV20 if previously received 1 dose of PCV13 or PPSV23   Hepatitis B Vaccine 3 dose series if at intermediate or high risk (ex: diabetes, end stage renal disease, liver disease)   Respiratory syncytial virus (RSV) Vaccine - COVERED BY MEDICARE PART D  * RSVPreF3 (Arexvy) CDC recommends that adults 60 years of age and older may receive a single dose of RSV vaccine using shared clinical decision-making (SCDM)   Tetanus (Td) Vaccine - COST NOT COVERED BY MEDICARE PART B Following completion of primary series, a booster dose should be given every 10 years to maintain immunity against tetanus. Td may also be given as tetanus wound prophylaxis.   Tdap Vaccine - COST NOT COVERED BY MEDICARE PART B Recommended at least once for all adults. For pregnant patients, recommended with each pregnancy.   Shingles Vaccine (Shingrix) - COST NOT COVERED BY MEDICARE PART B  2 shot series recommended in those 19 years and older who have or will have weakened immune systems or those 50 years and older     Health Maintenance Due:      Topic Date Due   • Hepatitis C Screening  Completed   • Colorectal Cancer Screening  Discontinued     Immunizations Due:      Topic Date Due   • Pneumococcal Vaccine: 65+ Years (2 of 2 - PCV) 09/30/2019   • Influenza Vaccine (1) 09/01/2024   • COVID-19 Vaccine (5 - 2024-25 season) 09/01/2024     Advance Directives   What are advance directives?  Advance directives are legal documents that state your wishes and plans for medical care. These plans are made ahead of time in case you lose your ability to make decisions for yourself. Advance directives can apply to any medical decision, such as the treatments you want, and if you want to donate organs.   What are the types of advance  directives?  There are many types of advance directives, and each state has rules about how to use them. You may choose a combination of any of the following:  Living will:  This is a written record of the treatment you want. You can also choose which treatments you do not want, which to limit, and which to stop at a certain time. This includes surgery, medicine, IV fluid, and tube feedings.   Durable power of  for healthcare (DPAHC):  This is a written record that states who you want to make healthcare choices for you when you are unable to make them for yourself. This person, called a proxy, is usually a family member or a friend. You may choose more than 1 proxy.  Do not resuscitate (DNR) order:  A DNR order is used in case your heart stops beating or you stop breathing. It is a request not to have certain forms of treatment, such as CPR. A DNR order may be included in other types of advance directives.  Medical directive:  This covers the care that you want if you are in a coma, near death, or unable to make decisions for yourself. You can list the treatments you want for each condition. Treatment may include pain medicine, surgery, blood transfusions, dialysis, IV or tube feedings, and a ventilator (breathing machine).  Values history:  This document has questions about your views, beliefs, and how you feel and think about life. This information can help others choose the care that you would choose.  Why are advance directives important?  An advance directive helps you control your care. Although spoken wishes may be used, it is better to have your wishes written down. Spoken wishes can be misunderstood, or not followed. Treatments may be given even if you do not want them. An advance directive may make it easier for your family to make difficult choices about your care.   Weight Management   Why it is important to manage your weight:  Being overweight increases your risk of health conditions such as  heart disease, high blood pressure, type 2 diabetes, and certain types of cancer. It can also increase your risk for osteoarthritis, sleep apnea, and other respiratory problems. Aim for a slow, steady weight loss. Even a small amount of weight loss can lower your risk of health problems.  How to lose weight safely:  A safe and healthy way to lose weight is to eat fewer calories and get regular exercise. You can lose up about 1 pound a week by decreasing the number of calories you eat by 500 calories each day.   Healthy meal plan for weight management:  A healthy meal plan includes a variety of foods, contains fewer calories, and helps you stay healthy. A healthy meal plan includes the following:  Eat whole-grain foods more often.  A healthy meal plan should contain fiber. Fiber is the part of grains, fruits, and vegetables that is not broken down by your body. Whole-grain foods are healthy and provide extra fiber in your diet. Some examples of whole-grain foods are whole-wheat breads and pastas, oatmeal, brown rice, and bulgur.  Eat a variety of vegetables every day.  Include dark, leafy greens such as spinach, kale, gamaliel greens, and mustard greens. Eat yellow and orange vegetables such as carrots, sweet potatoes, and winter squash.   Eat a variety of fruits every day.  Choose fresh or canned fruit (canned in its own juice or light syrup) instead of juice. Fruit juice has very little or no fiber.  Eat low-fat dairy foods.  Drink fat-free (skim) milk or 1% milk. Eat fat-free yogurt and low-fat cottage cheese. Try low-fat cheeses such as mozzarella and other reduced-fat cheeses.  Choose meat and other protein foods that are low in fat.  Choose beans or other legumes such as split peas or lentils. Choose fish, skinless poultry (chicken or turkey), or lean cuts of red meat (beef or pork). Before you cook meat or poultry, cut off any visible fat.   Use less fat and oil.  Try baking foods instead of frying them. Add  less fat, such as margarine, sour cream, regular salad dressing and mayonnaise to foods. Eat fewer high-fat foods. Some examples of high-fat foods include french fries, doughnuts, ice cream, and cakes.  Eat fewer sweets.  Limit foods and drinks that are high in sugar. This includes candy, cookies, regular soda, and sweetened drinks.  Exercise:  Exercise at least 30 minutes per day on most days of the week. Some examples of exercise include walking, biking, dancing, and swimming. You can also fit in more physical activity by taking the stairs instead of the elevator or parking farther away from stores. Ask your healthcare provider about the best exercise plan for you.      © Copyright [employer] 2018 Information is for End User's use only and may not be sold, redistributed or otherwise used for commercial purposes. All illustrations and images included in CareNotes® are the copyrighted property of [employer]., Inc[employer] or [employer]

## 2024-12-03 NOTE — PROGRESS NOTES
Name: Mao Gonsales Jr.      : 1950      MRN: [id number]  Encounter Provider: Sekou Hackett DO  Encounter Date: 12/3/2024   Encounter department: [facility]    Assessment & Plan  Medicare annual wellness visit, initial         Anxiety    Orders:    ALPRAZolam (XANAX) 0.25 mg tablet; Take 1 tablet (0.25 mg total) by mouth 4 (four) times a day    LORazepam (ATIVAN) 1 mg tablet; Take 1 tablet (1 mg total) by mouth 3 (three) times a day    Heart burn    Orders:    famotidine (PEPCID) 40 MG tablet; Take 1 tablet (40 mg total) by mouth daily    Hypertriglyceridemia    Orders:    fenofibrate (TRICOR) 145 mg tablet; Take 1 tablet (145 mg total) by mouth daily    Essential hypertension    Orders:    losartan (COZAAR) 100 MG tablet; Take 1 tablet (100 mg total) by mouth daily    CBC and Platelet; Future    Basic metabolic panel; Future    Hepatic function panel; Future    Lipid panel; Future    Hypercalcemia    Orders:    potassium chloride (Klor-Con) 10 mEq tablet; Take 1 tablet (10 mEq total) by mouth daily    Vitamin D insufficiency    Orders:    Vitamin D 25 hydroxy; Future    Prostate cancer screening    Orders:    PSA, Total Screen; Future    Mild episode of recurrent major depressive disorder (HCC)           Chronic post-traumatic stress disorder (PTSD)         Lack of physical activity            Preventive health issues were discussed with patient, and age appropriate screening tests were ordered as noted in patient's After Visit Summary. Personalized health advice and appropriate referrals for health education or preventive services given if needed, as noted in patient's After Visit Summary.    History of Present Illness     Medicare PE, labs, refill.  Anxiety, depression controlled.  Reviewed labs from May, continues with low platelet count.  Has not been exercising - swimming.  Chart reviewed prior to visit.    Hypertension       Patient Care Team:  Sekou Hackett DO as PCP -  General Sekou Hackett DO    Review of Systems   Constitutional: Negative.    HENT: Negative.     Eyes: Negative.    Respiratory: Negative.     Cardiovascular: Negative.    Gastrointestinal: Negative.    Genitourinary: Negative.    Musculoskeletal: Negative.    Skin: Negative.    Neurological: Negative.    Psychiatric/Behavioral: Negative.       Medical History Reviewed by provider this encounter:       Annual Wellness Visit Questionnaire   Mao is here for his Initial Wellness visit.     Health Risk Assessment:   Patient rates overall health as good. Patient feels that their physical health rating is same. Patient is satisfied with their life. Eyesight was rated as same. Hearing was rated as same. Patient feels that their emotional and mental health rating is same. Patients states they are never, rarely angry. Patient states they are always unusually tired/fatigued. Pain experienced in the last 7 days has been none. Patient states that he has experienced no weight loss or gain in last 6 months.     Depression Screening:   PHQ-9 Score: 0      Fall Risk Screening:   In the past year, patient has experienced: no history of falling in past year      Home Safety:  Patient does not have trouble with stairs inside or outside of their home. Patient has working smoke alarms and has working carbon monoxide detector. Home safety hazards include: none.     Nutrition:   Current diet is Regular.     Medications:   Patient is currently taking over-the-counter supplements. OTC medications include: see medication list. Patient is able to manage medications.     Activities of Daily Living (ADLs)/Instrumental Activities of Daily Living (IADLs):   Walk and transfer into and out of bed and chair?: Yes  Dress and groom yourself?: Yes    Bathe or shower yourself?: Yes    Feed yourself? Yes  Do your laundry/housekeeping?: Yes  Manage your money, pay your bills and track your expenses?: Yes  Make your own meals?: Yes    Do your own  shopping?: Yes    Previous Hospitalizations:   Any hospitalizations or ED visits within the last 12 months?: No      Advance Care Planning:   Living will: Yes    Durable POA for healthcare: Yes    Advanced directive: Yes      Cognitive Screening:   Provider or family/friend/caregiver concerned regarding cognition?: No    PREVENTIVE SCREENINGS      Cardiovascular Screening:    General: Screening Not Indicated and History Lipid Disorder      Diabetes Screening:     General: Screening Current      Colorectal Cancer Screening:     General: Screening Current      Prostate Cancer Screening:    General: Screening Not Indicated      Osteoporosis Screening:    General: Screening Not Indicated      Abdominal Aortic Aneurysm (AAA) Screening:    Risk factors include: tobacco use        General: Screening Not Indicated      Lung Cancer Screening:     General: Screening Not Indicated      Hepatitis C Screening:    General: Screening Current    Screening, Brief Intervention, and Referral to Treatment (SBIRT)    Screening  Typical number of drinks in a day: 0  Typical number of drinks in a week: 0  Interpretation: Low risk drinking behavior.    AUDIT-C Screenin) How often did you have a drink containing alcohol in the past year? never  2) How many drinks did you have on a typical day when you were drinking in the past year? 0  3) How often did you have 6 or more drinks on one occasion in the past year? never    AUDIT-C Score: 0  Interpretation: Score 0-3 (male): Negative screen for alcohol misuse    Single Item Drug Screening:  How often have you used an illegal drug (including marijuana) or a prescription medication for non-medical reasons in the past year? never    Single Item Drug Screen Score: 0  Interpretation: Negative screen for possible drug use disorder    Other Counseling Topics:   Car/seat belt/driving safety, skin self-exam, sunscreen and calcium and vitamin D intake and regular weightbearing exercise.     Social  "Drivers of Health     Financial Resource Strain: Low Risk  (8/16/2023)    Overall Financial Resource Strain (CARDIA)     Difficulty of Paying Living Expenses: Not hard at all   Food Insecurity: No Food Insecurity (12/3/2024)    Hunger Vital Sign     Worried About Running Out of Food in the Last Year: Never true     Ran Out of Food in the Last Year: Never true   Transportation Needs: No Transportation Needs (12/3/2024)    PRAPARE - Transportation     Lack of Transportation (Medical): No     Lack of Transportation (Non-Medical): No   Housing Stability: Unknown (12/3/2024)    Housing Stability Vital Sign     Unable to Pay for Housing in the Last Year: No     Homeless in the Last Year: No   Utilities: Not At Risk (12/3/2024)    [facility] Utilities     Threatened with loss of utilities: No     No results found.    Objective   /74 (BP Location: Left arm, Patient Position: Sitting, Cuff Size: Standard)   Pulse 78   Temp 97.7 °F (36.5 °C) (Temporal)   Resp 18   Ht 5' 5\" (1.651 m)   Wt 102 kg (225 lb 9.6 oz)   SpO2 97%   BMI 37.54 kg/m²     Physical Exam  Constitutional:       Appearance: He is well-developed.   HENT:      Head: Normocephalic and atraumatic.      Right Ear: External ear normal.      Left Ear: External ear normal.      Nose: Nose normal.   Eyes:      Conjunctiva/sclera: Conjunctivae normal.      Pupils: Pupils are equal, round, and reactive to light.   Cardiovascular:      Rate and Rhythm: Normal rate and regular rhythm.      Heart sounds: Normal heart sounds.   Pulmonary:      Effort: Pulmonary effort is normal.      Breath sounds: Normal breath sounds.   Abdominal:      General: Bowel sounds are normal.      Palpations: Abdomen is soft.   Musculoskeletal:      Cervical back: Normal range of motion and neck supple.   Skin:     General: Skin is warm and dry.   Neurological:      Mental Status: He is alert and oriented to person, place, and time.      Deep Tendon Reflexes: Reflexes are normal and " symmetric.   Psychiatric:         Behavior: Behavior normal.         Thought Content: Thought content normal.         Judgment: Judgment normal.     I have spent a total time of 38 minutes in caring for this patient on the day of the visit/encounter including Diagnostic results, Risks and benefits of tx options, Instructions for management, Patient and family education, Importance of tx compliance, Risk factor reductions, and Impressions.

## 2024-12-03 NOTE — ASSESSMENT & PLAN NOTE
Orders:    ALPRAZolam (XANAX) 0.25 mg tablet; Take 1 tablet (0.25 mg total) by mouth 4 (four) times a day    LORazepam (ATIVAN) 1 mg tablet; Take 1 tablet (1 mg total) by mouth 3 (three) times a day

## 2024-12-26 DIAGNOSIS — F41.9 ANXIETY: ICD-10-CM

## 2024-12-27 RX ORDER — ALPRAZOLAM 0.25 MG/1
0.25 TABLET ORAL 4 TIMES DAILY
Qty: 120 TABLET | Refills: 0 | Status: SHIPPED | OUTPATIENT
Start: 2024-12-27

## 2025-01-25 DIAGNOSIS — F41.9 ANXIETY: ICD-10-CM

## 2025-01-27 RX ORDER — ALPRAZOLAM 0.25 MG/1
0.25 TABLET ORAL 4 TIMES DAILY
Qty: 120 TABLET | Refills: 5 | Status: SHIPPED | OUTPATIENT
Start: 2025-01-27

## 2025-04-27 ENCOUNTER — RA CDI HCC (OUTPATIENT)
Dept: OTHER | Facility: HOSPITAL | Age: 75
End: 2025-04-27

## 2025-05-05 ENCOUNTER — OFFICE VISIT (OUTPATIENT)
Dept: FAMILY MEDICINE CLINIC | Facility: CLINIC | Age: 75
End: 2025-05-05
Payer: COMMERCIAL

## 2025-05-05 VITALS
OXYGEN SATURATION: 98 % | TEMPERATURE: 98.2 F | HEIGHT: 65 IN | SYSTOLIC BLOOD PRESSURE: 128 MMHG | BODY MASS INDEX: 37.75 KG/M2 | RESPIRATION RATE: 18 BRPM | DIASTOLIC BLOOD PRESSURE: 70 MMHG | WEIGHT: 226.6 LBS | HEART RATE: 74 BPM

## 2025-05-05 DIAGNOSIS — E78.1 HYPERTRIGLYCERIDEMIA: ICD-10-CM

## 2025-05-05 DIAGNOSIS — Z72.3 LACK OF PHYSICAL ACTIVITY: Primary | ICD-10-CM

## 2025-05-05 DIAGNOSIS — I10 ESSENTIAL HYPERTENSION: ICD-10-CM

## 2025-05-05 DIAGNOSIS — F32.0 CURRENT MILD EPISODE OF MAJOR DEPRESSIVE DISORDER WITHOUT PRIOR EPISODE (HCC): ICD-10-CM

## 2025-05-05 DIAGNOSIS — F41.9 ANXIETY: ICD-10-CM

## 2025-05-05 DIAGNOSIS — E66.01 OBESITY, MORBID (HCC): ICD-10-CM

## 2025-05-05 PROCEDURE — G2211 COMPLEX E/M VISIT ADD ON: HCPCS | Performed by: FAMILY MEDICINE

## 2025-05-05 PROCEDURE — 99215 OFFICE O/P EST HI 40 MIN: CPT | Performed by: FAMILY MEDICINE

## 2025-05-05 RX ORDER — LOSARTAN POTASSIUM 100 MG/1
100 TABLET ORAL DAILY
Qty: 100 TABLET | Refills: 1 | Status: SHIPPED | OUTPATIENT
Start: 2025-05-05

## 2025-05-05 RX ORDER — ALPRAZOLAM 0.25 MG
0.25 TABLET ORAL 4 TIMES DAILY
Qty: 120 TABLET | Refills: 5 | Status: SHIPPED | OUTPATIENT
Start: 2025-05-05

## 2025-05-05 RX ORDER — HYDROCHLOROTHIAZIDE 25 MG/1
25 TABLET ORAL DAILY
Qty: 90 TABLET | Refills: 3 | Status: SHIPPED | OUTPATIENT
Start: 2025-05-05

## 2025-05-05 RX ORDER — ESCITALOPRAM OXALATE 10 MG/1
10 TABLET ORAL DAILY
Qty: 90 TABLET | Refills: 3 | Status: SHIPPED | OUTPATIENT
Start: 2025-05-05 | End: 2026-04-30

## 2025-05-05 RX ORDER — FENOFIBRATE 145 MG/1
145 TABLET, FILM COATED ORAL DAILY
Qty: 90 TABLET | Refills: 1 | Status: SHIPPED | OUTPATIENT
Start: 2025-05-05

## 2025-05-05 RX ORDER — ATENOLOL 100 MG/1
100 TABLET ORAL 2 TIMES DAILY
Qty: 180 TABLET | Refills: 3 | Status: SHIPPED | OUTPATIENT
Start: 2025-05-05

## 2025-05-05 RX ORDER — LORAZEPAM 1 MG/1
1 TABLET ORAL 3 TIMES DAILY
Qty: 90 TABLET | Refills: 5 | Status: SHIPPED | OUTPATIENT
Start: 2025-05-05

## 2025-05-05 NOTE — ASSESSMENT & PLAN NOTE
I have spent a total time of 43 minutes in caring for this patient on the day of the visit/encounter including Diagnostic results, Risks and benefits of tx options, Instructions for management, Patient and family education, Importance of tx compliance, Risk factor reductions, and Impressions.

## 2025-05-05 NOTE — PROGRESS NOTES
Name: Mao Gonsales Jr.      : 1950      MRN: [id number]  Encounter Provider: Sekou Hackett DO  Encounter Date: 2025   Encounter department: [facility]  :  Assessment & Plan  Anxiety    Orders:    ALPRAZolam (XANAX) 0.25 mg tablet; Take 1 tablet (0.25 mg total) by mouth 4 (four) times a day    LORazepam (ATIVAN) 1 mg tablet; Take 1 tablet (1 mg total) by mouth 3 (three) times a day    Essential hypertension    Orders:    atenolol (TENORMIN) 100 mg tablet; Take 1 tablet (100 mg total) by mouth 2 (two) times a day    hydroCHLOROthiazide 25 mg tablet; Take 1 tablet (25 mg total) by mouth daily    losartan (COZAAR) 100 MG tablet; Take 1 tablet (100 mg total) by mouth daily    Current mild episode of major depressive disorder without prior episode (HCC)      Orders:    escitalopram (LEXAPRO) 10 mg tablet; Take 1 tablet (10 mg total) by mouth daily    Hypertriglyceridemia    Orders:    fenofibrate (TRICOR) 145 mg tablet; Take 1 tablet (145 mg total) by mouth daily    Obesity, morbid (HCC)         I have spent a total time of 43 minutes in caring for this patient on the day of the visit/encounter including Diagnostic results, Risks and benefits of tx options, Instructions for management, Patient and family education, Importance of tx compliance, Risk factor reductions, and Impressions.    Lack of physical activity           Chief Complaint   Patient presents with    Hypertension    Anxiety         History of Present Illness   Labs, refills.  Lacking physical activity, stopped swimming.  Sits most of the day.      Review of Systems   Constitutional: Negative.    HENT: Negative.     Eyes: Negative.    Respiratory: Negative.     Cardiovascular: Negative.    Gastrointestinal: Negative.    Genitourinary: Negative.    Musculoskeletal: Negative.    Skin: Negative.    Neurological: Negative.    Psychiatric/Behavioral: Negative.         Objective   /70 (BP Location: Left arm, Patient Position:  "Sitting, Cuff Size: Adult)   Pulse 74   Temp 98.2 °F (36.8 °C) (Temporal)   Resp 18   Ht 5' 5\" (1.651 m)   Wt 103 kg (226 lb 9.6 oz)   SpO2 98%   BMI 37.71 kg/m²    BMI Counseling: Body mass index is 37.71 kg/m². The BMI is above normal. Nutrition recommendations include reducing portion sizes, consuming healthier snacks, moderation in carbohydrate intake, and increasing intake of lean protein.  Physical Exam  Constitutional:       Appearance: He is well-developed.   HENT:      Head: Normocephalic and atraumatic.      Right Ear: External ear normal.      Left Ear: External ear normal.      Nose: Nose normal.      Mouth/Throat:      Mouth: Mucous membranes are moist.      Pharynx: Oropharynx is clear.   Eyes:      Conjunctiva/sclera: Conjunctivae normal.      Pupils: Pupils are equal, round, and reactive to light.   Cardiovascular:      Rate and Rhythm: Normal rate and regular rhythm.      Heart sounds: Normal heart sounds.   Pulmonary:      Effort: Pulmonary effort is normal.      Breath sounds: Normal breath sounds.   Abdominal:      General: Bowel sounds are normal.      Palpations: Abdomen is soft.   Musculoskeletal:      Cervical back: Normal range of motion and neck supple.   Skin:     General: Skin is warm and dry.   Neurological:      Mental Status: He is alert and oriented to person, place, and time.      Deep Tendon Reflexes: Reflexes are normal and symmetric.   Psychiatric:         Behavior: Behavior normal.         Thought Content: Thought content normal.         Judgment: Judgment normal.         "

## 2025-05-05 NOTE — ASSESSMENT & PLAN NOTE
Orders:    atenolol (TENORMIN) 100 mg tablet; Take 1 tablet (100 mg total) by mouth 2 (two) times a day    hydroCHLOROthiazide 25 mg tablet; Take 1 tablet (25 mg total) by mouth daily    losartan (COZAAR) 100 MG tablet; Take 1 tablet (100 mg total) by mouth daily

## 2025-05-07 LAB
25(OH)D3 SERPL-MCNC: 50 NG/ML (ref 30–100)
ALBUMIN SERPL-MCNC: 4.7 G/DL (ref 3.6–5.1)
ALBUMIN/GLOB SERPL: 2 (CALC) (ref 1–2.5)
ALP SERPL-CCNC: 35 U/L (ref 35–144)
ALT SERPL-CCNC: 32 U/L (ref 9–46)
AST SERPL-CCNC: 29 U/L (ref 10–35)
BASOPHILS # BLD AUTO: 32 CELLS/UL (ref 0–200)
BASOPHILS NFR BLD AUTO: 0.6 %
BILIRUB DIRECT SERPL-MCNC: 0.1 MG/DL
BILIRUB INDIRECT SERPL-MCNC: 0.7 MG/DL (CALC) (ref 0.2–1.2)
BILIRUB SERPL-MCNC: 0.8 MG/DL (ref 0.2–1.2)
BUN SERPL-MCNC: 25 MG/DL (ref 7–25)
BUN/CREAT SERPL: ABNORMAL (CALC) (ref 6–22)
CALCIUM SERPL-MCNC: 9.9 MG/DL (ref 8.6–10.3)
CHLORIDE SERPL-SCNC: 102 MMOL/L (ref 98–110)
CHOLEST SERPL-MCNC: 204 MG/DL
CHOLEST/HDLC SERPL: 5.2 (CALC)
CO2 SERPL-SCNC: 32 MMOL/L (ref 20–32)
CREAT SERPL-MCNC: 0.98 MG/DL (ref 0.7–1.28)
EOSINOPHIL # BLD AUTO: 130 CELLS/UL (ref 15–500)
EOSINOPHIL NFR BLD AUTO: 2.4 %
ERYTHROCYTE [DISTWIDTH] IN BLOOD BY AUTOMATED COUNT: 12.4 % (ref 11–15)
GFR/BSA.PRED SERPLBLD CYS-BASED-ARV: 80 ML/MIN/1.73M2
GLOBULIN SER CALC-MCNC: 2.3 G/DL (CALC) (ref 1.9–3.7)
GLUCOSE SERPL-MCNC: 106 MG/DL (ref 65–99)
HCT VFR BLD AUTO: 46.7 % (ref 38.5–50)
HDLC SERPL-MCNC: 39 MG/DL
HGB BLD-MCNC: 16.2 G/DL (ref 13.2–17.1)
LYMPHOCYTES # BLD AUTO: 1107 CELLS/UL (ref 850–3900)
LYMPHOCYTES NFR BLD AUTO: 20.5 %
MCH RBC QN AUTO: 32.7 PG (ref 27–33)
MCHC RBC AUTO-ENTMCNC: 34.7 G/DL (ref 32–36)
MCV RBC AUTO: 94.2 FL (ref 80–100)
MONOCYTES # BLD AUTO: 529 CELLS/UL (ref 200–950)
MONOCYTES NFR BLD AUTO: 9.8 %
NEUTROPHILS # BLD AUTO: 3602 CELLS/UL (ref 1500–7800)
NEUTROPHILS NFR BLD AUTO: 66.7 %
NONHDLC SERPL-MCNC: 165 MG/DL (CALC)
PLATELET # BLD AUTO: 134 THOUSAND/UL (ref 140–400)
PMV BLD REES-ECKER: 11.1 FL (ref 7.5–12.5)
POTASSIUM SERPL-SCNC: 3.9 MMOL/L (ref 3.5–5.3)
PROT SERPL-MCNC: 7 G/DL (ref 6.1–8.1)
PSA SERPL-MCNC: 0.78 NG/ML
RBC # BLD AUTO: 4.96 MILLION/UL (ref 4.2–5.8)
SODIUM SERPL-SCNC: 140 MMOL/L (ref 135–146)
TRIGL SERPL-MCNC: 416 MG/DL
WBC # BLD AUTO: 5.4 THOUSAND/UL (ref 3.8–10.8)

## 2025-07-16 ENCOUNTER — OFFICE VISIT (OUTPATIENT)
Dept: FAMILY MEDICINE CLINIC | Facility: CLINIC | Age: 75
End: 2025-07-16
Payer: COMMERCIAL

## 2025-07-16 VITALS
TEMPERATURE: 97.9 F | HEART RATE: 90 BPM | HEIGHT: 65 IN | WEIGHT: 228 LBS | SYSTOLIC BLOOD PRESSURE: 128 MMHG | OXYGEN SATURATION: 98 % | DIASTOLIC BLOOD PRESSURE: 78 MMHG | BODY MASS INDEX: 37.99 KG/M2 | RESPIRATION RATE: 18 BRPM

## 2025-07-16 DIAGNOSIS — J06.9 VIRAL UPPER RESPIRATORY TRACT INFECTION: Primary | ICD-10-CM

## 2025-07-16 PROCEDURE — G2211 COMPLEX E/M VISIT ADD ON: HCPCS | Performed by: FAMILY MEDICINE

## 2025-07-16 PROCEDURE — 99213 OFFICE O/P EST LOW 20 MIN: CPT | Performed by: FAMILY MEDICINE

## 2025-07-16 RX ORDER — METHYLPREDNISOLONE 4 MG/1
TABLET ORAL
Qty: 21 EACH | Refills: 1 | Status: SHIPPED | OUTPATIENT
Start: 2025-07-16

## 2025-07-16 NOTE — PROGRESS NOTES
"Name: Mao Gonsales Jr.      : 1950      MRN: [id number]  Encounter Provider: Sekou Hackett DO  Encounter Date: 2025   Encounter department: [facility]  :  Assessment & Plan  Viral upper respiratory tract infection    Orders:    methylPREDNISolone 4 MG tablet therapy pack; Use as directed on package      Chief Complaint   Patient presents with    Earache     Bilateral ear pain started Monday, now associated with sore throat, took acetaminophen and ibuprofen.         History of Present Illness   Throat getting sore, both ears bothering.  Had ears cleaned past Friday.  URI type symptoms.  Started swimming again.      Review of Systems   Constitutional: Negative.    HENT:  Positive for congestion. Negative for sore throat.         Mild congestion.   Eyes: Negative.    Respiratory: Negative.     Cardiovascular: Negative.    Gastrointestinal: Negative.    Genitourinary: Negative.    Musculoskeletal: Negative.    Skin: Negative.    Neurological: Negative.    Psychiatric/Behavioral: Negative.         Objective   /78 (BP Location: Left arm, Patient Position: Sitting, Cuff Size: Standard)   Pulse 90   Temp 97.9 °F (36.6 °C) (Temporal)   Resp 18   Ht 5' 5\" (1.651 m)   Wt 103 kg (228 lb)   SpO2 98%   BMI 37.94 kg/m²    BMI Counseling: Body mass index is 37.94 kg/m². The BMI is above normal. Nutrition recommendations include reducing portion sizes, consuming healthier snacks, and moderation in carbohydrate intake.  Physical Exam  Constitutional:       Appearance: He is well-developed.   HENT:      Head: Normocephalic and atraumatic.      Right Ear: Tympanic membrane, ear canal and external ear normal.      Left Ear: Tympanic membrane, ear canal and external ear normal.      Nose: Nose normal.      Mouth/Throat:      Mouth: Mucous membranes are moist.      Pharynx: Oropharynx is clear.     Eyes:      Conjunctiva/sclera: Conjunctivae normal.      Pupils: Pupils are equal, round, and " reactive to light.       Cardiovascular:      Rate and Rhythm: Normal rate and regular rhythm.      Heart sounds: Normal heart sounds.   Pulmonary:      Effort: Pulmonary effort is normal.      Breath sounds: Normal breath sounds.   Abdominal:      General: Bowel sounds are normal.     Musculoskeletal:      Cervical back: Normal range of motion and neck supple.     Skin:     General: Skin is warm and dry.     Neurological:      Mental Status: He is alert and oriented to person, place, and time.      Deep Tendon Reflexes: Reflexes are normal and symmetric.     Psychiatric:         Mood and Affect: Mood normal.         Behavior: Behavior normal.         Thought Content: Thought content normal.         Judgment: Judgment normal.

## (undated) DEVICE — PAD GROUNDING ADULT

## (undated) DEVICE — CAST PADDING 6 IN STERILE

## (undated) DEVICE — BETHLEHEM UNIV MAJ EXT ,KIT: Brand: CARDINAL HEALTH

## (undated) DEVICE — OVERDRILL AO, DIA3.5MM X 122MM: Brand: VARIAX

## (undated) DEVICE — PENCIL ELECTROSURG E-Z CLEAN -0035H

## (undated) DEVICE — DRILL: Brand: SONICFUSION

## (undated) DEVICE — SPLINT 5 X 30 IN XFAST SET PLASTER

## (undated) DEVICE — CHLORAPREP HI-LITE 26ML ORANGE

## (undated) DEVICE — GLOVE SRG BIOGEL 8

## (undated) DEVICE — SYRINGE 30ML LL

## (undated) DEVICE — NEEDLE HYPO 22G X 1-1/2 IN

## (undated) DEVICE — SUT VICRYL 2-0 CT-2 27 IN J269H

## (undated) DEVICE — GLOVE INDICATOR PI UNDERGLOVE SZ 7 BLUE

## (undated) DEVICE — COUNTERSINK FOR SCREWS 2.7,3.5MM: Brand: VARIAX

## (undated) DEVICE — GAUZE SPONGES,16 PLY: Brand: CURITY

## (undated) DEVICE — ACE WRAP 6 IN UNSTERILE

## (undated) DEVICE — SPECIMEN CONTAINER STERILE PEEL PACK

## (undated) DEVICE — GLOVE INDICATOR PI UNDERGLOVE SZ 8 BLUE

## (undated) DEVICE — DRILL BIT, AO DIA2.6MM X 135MM, SCALED: Brand: VARIAX

## (undated) DEVICE — SUT VICRYL 4-0 PS-2 18 IN J496G

## (undated) DEVICE — ABDOMINAL PAD: Brand: DERMACEA

## (undated) DEVICE — 3M™ DURAPORE™ SURGICAL TAPE 1538-1, 1 INCH X 10 YARD (2,5CM X 9,1M), 12 ROLLS/BOX: Brand: 3M™ DURAPORE™

## (undated) DEVICE — SPLINT 5 X 30 IN FAST SET PLASTER

## (undated) DEVICE — BANDAGE, ESMARK LF STR 6"X9' (20/CS): Brand: CYPRESS

## (undated) DEVICE — DRAPE C-ARMOUR

## (undated) DEVICE — DRESSING XEROFORM 5 X 9

## (undated) DEVICE — GLOVE SRG BIOGEL 6.5

## (undated) DEVICE — DRAPE C-ARM X-RAY

## (undated) DEVICE — INTENDED FOR TISSUE SEPARATION, AND OTHER PROCEDURES THAT REQUIRE A SHARP SURGICAL BLADE TO PUNCTURE OR CUT.: Brand: BARD-PARKER ® CARBON RIB-BACK BLADES

## (undated) DEVICE — SPONGE SCRUB 4 PCT CHLORHEXIDINE

## (undated) DEVICE — INTENDED FOR TISSUE SEPARATION, AND OTHER PROCEDURES THAT REQUIRE A SHARP SURGICAL BLADE TO PUNCTURE OR CUT.: Brand: BARD-PARKER SAFETY BLADES SIZE 15, STERILE

## (undated) DEVICE — HOLDING PIN
Type: IMPLANTABLE DEVICE | Site: ANKLE | Status: NON-FUNCTIONAL
Brand: ANCHORAGE
Removed: 2021-05-17

## (undated) DEVICE — SUT ETHILON 3-0 PS-1 18 IN 1663G

## (undated) DEVICE — CAST PADDING 6IN UNSTERILE

## (undated) DEVICE — CAST PADDING 4 IN UNSTERILE